# Patient Record
Sex: FEMALE | Race: WHITE | NOT HISPANIC OR LATINO | Employment: FULL TIME | URBAN - METROPOLITAN AREA
[De-identification: names, ages, dates, MRNs, and addresses within clinical notes are randomized per-mention and may not be internally consistent; named-entity substitution may affect disease eponyms.]

---

## 2020-10-20 ENCOUNTER — TELEPHONE (OUTPATIENT)
Dept: ENDOCRINOLOGY | Facility: CLINIC | Age: 65
End: 2020-10-20

## 2020-10-20 NOTE — TELEPHONE ENCOUNTER
PATIENT HAS RECENTLY SEEN A GASTRO PROVIDER RECENTLY AND WAS WAS GIVEN A DIET PLAN TO FOLLOW. SHE WOULD LIKE TO SPEAK WITH CLINICAL STAFF ABOUT THIS DIET PLAN AND HOW IT RELATED TO HER DIABETES. CALL BACK [phone]

## 2020-10-20 NOTE — TELEPHONE ENCOUNTER
Radha from [facility] [place] is requesting most recent Hemoglobin A1C Lab results for PT    Fax: [phone]

## 2021-01-29 RX ORDER — FENOFIBRATE 134 MG/1
134 CAPSULE ORAL DAILY
Qty: 90 CAPSULE | Refills: 3 | Status: SHIPPED | OUTPATIENT
Start: 2021-01-29 | End: 2022-02-25 | Stop reason: SDUPTHER

## 2021-01-29 RX ORDER — FENOFIBRATE 134 MG/1
1 CAPSULE ORAL DAILY
COMMUNITY
Start: 2021-01-29 | End: 2021-01-29 | Stop reason: SDUPTHER

## 2021-02-01 RX ORDER — LOSARTAN POTASSIUM 100 MG/1
TABLET ORAL
Qty: 90 TABLET | Refills: 1 | Status: SHIPPED | OUTPATIENT
Start: 2021-02-01 | End: 2021-02-12

## 2021-02-12 ENCOUNTER — OFFICE VISIT (OUTPATIENT)
Dept: ENDOCRINOLOGY | Facility: CLINIC | Age: 66
End: 2021-02-12

## 2021-02-12 ENCOUNTER — LAB (OUTPATIENT)
Dept: LAB | Facility: HOSPITAL | Age: 66
End: 2021-02-12

## 2021-02-12 VITALS
BODY MASS INDEX: 34.96 KG/M2 | HEART RATE: 80 BPM | HEIGHT: 62 IN | SYSTOLIC BLOOD PRESSURE: 130 MMHG | WEIGHT: 190 LBS | OXYGEN SATURATION: 98 % | TEMPERATURE: 97.1 F | DIASTOLIC BLOOD PRESSURE: 80 MMHG

## 2021-02-12 DIAGNOSIS — E83.52 HYPERCALCEMIA: ICD-10-CM

## 2021-02-12 DIAGNOSIS — E05.90 SUBCLINICAL HYPERTHYROIDISM: ICD-10-CM

## 2021-02-12 DIAGNOSIS — IMO0002 DIABETES MELLITUS TYPE 2, UNCONTROLLED, WITH COMPLICATIONS: Primary | ICD-10-CM

## 2021-02-12 LAB
ALBUMIN SERPL-MCNC: 4.8 G/DL (ref 3.5–5.2)
ALBUMIN/GLOB SERPL: 2.1 G/DL
ALP SERPL-CCNC: 50 U/L (ref 39–117)
ALT SERPL W P-5'-P-CCNC: 18 U/L (ref 1–33)
ANION GAP SERPL CALCULATED.3IONS-SCNC: 14.5 MMOL/L (ref 5–15)
AST SERPL-CCNC: 13 U/L (ref 1–32)
BILIRUB SERPL-MCNC: 0.2 MG/DL (ref 0–1.2)
BUN SERPL-MCNC: 24 MG/DL (ref 8–23)
BUN/CREAT SERPL: 29.6 (ref 7–25)
CALCIUM SPEC-SCNC: 11.1 MG/DL (ref 8.6–10.5)
CHLORIDE SERPL-SCNC: 101 MMOL/L (ref 98–107)
CO2 SERPL-SCNC: 23.5 MMOL/L (ref 22–29)
CREAT SERPL-MCNC: 0.81 MG/DL (ref 0.57–1)
EXPIRATION DATE: NORMAL
GFR SERPL CREATININE-BSD FRML MDRD: 71 ML/MIN/1.73
GLOBULIN UR ELPH-MCNC: 2.3 GM/DL
GLUCOSE SERPL-MCNC: 132 MG/DL (ref 65–99)
HBA1C MFR BLD: 7.3 %
Lab: NORMAL
POTASSIUM SERPL-SCNC: 4.3 MMOL/L (ref 3.5–5.2)
PROT SERPL-MCNC: 7.1 G/DL (ref 6–8.5)
PTH-INTACT SERPL-MCNC: 67.7 PG/ML (ref 15–65)
SODIUM SERPL-SCNC: 139 MMOL/L (ref 136–145)
T4 FREE SERPL-MCNC: 1.48 NG/DL (ref 0.93–1.7)
TSH SERPL DL<=0.05 MIU/L-ACNC: 0.53 UIU/ML (ref 0.27–4.2)

## 2021-02-12 PROCEDURE — 99214 OFFICE O/P EST MOD 30 MIN: CPT | Performed by: INTERNAL MEDICINE

## 2021-02-12 PROCEDURE — 83036 HEMOGLOBIN GLYCOSYLATED A1C: CPT | Performed by: INTERNAL MEDICINE

## 2021-02-12 PROCEDURE — 83970 ASSAY OF PARATHORMONE: CPT

## 2021-02-12 PROCEDURE — 84439 ASSAY OF FREE THYROXINE: CPT

## 2021-02-12 PROCEDURE — 84443 ASSAY THYROID STIM HORMONE: CPT

## 2021-02-12 PROCEDURE — 80053 COMPREHEN METABOLIC PANEL: CPT

## 2021-02-12 RX ORDER — ESTRADIOL 0.1 MG/G
1 CREAM VAGINAL
COMMUNITY
Start: 2021-02-01

## 2021-02-12 RX ORDER — DULAGLUTIDE 0.75 MG/.5ML
INJECTION, SOLUTION SUBCUTANEOUS
COMMUNITY
Start: 2021-01-10 | End: 2021-02-12 | Stop reason: SDUPTHER

## 2021-02-12 RX ORDER — AMLODIPINE BESYLATE 5 MG/1
5 TABLET ORAL DAILY
COMMUNITY
Start: 2020-12-29 | End: 2021-09-28 | Stop reason: SDUPTHER

## 2021-02-12 RX ORDER — FUROSEMIDE 20 MG/1
20 TABLET ORAL DAILY
COMMUNITY
Start: 2020-11-24 | End: 2021-08-20

## 2021-02-12 RX ORDER — LANCETS
EACH MISCELLANEOUS
COMMUNITY
End: 2021-07-14

## 2021-02-12 RX ORDER — FLUTICASONE PROPIONATE 50 MCG
2 SPRAY, SUSPENSION (ML) NASAL DAILY PRN
COMMUNITY

## 2021-02-12 RX ORDER — DULAGLUTIDE 0.75 MG/.5ML
0.5 INJECTION, SOLUTION SUBCUTANEOUS WEEKLY
Qty: 2 ML | Refills: 5 | Status: SHIPPED | OUTPATIENT
Start: 2021-02-12 | End: 2021-05-14

## 2021-02-12 RX ORDER — SIMVASTATIN 40 MG
40 TABLET ORAL DAILY
COMMUNITY
Start: 2020-12-29 | End: 2021-03-29 | Stop reason: SDUPTHER

## 2021-02-12 RX ORDER — FEXOFENADINE HCL 180 MG/1
TABLET ORAL
COMMUNITY
End: 2021-05-14

## 2021-02-12 RX ORDER — BISOPROLOL FUMARATE 10 MG/1
10 TABLET, FILM COATED ORAL DAILY
COMMUNITY
Start: 2021-01-29 | End: 2021-12-06 | Stop reason: SDUPTHER

## 2021-02-12 RX ORDER — ASPIRIN 81 MG/1
1 TABLET, CHEWABLE ORAL
COMMUNITY
End: 2021-08-20

## 2021-02-12 RX ORDER — LOSARTAN POTASSIUM 100 MG/1
100 TABLET ORAL DAILY
COMMUNITY
End: 2021-11-30 | Stop reason: SDUPTHER

## 2021-02-12 RX ORDER — CLOBETASOL PROPIONATE 0.5 MG/G
1 OINTMENT TOPICAL 3 TIMES DAILY PRN
COMMUNITY
Start: 2021-02-03

## 2021-02-12 NOTE — PROGRESS NOTES
"     Office Note      Date: 2021  Patient Name: Izabel Abreu  MRN: [id number]  : 1955    Chief Complaint   Patient presents with   • Diabetes       History of Present Illness:   Izabel Abreu is a 65 y.o. female who presents for Diabetes - type 2  Last A1c:7.5  She has had all sorts of digestive issues.  Lots of cramps and gas. Diarrhea and constipation.  Made lots of dietary changes but little change.    bg checks- occasionally  No hypos.    She has hx of high calcium  And subclinical hyperthyroidism   Hemoglobin A1C   Date Value Ref Range Status   2021 7.3 % Final       Changes in health since last visit: see above . Last eye exam 2020.    Subjective     Review of Systems:   Review of Systems   Constitutional: Negative.    HENT: Negative.    Eyes: Negative.    Respiratory: Negative.    Gastrointestinal: Positive for abdominal distention, constipation and diarrhea.   All other systems reviewed and are negative.      The following portions of the patient's history were reviewed and updated as appropriate: allergies, current medications, past family history, past medical history, past social history, past surgical history and problem list.    Objective     Visit Vitals  /80   Pulse 80   Temp 97.1 °F (36.2 °C) (Infrared)   Ht 157.5 cm (62\")   Wt 86.2 kg (190 lb)   SpO2 98%   BMI 34.75 kg/m²       Labs:    CMP  No results found for: GLUCOSE, BUN, CREATININE, EGFRIFNONA, EGFRIFAFRI, BCR, K, CO2, CALCIUM, PROTENTOTREF, LABIL2, BILIRUBIN, AST, ALT     CBC w/DIFF  No results found for: WBC, RBC, HGB, HCT, MCV, MCH, MCHC, RDW, RDWSD, MPV, PLT, NEUTRORELPCT, LYMPHORELPCT, MONORELPCT, EOSRELPCT, BASORELPCT, AUTOIGPER, NEUTROABS, LYMPHSABS, MONOSABS, EOSABS, BASOSABS, AUTOIGNUM, NRBC    Physical Exam:  Physical Exam  Vitals signs reviewed.   Constitutional:       Appearance: Normal appearance.   Cardiovascular:      Pulses:           Dorsalis pedis pulses are 2+ on the right side and 2+ on the " left side.        Posterior tibial pulses are 2+ on the right side and 2+ on the left side.   Musculoskeletal:      Right foot: Normal range of motion. No deformity, bunion, Charcot foot, foot drop or prominent metatarsal heads.      Left foot: Normal range of motion. No deformity, bunion, Charcot foot, foot drop or prominent metatarsal heads.   Feet:      Right foot:      Protective Sensation: 10 sites tested. 10 sites sensed.      Skin integrity: Skin integrity normal.      Toenail Condition: Right toenails are normal.      Left foot:      Protective Sensation: 10 sites tested. 10 sites sensed.      Skin integrity: Skin integrity normal.      Toenail Condition: Left toenails are normal.      Comments: Diabetic Foot Exam Performed and Monofilament Test Performed  Neurological:      Mental Status: She is alert.   Psychiatric:         Mood and Affect: Mood normal.         Thought Content: Thought content normal.         Judgment: Judgment normal.          Assessment / Plan      Assessment & Plan:  Problem List Items Addressed This Visit        Other    Hypercalcemia    Diabetes mellitus type 2, uncontrolled, with complications (CMS/[facility]) - Primary    Relevant Medications    Trulicity 0.75 MG/0.5ML solution pen-injector    metFORMIN (GLUCOPHAGE) 1000 MG tablet    Other Relevant Orders    POC Glycosylated Hemoglobin (Hb A1C) (Completed)    Subclinical hyperthyroidism    Relevant Medications    bisoprolol (ZEBeta) 10 MG tablet           Dez Sánchez MD   02/12/2021

## 2021-03-23 ENCOUNTER — TELEPHONE (OUTPATIENT)
Dept: CARDIOLOGY | Facility: CLINIC | Age: 66
End: 2021-03-23

## 2021-03-29 RX ORDER — SIMVASTATIN 40 MG
40 TABLET ORAL DAILY
Qty: 90 TABLET | Refills: 1 | Status: SHIPPED | OUTPATIENT
Start: 2021-03-29 | End: 2021-09-20

## 2021-05-14 ENCOUNTER — OFFICE VISIT (OUTPATIENT)
Dept: ENDOCRINOLOGY | Facility: CLINIC | Age: 66
End: 2021-05-14

## 2021-05-14 ENCOUNTER — LAB (OUTPATIENT)
Dept: LAB | Facility: HOSPITAL | Age: 66
End: 2021-05-14

## 2021-05-14 VITALS
BODY MASS INDEX: 34.41 KG/M2 | TEMPERATURE: 97.3 F | HEIGHT: 62 IN | HEART RATE: 73 BPM | SYSTOLIC BLOOD PRESSURE: 122 MMHG | WEIGHT: 187 LBS | OXYGEN SATURATION: 96 % | DIASTOLIC BLOOD PRESSURE: 64 MMHG

## 2021-05-14 DIAGNOSIS — E11.65 UNCONTROLLED TYPE 2 DIABETES MELLITUS WITH HYPERGLYCEMIA (HCC): Primary | ICD-10-CM

## 2021-05-14 DIAGNOSIS — E83.52 HYPERCALCEMIA: ICD-10-CM

## 2021-05-14 PROBLEM — E05.90 SUBCLINICAL HYPERTHYROIDISM: Status: RESOLVED | Noted: 2021-02-12 | Resolved: 2021-05-14

## 2021-05-14 PROBLEM — IMO0002 DIABETES MELLITUS TYPE 2, UNCONTROLLED, WITH COMPLICATIONS: Status: RESOLVED | Noted: 2021-02-12 | Resolved: 2021-05-14

## 2021-05-14 LAB
ALBUMIN SERPL-MCNC: 4.4 G/DL (ref 3.5–5.2)
ALBUMIN/GLOB SERPL: 1.6 G/DL
ALP SERPL-CCNC: 55 U/L (ref 39–117)
ALT SERPL W P-5'-P-CCNC: 24 U/L (ref 1–33)
ANION GAP SERPL CALCULATED.3IONS-SCNC: 12 MMOL/L (ref 5–15)
AST SERPL-CCNC: 25 U/L (ref 1–32)
BILIRUB SERPL-MCNC: 0.2 MG/DL (ref 0–1.2)
BUN SERPL-MCNC: 27 MG/DL (ref 8–23)
BUN/CREAT SERPL: 29.7 (ref 7–25)
CALCIUM SPEC-SCNC: 10.8 MG/DL (ref 8.6–10.5)
CHLORIDE SERPL-SCNC: 102 MMOL/L (ref 98–107)
CO2 SERPL-SCNC: 25 MMOL/L (ref 22–29)
CREAT SERPL-MCNC: 0.91 MG/DL (ref 0.57–1)
EXPIRATION DATE: NORMAL
GFR SERPL CREATININE-BSD FRML MDRD: 62 ML/MIN/1.73
GLOBULIN UR ELPH-MCNC: 2.7 GM/DL
GLUCOSE SERPL-MCNC: 134 MG/DL (ref 65–99)
HBA1C MFR BLD: 7.6 %
Lab: NORMAL
POTASSIUM SERPL-SCNC: 4.5 MMOL/L (ref 3.5–5.2)
PROT SERPL-MCNC: 7.1 G/DL (ref 6–8.5)
SODIUM SERPL-SCNC: 139 MMOL/L (ref 136–145)

## 2021-05-14 PROCEDURE — 99213 OFFICE O/P EST LOW 20 MIN: CPT | Performed by: INTERNAL MEDICINE

## 2021-05-14 PROCEDURE — 80053 COMPREHEN METABOLIC PANEL: CPT

## 2021-05-14 PROCEDURE — 83036 HEMOGLOBIN GLYCOSYLATED A1C: CPT | Performed by: INTERNAL MEDICINE

## 2021-05-14 RX ORDER — DULAGLUTIDE 1.5 MG/.5ML
0.5 INJECTION, SOLUTION SUBCUTANEOUS WEEKLY
Qty: 6 ML | Refills: 3 | Status: SHIPPED | OUTPATIENT
Start: 2021-05-14 | End: 2021-08-20

## 2021-05-14 RX ORDER — DEXTROMETHORPHAN HYDROBROMIDE AND PROMETHAZINE HYDROCHLORIDE 15; 6.25 MG/5ML; MG/5ML
SYRUP ORAL AS NEEDED
COMMUNITY
Start: 2021-05-10 | End: 2022-02-25

## 2021-05-14 RX ORDER — ALBUTEROL SULFATE 90 UG/1
AEROSOL, METERED RESPIRATORY (INHALATION) SEE ADMIN INSTRUCTIONS
COMMUNITY
Start: 2021-05-10 | End: 2021-08-20

## 2021-05-14 RX ORDER — LORATADINE 10 MG/1
1 TABLET ORAL DAILY PRN
COMMUNITY
Start: 2020-05-10 | End: 2022-02-25

## 2021-05-14 NOTE — PROGRESS NOTES
"     Office Note      Date: 2021  Patient Name: Izabel Abreu  MRN: [id number]  : 1955    Chief Complaint   Patient presents with   • Follow-up   • Diabetes     type2       History of Present Illness:   Izabel Abreu is a 65 y.o. female who presents for Diabetes - type 2.  She is on trulicity  But we had to reduce the metformin due to GI side effects.  The side effects improved.  She notes higher blood sugars.    Last time her calcium was 11.1 and pth was 67.      Last A1c:  Hemoglobin A1C   Date Value Ref Range Status   2021 7.6 % Final       Changes in health since last visit: none . Last eye exam up to date  .    Subjective          Review of Systems:   Review of Systems   Constitutional: Negative.    HENT: Negative.    All other systems reviewed and are negative.      The following portions of the patient's history were reviewed and updated as appropriate: allergies, current medications, past family history, past medical history, past social history, past surgical history and problem list.    Objective     Visit Vitals  /64   Pulse 73   Temp 97.3 °F (36.3 °C) (Infrared)   Ht 157.5 cm (62\")   Wt 84.8 kg (187 lb)   SpO2 96%   BMI 34.20 kg/m²       Labs:    CMP  Lab Results   Component Value Date    GLUCOSE 132 (H) 2021    BUN 24 (H) 2021    CREATININE 0.81 2021    EGFRIFNONA 71 2021    BCR 29.6 (H) 2021    K 4.3 2021    CO2 23.5 2021    CALCIUM 11.1 (H) 2021    AST 13 2021    ALT 18 2021        CBC w/DIFF  No results found for: WBC, RBC, HGB, HCT, MCV, MCH, MCHC, RDW, RDWSD, MPV, PLT, NEUTRORELPCT, LYMPHORELPCT, MONORELPCT, EOSRELPCT, BASORELPCT, AUTOIGPER, NEUTROABS, LYMPHSABS, MONOSABS, EOSABS, BASOSABS, AUTOIGNUM, NRBC    Physical Exam:  Physical Exam  Vitals reviewed.   Constitutional:       Appearance: Normal appearance.   Neurological:      Mental Status: She is alert.   Psychiatric:         Mood and Affect: Mood normal.   "       Thought Content: Thought content normal.         Judgment: Judgment normal.          Assessment / Plan      Assessment & Plan:  Problem List Items Addressed This Visit        Other    Hypercalcemia    Current Assessment & Plan     Due to primary hyperparathyroidism.  If calcium today is still>11 will send for surgery consult          Relevant Orders    Comprehensive Metabolic Panel    Uncontrolled type 2 diabetes mellitus with hyperglycemia (CMS/HCC) - Primary    Current Assessment & Plan     Deteriorated. Will increased trulicity         Relevant Medications    metFORMIN (GLUCOPHAGE) 1000 MG tablet    Dulaglutide (Trulicity) 1.5 MG/0.5ML solution pen-injector    Other Relevant Orders    POC Glycosylated Hemoglobin (Hb A1C) (Completed)           Dez Sánchez MD   05/14/2021

## 2021-05-19 DIAGNOSIS — E83.52 HYPERCALCEMIA: Primary | ICD-10-CM

## 2021-05-28 ENCOUNTER — TRANSCRIBE ORDERS (OUTPATIENT)
Dept: ADMINISTRATIVE | Facility: HOSPITAL | Age: 66
End: 2021-05-28

## 2021-05-28 DIAGNOSIS — E21.3 HYPERPARATHYROIDISM (HCC): Primary | ICD-10-CM

## 2021-06-23 ENCOUNTER — HOSPITAL ENCOUNTER (OUTPATIENT)
Dept: NUCLEAR MEDICINE | Facility: HOSPITAL | Age: 66
Discharge: HOME | End: 2021-06-23

## 2021-06-23 DIAGNOSIS — E21.3 HYPERPARATHYROIDISM (HCC): ICD-10-CM

## 2021-06-23 PROCEDURE — 0 TECHNETIUM SESTAMIBI: Performed by: SURGERY

## 2021-06-23 PROCEDURE — A9500 TC99M SESTAMIBI: HCPCS | Performed by: SURGERY

## 2021-06-23 PROCEDURE — 78070 PARATHYROID PLANAR IMAGING: CPT

## 2021-06-23 RX ADMIN — TECHNETIUM TC 99M SESTAMIBI 1 DOSE: 1 INJECTION INTRAVENOUS at 11:01

## 2021-07-14 ENCOUNTER — PRE-ADMISSION TESTING (OUTPATIENT)
Dept: PREADMISSION TESTING | Facility: HOSPITAL | Age: 66
End: 2021-07-14

## 2021-07-14 VITALS — BODY MASS INDEX: 34 KG/M2 | HEIGHT: 62 IN | WEIGHT: 184.75 LBS

## 2021-07-14 LAB
ANION GAP SERPL CALCULATED.3IONS-SCNC: 15 MMOL/L (ref 5–15)
BUN SERPL-MCNC: 31 MG/DL (ref 8–23)
BUN/CREAT SERPL: 31 (ref 7–25)
CALCIUM SPEC-SCNC: 11.2 MG/DL (ref 8.6–10.5)
CHLORIDE SERPL-SCNC: 101 MMOL/L (ref 98–107)
CO2 SERPL-SCNC: 23 MMOL/L (ref 22–29)
CREAT SERPL-MCNC: 1 MG/DL (ref 0.57–1)
DEPRECATED RDW RBC AUTO: 46.7 FL (ref 37–54)
ERYTHROCYTE [DISTWIDTH] IN BLOOD BY AUTOMATED COUNT: 15.1 % (ref 12.3–15.4)
GFR SERPL CREATININE-BSD FRML MDRD: 56 ML/MIN/1.73
GLUCOSE SERPL-MCNC: 149 MG/DL (ref 65–99)
HCT VFR BLD AUTO: 46.2 % (ref 34–46.6)
HGB BLD-MCNC: 14.7 G/DL (ref 12–15.9)
MCH RBC QN AUTO: 27.3 PG (ref 26.6–33)
MCHC RBC AUTO-ENTMCNC: 31.8 G/DL (ref 31.5–35.7)
MCV RBC AUTO: 85.7 FL (ref 79–97)
PLATELET # BLD AUTO: 432 10*3/MM3 (ref 140–450)
PMV BLD AUTO: 11.3 FL (ref 6–12)
POTASSIUM SERPL-SCNC: 4.9 MMOL/L (ref 3.5–5.2)
QT INTERVAL: 394 MS
QTC INTERVAL: 431 MS
RBC # BLD AUTO: 5.39 10*6/MM3 (ref 3.77–5.28)
SARS-COV-2 RNA PNL SPEC NAA+PROBE: NOT DETECTED
SODIUM SERPL-SCNC: 139 MMOL/L (ref 136–145)
WBC # BLD AUTO: 10.22 10*3/MM3 (ref 3.4–10.8)

## 2021-07-14 PROCEDURE — C9803 HOPD COVID-19 SPEC COLLECT: HCPCS

## 2021-07-14 PROCEDURE — 85027 COMPLETE CBC AUTOMATED: CPT

## 2021-07-14 PROCEDURE — 93005 ELECTROCARDIOGRAM TRACING: CPT

## 2021-07-14 PROCEDURE — 80048 BASIC METABOLIC PNL TOTAL CA: CPT

## 2021-07-14 PROCEDURE — 36415 COLL VENOUS BLD VENIPUNCTURE: CPT

## 2021-07-14 PROCEDURE — U0004 COV-19 TEST NON-CDC HGH THRU: HCPCS

## 2021-07-14 PROCEDURE — 93010 ELECTROCARDIOGRAM REPORT: CPT | Performed by: INTERNAL MEDICINE

## 2021-07-14 RX ORDER — SIMETHICONE 125 MG
125 TABLET,CHEWABLE ORAL EVERY 6 HOURS PRN
COMMUNITY
End: 2022-10-21

## 2021-07-14 RX ORDER — SENNOSIDES 8.6 MG
1300 CAPSULE ORAL EVERY 8 HOURS PRN
COMMUNITY

## 2021-07-14 RX ORDER — ALPHA-D-GALACTOSIDASE
2 TABLET ORAL 3 TIMES DAILY PRN
COMMUNITY

## 2021-07-14 RX ORDER — DIPHENHYDRAMINE HCL 25 MG
25 CAPSULE ORAL NIGHTLY PRN
COMMUNITY
End: 2022-02-25

## 2021-07-14 RX ORDER — ELECTROLYTES/DEXTROSE
1 SOLUTION, ORAL ORAL DAILY
COMMUNITY
End: 2022-02-25

## 2021-07-14 RX ORDER — LANOLIN ALCOHOL/MO/W.PET/CERES
1000 CREAM (GRAM) TOPICAL DAILY
COMMUNITY

## 2021-07-14 RX ORDER — OLOPATADINE HYDROCHLORIDE 2 MG/ML
1 SOLUTION/ DROPS OPHTHALMIC DAILY
COMMUNITY

## 2021-07-14 NOTE — PAT
An arrival time for procedure was not given during PAT visit. If patient had any questions or concerns about their arrival time, they were instructed to contact their surgeon/physician.  Additionally, if the patient referred to an arrival time that was acquired from their my chart account, patient was encouraged to verify that time with their surgeon/physician.  NO arrival times given in Pre Admission Testing Department.    Patient to apply Chlorhexadine wipes  to surgical area (as instructed) the night before procedure and the AM of procedure. Wipes provided.    Per Anesthesia Request, patient instructed not to take their ACE/ARB medications on the AM of surgery.

## 2021-07-15 ENCOUNTER — ANESTHESIA EVENT (OUTPATIENT)
Dept: PERIOP | Facility: HOSPITAL | Age: 66
End: 2021-07-15

## 2021-07-15 RX ORDER — FAMOTIDINE 10 MG/ML
20 INJECTION, SOLUTION INTRAVENOUS ONCE
Status: CANCELLED | OUTPATIENT
Start: 2021-07-15 | End: 2021-07-15

## 2021-07-16 ENCOUNTER — ANESTHESIA (OUTPATIENT)
Dept: PERIOP | Facility: HOSPITAL | Age: 66
End: 2021-07-16

## 2021-07-16 ENCOUNTER — HOSPITAL ENCOUNTER (OUTPATIENT)
Facility: HOSPITAL | Age: 66
Setting detail: HOSPITAL OUTPATIENT SURGERY
Discharge: HOME | End: 2021-07-16
Attending: SURGERY | Admitting: SURGERY

## 2021-07-16 VITALS
HEART RATE: 73 BPM | DIASTOLIC BLOOD PRESSURE: 71 MMHG | WEIGHT: 184.75 LBS | SYSTOLIC BLOOD PRESSURE: 145 MMHG | OXYGEN SATURATION: 96 % | HEIGHT: 62 IN | TEMPERATURE: 97.6 F | RESPIRATION RATE: 18 BRPM | BODY MASS INDEX: 34 KG/M2

## 2021-07-16 DIAGNOSIS — E20.9 IDIOPATHIC PARATHYROIDISM (HCC): ICD-10-CM

## 2021-07-16 LAB
GLUCOSE BLDC GLUCOMTR-MCNC: 166 MG/DL (ref 70–130)
GLUCOSE BLDC GLUCOMTR-MCNC: 195 MG/DL (ref 70–130)
PTH-INTACT SERPL-MCNC: 77.8 PG/ML (ref 15–65)

## 2021-07-16 PROCEDURE — 25010000002 DEXAMETHASONE PER 1 MG: Performed by: NURSE ANESTHETIST, CERTIFIED REGISTERED

## 2021-07-16 PROCEDURE — 88331 PATH CONSLTJ SURG 1 BLK 1SPC: CPT | Performed by: PATHOLOGY

## 2021-07-16 PROCEDURE — 25010000002 ONDANSETRON PER 1 MG: Performed by: NURSE ANESTHETIST, CERTIFIED REGISTERED

## 2021-07-16 PROCEDURE — 63710000001 PROMETHAZINE PER 25 MG: Performed by: NURSE ANESTHETIST, CERTIFIED REGISTERED

## 2021-07-16 PROCEDURE — 83970 ASSAY OF PARATHORMONE: CPT | Performed by: SURGERY

## 2021-07-16 PROCEDURE — C1889 IMPLANT/INSERT DEVICE, NOC: HCPCS | Performed by: SURGERY

## 2021-07-16 PROCEDURE — 63710000001 INSULIN LISPRO (HUMAN) PER 5 UNITS: Performed by: NURSE ANESTHETIST, CERTIFIED REGISTERED

## 2021-07-16 PROCEDURE — 88305 TISSUE EXAM BY PATHOLOGIST: CPT | Performed by: SURGERY

## 2021-07-16 PROCEDURE — 25010000002 PROPOFOL 10 MG/ML EMULSION: Performed by: NURSE ANESTHETIST, CERTIFIED REGISTERED

## 2021-07-16 PROCEDURE — 25010000003 CEFAZOLIN IN DEXTROSE 2-4 GM/100ML-% SOLUTION: Performed by: SURGERY

## 2021-07-16 PROCEDURE — 94799 UNLISTED PULMONARY SVC/PX: CPT

## 2021-07-16 PROCEDURE — 82962 GLUCOSE BLOOD TEST: CPT

## 2021-07-16 PROCEDURE — 60500 EXPLORE PARATHYROID GLANDS: CPT | Performed by: PHYSICIAN ASSISTANT

## 2021-07-16 PROCEDURE — 25010000002 FENTANYL CITRATE (PF) 50 MCG/ML SOLUTION: Performed by: NURSE ANESTHETIST, CERTIFIED REGISTERED

## 2021-07-16 DEVICE — CLIP LIGAT VASC HORIZON TI SM YEL 6CT: Type: IMPLANTABLE DEVICE | Site: PARATHYROID | Status: FUNCTIONAL

## 2021-07-16 RX ORDER — PROMETHAZINE HYDROCHLORIDE 25 MG/1
25 TABLET ORAL ONCE AS NEEDED
Status: COMPLETED | OUTPATIENT
Start: 2021-07-16 | End: 2021-07-16

## 2021-07-16 RX ORDER — EPHEDRINE SULFATE 50 MG/ML
INJECTION, SOLUTION INTRAVENOUS AS NEEDED
Status: DISCONTINUED | OUTPATIENT
Start: 2021-07-16 | End: 2021-07-16 | Stop reason: SURG

## 2021-07-16 RX ORDER — OXYCODONE HYDROCHLORIDE AND ACETAMINOPHEN 5; 325 MG/1; MG/1
1 TABLET ORAL ONCE AS NEEDED
Status: DISCONTINUED | OUTPATIENT
Start: 2021-07-16 | End: 2021-07-16 | Stop reason: HOSPADM

## 2021-07-16 RX ORDER — MIDAZOLAM HYDROCHLORIDE 1 MG/ML
1 INJECTION INTRAMUSCULAR; INTRAVENOUS
Status: DISCONTINUED | OUTPATIENT
Start: 2021-07-16 | End: 2021-07-16 | Stop reason: HOSPADM

## 2021-07-16 RX ORDER — FENTANYL CITRATE 50 UG/ML
50 INJECTION, SOLUTION INTRAMUSCULAR; INTRAVENOUS
Status: DISCONTINUED | OUTPATIENT
Start: 2021-07-16 | End: 2021-07-16 | Stop reason: HOSPADM

## 2021-07-16 RX ORDER — SODIUM CHLORIDE, SODIUM LACTATE, POTASSIUM CHLORIDE, CALCIUM CHLORIDE 600; 310; 30; 20 MG/100ML; MG/100ML; MG/100ML; MG/100ML
9 INJECTION, SOLUTION INTRAVENOUS CONTINUOUS
Status: DISCONTINUED | OUTPATIENT
Start: 2021-07-16 | End: 2021-07-16 | Stop reason: HOSPADM

## 2021-07-16 RX ORDER — IPRATROPIUM BROMIDE AND ALBUTEROL SULFATE 2.5; .5 MG/3ML; MG/3ML
3 SOLUTION RESPIRATORY (INHALATION) ONCE AS NEEDED
Status: DISCONTINUED | OUTPATIENT
Start: 2021-07-16 | End: 2021-07-16 | Stop reason: HOSPADM

## 2021-07-16 RX ORDER — PROMETHAZINE HYDROCHLORIDE 25 MG/1
25 SUPPOSITORY RECTAL ONCE AS NEEDED
Status: COMPLETED | OUTPATIENT
Start: 2021-07-16 | End: 2021-07-16

## 2021-07-16 RX ORDER — HYDROMORPHONE HYDROCHLORIDE 1 MG/ML
0.5 INJECTION, SOLUTION INTRAMUSCULAR; INTRAVENOUS; SUBCUTANEOUS
Status: DISCONTINUED | OUTPATIENT
Start: 2021-07-16 | End: 2021-07-16 | Stop reason: HOSPADM

## 2021-07-16 RX ORDER — PROMETHAZINE HYDROCHLORIDE 25 MG/1
25 TABLET ORAL ONCE AS NEEDED
Status: DISCONTINUED | OUTPATIENT
Start: 2021-07-16 | End: 2021-07-16 | Stop reason: SDUPTHER

## 2021-07-16 RX ORDER — TRAMADOL HYDROCHLORIDE 50 MG/1
50 TABLET ORAL EVERY 6 HOURS PRN
Qty: 20 TABLET | Refills: 0 | Status: SHIPPED | OUTPATIENT
Start: 2021-07-16 | End: 2021-08-20

## 2021-07-16 RX ORDER — DEXAMETHASONE SODIUM PHOSPHATE 4 MG/ML
INJECTION, SOLUTION INTRA-ARTICULAR; INTRALESIONAL; INTRAMUSCULAR; INTRAVENOUS; SOFT TISSUE AS NEEDED
Status: DISCONTINUED | OUTPATIENT
Start: 2021-07-16 | End: 2021-07-16 | Stop reason: SURG

## 2021-07-16 RX ORDER — CEFAZOLIN SODIUM 2 G/100ML
2 INJECTION, SOLUTION INTRAVENOUS ONCE
Status: COMPLETED | OUTPATIENT
Start: 2021-07-16 | End: 2021-07-16

## 2021-07-16 RX ORDER — HYDRALAZINE HYDROCHLORIDE 20 MG/ML
5 INJECTION INTRAMUSCULAR; INTRAVENOUS
Status: DISCONTINUED | OUTPATIENT
Start: 2021-07-16 | End: 2021-07-16 | Stop reason: HOSPADM

## 2021-07-16 RX ORDER — DROPERIDOL 2.5 MG/ML
0.62 INJECTION, SOLUTION INTRAMUSCULAR; INTRAVENOUS AS NEEDED
Status: DISCONTINUED | OUTPATIENT
Start: 2021-07-16 | End: 2021-07-16 | Stop reason: HOSPADM

## 2021-07-16 RX ORDER — MAGNESIUM HYDROXIDE 1200 MG/15ML
LIQUID ORAL AS NEEDED
Status: DISCONTINUED | OUTPATIENT
Start: 2021-07-16 | End: 2021-07-16 | Stop reason: HOSPADM

## 2021-07-16 RX ORDER — REMIFENTANIL HYDROCHLORIDE 1 MG/ML
INJECTION, POWDER, LYOPHILIZED, FOR SOLUTION INTRAVENOUS CONTINUOUS PRN
Status: DISCONTINUED | OUTPATIENT
Start: 2021-07-16 | End: 2021-07-16 | Stop reason: SURG

## 2021-07-16 RX ORDER — SODIUM CHLORIDE 0.9 % (FLUSH) 0.9 %
3 SYRINGE (ML) INJECTION EVERY 12 HOURS SCHEDULED
Status: DISCONTINUED | OUTPATIENT
Start: 2021-07-16 | End: 2021-07-16 | Stop reason: HOSPADM

## 2021-07-16 RX ORDER — LIDOCAINE HYDROCHLORIDE 10 MG/ML
0.5 INJECTION, SOLUTION EPIDURAL; INFILTRATION; INTRACAUDAL; PERINEURAL ONCE AS NEEDED
Status: DISCONTINUED | OUTPATIENT
Start: 2021-07-16 | End: 2021-07-16 | Stop reason: HOSPADM

## 2021-07-16 RX ORDER — BUPIVACAINE HCL/0.9 % NACL/PF 0.125 %
PLASTIC BAG, INJECTION (ML) EPIDURAL AS NEEDED
Status: DISCONTINUED | OUTPATIENT
Start: 2021-07-16 | End: 2021-07-16 | Stop reason: SURG

## 2021-07-16 RX ORDER — ONDANSETRON 2 MG/ML
INJECTION INTRAMUSCULAR; INTRAVENOUS AS NEEDED
Status: DISCONTINUED | OUTPATIENT
Start: 2021-07-16 | End: 2021-07-16 | Stop reason: SURG

## 2021-07-16 RX ORDER — ONDANSETRON 2 MG/ML
4 INJECTION INTRAMUSCULAR; INTRAVENOUS ONCE AS NEEDED
Status: COMPLETED | OUTPATIENT
Start: 2021-07-16 | End: 2021-07-16

## 2021-07-16 RX ORDER — DROPERIDOL 2.5 MG/ML
0.62 INJECTION, SOLUTION INTRAMUSCULAR; INTRAVENOUS ONCE AS NEEDED
Status: DISCONTINUED | OUTPATIENT
Start: 2021-07-16 | End: 2021-07-16 | Stop reason: HOSPADM

## 2021-07-16 RX ORDER — CALCIUM CARBONATE 200(500)MG
2 TABLET,CHEWABLE ORAL 2 TIMES DAILY
Qty: 90 TABLET | Refills: 3 | Status: SHIPPED | OUTPATIENT
Start: 2021-07-16 | End: 2021-08-20

## 2021-07-16 RX ORDER — FAMOTIDINE 20 MG/1
20 TABLET, FILM COATED ORAL ONCE
Status: DISCONTINUED | OUTPATIENT
Start: 2021-07-16 | End: 2021-07-16 | Stop reason: HOSPADM

## 2021-07-16 RX ORDER — MEPERIDINE HYDROCHLORIDE 25 MG/ML
12.5 INJECTION INTRAMUSCULAR; INTRAVENOUS; SUBCUTANEOUS
Status: DISCONTINUED | OUTPATIENT
Start: 2021-07-16 | End: 2021-07-16 | Stop reason: HOSPADM

## 2021-07-16 RX ORDER — PROPOFOL 10 MG/ML
VIAL (ML) INTRAVENOUS AS NEEDED
Status: DISCONTINUED | OUTPATIENT
Start: 2021-07-16 | End: 2021-07-16 | Stop reason: SURG

## 2021-07-16 RX ORDER — NALOXONE HCL 0.4 MG/ML
0.4 VIAL (ML) INJECTION AS NEEDED
Status: DISCONTINUED | OUTPATIENT
Start: 2021-07-16 | End: 2021-07-16 | Stop reason: HOSPADM

## 2021-07-16 RX ORDER — SODIUM CHLORIDE 0.9 % (FLUSH) 0.9 %
10 SYRINGE (ML) INJECTION AS NEEDED
Status: DISCONTINUED | OUTPATIENT
Start: 2021-07-16 | End: 2021-07-16 | Stop reason: HOSPADM

## 2021-07-16 RX ORDER — LABETALOL HYDROCHLORIDE 5 MG/ML
5 INJECTION, SOLUTION INTRAVENOUS
Status: DISCONTINUED | OUTPATIENT
Start: 2021-07-16 | End: 2021-07-16 | Stop reason: HOSPADM

## 2021-07-16 RX ORDER — SODIUM CHLORIDE 0.9 % (FLUSH) 0.9 %
3-10 SYRINGE (ML) INJECTION AS NEEDED
Status: DISCONTINUED | OUTPATIENT
Start: 2021-07-16 | End: 2021-07-16 | Stop reason: HOSPADM

## 2021-07-16 RX ORDER — SODIUM CHLORIDE 0.9 % (FLUSH) 0.9 %
10 SYRINGE (ML) INJECTION EVERY 12 HOURS SCHEDULED
Status: DISCONTINUED | OUTPATIENT
Start: 2021-07-16 | End: 2021-07-16 | Stop reason: HOSPADM

## 2021-07-16 RX ADMIN — ONDANSETRON 4 MG: 2 INJECTION INTRAMUSCULAR; INTRAVENOUS at 10:07

## 2021-07-16 RX ADMIN — DEXAMETHASONE SODIUM PHOSPHATE 8 MG: 4 INJECTION, SOLUTION INTRA-ARTICULAR; INTRALESIONAL; INTRAMUSCULAR; INTRAVENOUS; SOFT TISSUE at 10:07

## 2021-07-16 RX ADMIN — Medication 100 MCG: at 09:56

## 2021-07-16 RX ADMIN — REMIFENTANIL HYDROCHLORIDE 0.2 MCG/KG/MIN: 1 INJECTION, POWDER, LYOPHILIZED, FOR SOLUTION INTRAVENOUS at 09:29

## 2021-07-16 RX ADMIN — ONDANSETRON 4 MG: 2 INJECTION INTRAMUSCULAR; INTRAVENOUS at 11:43

## 2021-07-16 RX ADMIN — Medication 100 MCG: at 09:55

## 2021-07-16 RX ADMIN — FENTANYL CITRATE 50 MCG: 50 INJECTION INTRAMUSCULAR; INTRAVENOUS at 11:08

## 2021-07-16 RX ADMIN — CEFAZOLIN SODIUM 2 G: 10 INJECTION, POWDER, FOR SOLUTION INTRAVENOUS at 09:23

## 2021-07-16 RX ADMIN — PROMETHAZINE HYDROCHLORIDE 25 MG: 25 TABLET ORAL at 11:11

## 2021-07-16 RX ADMIN — PROPOFOL 150 MG: 10 INJECTION, EMULSION INTRAVENOUS at 09:29

## 2021-07-16 RX ADMIN — EPHEDRINE SULFATE 5 MG: 50 INJECTION INTRAVENOUS at 09:56

## 2021-07-16 RX ADMIN — INSULIN LISPRO 2 UNITS: 100 INJECTION, SOLUTION INTRAVENOUS; SUBCUTANEOUS at 12:05

## 2021-07-16 RX ADMIN — SODIUM CHLORIDE, POTASSIUM CHLORIDE, SODIUM LACTATE AND CALCIUM CHLORIDE: 600; 310; 30; 20 INJECTION, SOLUTION INTRAVENOUS at 09:23

## 2021-07-16 RX ADMIN — FENTANYL CITRATE 50 MCG: 50 INJECTION INTRAMUSCULAR; INTRAVENOUS at 11:20

## 2021-07-16 NOTE — ANESTHESIA PROCEDURE NOTES
Airway  Urgency: elective    Date/Time: 7/16/2021 9:31 AM  Airway not difficult    General Information and Staff    Patient location during procedure: OR  CRNA: Justo Castillo CRNA    Indications and Patient Condition  Indications for airway management: airway protection    Preoxygenated: yes  MILS not maintained throughout  Mask difficulty assessment: 1 - vent by mask    Final Airway Details  Final airway type: endotracheal airway      Successful airway: ETT  Cuffed: yes   Successful intubation technique: direct laryngoscopy  Endotracheal tube insertion site: oral  Blade: Briggs  Blade size: 3  ETT size (mm): 7.0  Cormack-Lehane Classification: grade I - full view of glottis  Placement verified by: chest auscultation and capnometry   Measured from: lips  ETT/EBT  to lips (cm): 20  Number of attempts at approach: 1  Assessment: lips, teeth, and gum same as pre-op and atraumatic intubation    Additional Comments  Negative epigastric sounds, Breath sound equal bilaterally with symmetric chest rise and fall

## 2021-07-16 NOTE — H&P
Pre-Op H&P  Izabel Abreu  [id number]  1955      Chief complaint: Fatigue      Subjective:  Patient is a 65 y.o.female presents for scheduled surgery by Dr. Suh. She anticipates a PARATHYROIDECTOMY today. She has hypercalcemia and elevated PTH for several years. Some fatigue and brain fog. Reports constipation.      Review of Systems:  Constitutional-- No fever, chills or sweats. + fatigue.  CV-- No chest pain, palpitation or syncope. +HTN, HLD  Resp-- No SOB, cough, hemoptysis. +CARO on CPAP  Skin--No rashes or lesions      Allergies:   Allergies   Allergen Reactions   • Diflucan [Fluconazole] Headache   • Azithromycin GI Intolerance   • Codeine Itching   • Hydrocodone-Acetaminophen Hives   • Latex Itching   • Penicillins Hives   • Quinapril Other (See Comments)     cough         Home Meds:  Medications Prior to Admission   Medication Sig Dispense Refill Last Dose   • acetaminophen (TYLENOL) 650 MG 8 hr tablet Take 1,300 mg by mouth Every 8 (Eight) Hours As Needed for Mild Pain .   Past Week at Unknown time   • Alpha-D-Galactosidase (Beano) tablet Take 2 doses by mouth 3 (Three) Times a Day As Needed (FLATULENCE).   Past Week at Unknown time   • amLODIPine (NORVASC) 5 MG tablet Take 5 mg by mouth Daily.   7/16/2021 at 0648   • bisoprolol (ZEBeta) 10 MG tablet Take 10 mg by mouth Daily.   7/16/2021 at 0648   • clobetasol (TEMOVATE) 0.05 % ointment Apply 1 application topically to the appropriate area as directed 3 (Three) Times a Day As Needed.   Past Week at Unknown time   • diphenhydrAMINE (BENADRYL) 25 mg capsule Take 25 mg by mouth At Night As Needed for Sleep.   7/15/2021 at 2030   • estradiol (ESTRACE) 0.1 MG/GM vaginal cream Insert 1 g into the vagina 3 (Three) Times a Week if Needed (VAGINAL ATROPHY).   Past Month at Unknown time   • fenofibrate micronized (LOFIBRA) 134 MG capsule Take 1 capsule by mouth Daily for 90 days. 90 capsule 3 7/15/2021 at 0730   • fluticasone (FLONASE) 50 MCG/ACT  nasal spray 2 sprays into the nostril(s) as directed by provider Daily As Needed for Rhinitis or Allergies.   Past Week at Unknown time   • furosemide (LASIX) 20 MG tablet Take 20 mg by mouth Daily.   7/15/2021 at 0730   • loratadine (Claritin) 10 MG tablet Take 1 tablet by mouth Daily As Needed.   Past Week at Unknown time   • losartan (COZAAR) 100 MG tablet Take 100 mg by mouth Daily.   7/15/2021 at 0730   • metFORMIN (GLUCOPHAGE) 1000 MG tablet TAKE 1 TABLET BY MOUTH TWICE DAILY. (Patient taking differently: Take 1,000 mg by mouth Daily.) 180 tablet 1 7/15/2021 at 0730   • olopatadine (PATADAY) 0.2 % solution ophthalmic solution Administer 1 drop to both eyes Daily.   Past Week at Unknown time   • polyethyl glycol-propyl glycol (SYSTANE) 0.4-0.3 % solution ophthalmic solution Administer 1 drop to both eyes Every 1 (One) Hour As Needed (DRY EYES).   Past Week at Unknown time   • Pyridoxine HCl (Vitamin B6) 100 MG tablet Take 1 tablet by mouth Daily.   7/15/2021 at 0730   • simethicone (MYLICON) 125 MG chewable tablet Chew 125 mg Every 6 (Six) Hours As Needed for Flatulence.   Past Week at Unknown time   • simvastatin (ZOCOR) 40 MG tablet Take 1 tablet by mouth Daily. 90 tablet 1 7/15/2021 at 2230   • vitamin B-12 (CYANOCOBALAMIN) 1000 MCG tablet Take 1,000 mcg by mouth Daily.   7/15/2021 at 0730   • albuterol sulfate  (90 Base) MCG/ACT inhaler Inhale See Admin Instructions. Inhale 2 puffs by mouth every 4 to 6 hours as needed      • aspirin 81 MG chewable tablet Chew 1 tablet.      • Dulaglutide (Trulicity) 1.5 MG/0.5ML solution pen-injector Inject 1.5 mg under the skin into the appropriate area as directed 1 (One) Time Per Week. 6 mL 3 7/11/2021   • metFORMIN (GLUCOPHAGE) 1000 MG tablet Take 1,000 mg by mouth Daily With Breakfast.      • promethazine-dextromethorphan (PROMETHAZINE-DM) 6.25-15 MG/5ML syrup As Needed.   More than a month at Unknown time         PMH:   Past Medical History:   Diagnosis Date  "  • Asthma    • Diabetes mellitus (CMS/HCC)    • Elevated cholesterol    • Flatulence    • Heart murmur    • Hypercalcemia    • Hyperglycemia    • Hyperparathyroidism (CMS/HCC)    • Hypertension    • Sleep apnea     CPAP 7/14CM   • Subclinical hyperthyroidism      PSH:    Past Surgical History:   Procedure Laterality Date   •  SECTION     • COLONOSCOPY      SCHEDULED FOR    • D & C AND LAPAROSCOPY     • SHOULDER SURGERY      shoulder joint surgery-Abstracted from [place]    • SINUS SURGERY     • TOE SURGERY     • TUBAL ABDOMINAL LIGATION Bilateral        Immunization History:  Influenza:   Pneumococcal: UTD  Tetanus: UTD  Covid x2:     Social History:   Tobacco:   Social History     Tobacco Use   Smoking Status Never Smoker   Smokeless Tobacco Never Used      Alcohol:     Social History     Substance and Sexual Activity   Alcohol Use Yes    Comment: rarely         Physical Exam:/83 (BP Location: Right arm, Patient Position: Lying)   Pulse 68   Temp 98.3 °F (36.8 °C) (Temporal)   Resp 16   Ht 157.5 cm (62\")   Wt 83.8 kg (184 lb 11.9 oz)   SpO2 96%   BMI 33.79 kg/m²       General Appearance:    Alert, cooperative, no distress, appears stated age   Head:    Normocephalic, without obvious abnormality, atraumatic   Lungs:     Clear to auscultation bilaterally, respirations unlabored    Heart:   Regular rate and rhythm, S1 and S2 normal    Abdomen:    Soft without tenderness   Extremities:   Extremities normal, atraumatic, no cyanosis or edema   Skin:   Skin color, texture, turgor normal, no rashes or lesions   Neurologic:   Grossly intact     Results Review:     LABS:  Lab Results   Component Value Date    WBC 10.22 2021    HGB 14.7 2021    HCT 46.2 2021    MCV 85.7 2021     2021    GLUCOSE 149 (H) 2021    BUN 31 (H) 2021    CREATININE 1.00 2021    EGFRIFNONA 56 (L) 2021     2021    K 4.9 2021     " 07/14/2021    CO2 23.0 07/14/2021    CALCIUM 11.2 (H) 07/14/2021    ALBUMIN 4.40 05/14/2021    AST 25 05/14/2021    ALT 24 05/14/2021    BILITOT 0.2 05/14/2021     Results for VAISHALI HERRERA (MRN [id number]) as of 7/16/2021 09:11   Ref. Range 2/12/2021 10:33   PTH Intact (Serial Monitor) Latest Ref Range: 15.0 - 65.0 pg/mL 67.7 (H)   TSH Baseline Latest Ref Range: 0.270 - 4.200 uIU/mL 0.530   Free T4 Latest Ref Range: 0.93 - 1.70 ng/dL 1.48     RADIOLOGY:  6/23/21 NM parathyroid scan:  IMPRESSION:  Persistent uptake of tracer inferiorly at the right lobe of  the thyroid concerning for hyperparathyroidism.    I reviewed the patient's new clinical results.    Cancer Staging (if applicable)  Cancer Patient: __ yes __no __unknown; If yes, clinical stage T:__ N:__M:__, stage group or __N/A      Impression: Primary hyperparathyroidism      Plan: PARATHYROIDECTOMY      JONO Calderon   7/16/2021   09:09 EDT

## 2021-07-16 NOTE — BRIEF OP NOTE
PARATHYROIDECTOMY  Progress Note    Izabel Abreu  7/16/2021    Pre-op Diagnosis:   Hyperparathyroidism, primary       Post-Op Diagnosis Codes:     * Hyperparathyroidism, primary (CMS/[facility]) [E21.0]    Procedure/CPT® Codes:        Procedure(s):  PARATHYROIDECTOMY    Surgeon(s):  Apolinar Suh MD    Anesthesia: General    Staff:   Circulator: Haase, Sherri L, RN; Olamide De Los Santos RN  Scrub Person: Ryan Velásquez  Assistant: Ruby Donis PA-C  Assistant: Ruby Donis PA-C      Estimated Blood Loss: minimal    Urine Voided: * No values recorded between 7/16/2021  9:23 AM and 7/16/2021 10:29 AM *    Specimens:                Specimens     ID Source Type Tests Collected By Collected At Frozen?    A Parathyroid Gland Tissue · TISSUE PATHOLOGY EXAM   Apolinar Suh MD 7/16/21 1018 Yes    Description: possible right superior  parathyroid                Drains: * No LDAs found *    Findings: Enlarged right superior parathyroid gland, confirmed by frozen section    Complications: none    Assistant: Ruby Donis PA-C  was responsible for performing the following activities: Retraction, Suturing, Closing and Placing Dressing and their skilled assistance was necessary for the success of this case.    Apolinar Suh MD     Date: 7/16/2021  Time: 10:29 EDT

## 2021-07-16 NOTE — ANESTHESIA PREPROCEDURE EVALUATION
Anesthesia Evaluation     Patient summary reviewed and Nursing notes reviewed                Airway   Mallampati: II  Dental      Pulmonary - negative pulmonary ROS   Cardiovascular     (+) hypertension,       Neuro/Psych- negative ROS  GI/Hepatic/Renal/Endo    (+)   diabetes mellitus,     Musculoskeletal (-) negative ROS    Abdominal    Substance History - negative use     OB/GYN negative ob/gyn ROS         Other                        Anesthesia Plan    ASA 3     general     intravenous induction     Anesthetic plan, all risks, benefits, and alternatives have been provided, discussed and informed consent has been obtained with: patient.

## 2021-07-16 NOTE — OP NOTE
General Surgery Operative Note    PARATHYROIDECTOMY  Procedure Report    Patient Name:  Izabel Abreu  YOB: 1955  [id number]     Date of Surgery:  7/16/2021       Pre-op Diagnosis: Hyperparathyroidism    Post-op Diagnosis: Hyperparathyroidism      Procedure(s):  PARATHYROIDECTOMY    Surgeon: Apolinar Suh MD    Assistant: Assistant: Ruby Donis PA-C     Anesthesia: General    Estimated Blood Loss: minimal    Implants:    Implant Name Type Inv. Item Serial No.  Lot No. LRB No. Used Action   CLIP LIGAT VASC HORIZON TI SM YEL 6CT - [id number] Implant CLIP LIGAT VASC HORIZON TI SM YEL 6CT  [employer]  N/A 1 Implanted       Specimen:          Specimens     ID Source Type Tests Collected By Collected At Frozen?    A Parathyroid Gland Tissue · TISSUE PATHOLOGY EXAM   Apolinar Suh MD 7/16/21 1018 Yes    Description: possible right superior  parathyroid              Findings: Enlarged right superior parathyroid gland, confirmed by frozen section    Indications: Patient is a 65-year-old female who was noted on blood work to have high calcium level.  Parathyroid hormone level was checked and was also elevated consistent with primary hyperparathyroidism.  Discussions were made with patient on various treatment options and the patient was found to be agreeable to parathyroidectomy.  Sestamibi scan was performed preoperatively which localized a likely right-sided adenoma.  Informed consent was obtained.    Description of Procedure:     After obtaining informed consent, the patient was taken to the operating room and placed in supine position. After appropriate DVT and antibiotic prophylaxis, general anesthesia was induced. The neck was prepped and draped in standard sterile fashion.    Approximately 5 cm incision was made 2 fingerbreadth superior to the suprasternal notch transversely across the midline neck.  The skin was incised using a 15 blade.  The dermis and  subcutaneous tissue were divided in Bovie electrocautery.  The platysma was dissected out and divided using Bovie electrocautery.  Subplatysmal flaps were created superiorly to the thyroid cartilage inferiorly to the sternal notch and laterally to the sternocleidomastoid muscles.  The strap muscles were divided in the midline at the median raphae.  Strap muscles were dissected off the anterior and lateral surface of the right thyroid lobe.  Babcocks were placed in the thyroid lobe and retracted anteriorly and medially.  Posterior to the thyroid lobe an enlarged ovoid lesion was identified which was visually consistent with a right superior parathyroid adenoma.  This was circumferentially dissected using bipolar cautery and small vessels ligated with clips.  It was then sent to pathology as a frozen section and was found to be consistent with hypercellular parathyroid tissue.  Valsalva maneuver was performed anesthesia to assess for further bleeding and no further bleeding was noted.  The recurrent laryngeal nerve stimulated properly deep to the area of dissection.  Surgicel was placed in the area of dissection.  The strap muscles were loosely reapproximated using interrupted 3-0 Vicryl.  The platysma was reapproximated using interrupted 3-0 Vicryl.  The skin was reapproximated using a running subcuticular 4-0 Monocryl.  Dry dressing applied on top of this.    All sponge and needle counts were correct times two at the completion of the procedure. The patient recovered from anesthesia, was extubated in the operating room and was transported to the PACU in stable condition.        Assistant: Ruby Donis PA-C  was responsible for performing the following activities: Retraction, Suturing, Closing and Placing Dressing and their skilled assistance was necessary for the success of this case.    Apolinar Suh MD     Date: 7/16/2021  Time: 11:00 EDT

## 2021-07-19 LAB
CYTO UR: NORMAL
LAB AP CASE REPORT: NORMAL
LAB AP CLINICAL INFORMATION: NORMAL
Lab: NORMAL
PATH REPORT.FINAL DX SPEC: NORMAL
PATH REPORT.GROSS SPEC: NORMAL

## 2021-07-22 NOTE — ANESTHESIA POSTPROCEDURE EVALUATION
Patient: Izabel Abreu    Procedure Summary     Date: 07/16/21 Room / Location:  [facility]  /  [facility]    Anesthesia Start: 0923 Anesthesia Stop: 1041    Procedure: PARATHYROIDECTOMY (N/A Neck) Diagnosis: Hyperparathyroidism, primary (CMS/[facility])    Surgeons: Apolinar Suh MD Provider: Christopher Lopez MD    Anesthesia Type: general ASA Status: 3          Anesthesia Type: general    Vitals  Vitals Value Taken Time   /71 07/16/21 1230   Temp 97.6 °F (36.4 °C) 07/16/21 1230   Pulse 73 07/16/21 1235   Resp 18 07/16/21 1230   SpO2 96 % 07/16/21 1235           Post Anesthesia Care and Evaluation    Patient location during evaluation: PACU  Patient participation: complete - patient participated  Level of consciousness: awake and alert  Pain management: adequate  Airway patency: patent  Anesthetic complications: No anesthetic complications  PONV Status: none  Cardiovascular status: hemodynamically stable and acceptable  Respiratory status: nonlabored ventilation, acceptable and nasal cannula  Hydration status: acceptable

## 2021-07-29 ENCOUNTER — TRANSCRIBE ORDERS (OUTPATIENT)
Dept: LAB | Facility: HOSPITAL | Age: 66
End: 2021-07-29

## 2021-07-29 ENCOUNTER — LAB (OUTPATIENT)
Dept: LAB | Facility: HOSPITAL | Age: 66
End: 2021-07-29

## 2021-07-29 DIAGNOSIS — E83.52 HYPERCALCEMIA: Primary | ICD-10-CM

## 2021-07-29 DIAGNOSIS — E05.90 SUBCLINICAL HYPERTHYROIDISM: ICD-10-CM

## 2021-07-29 DIAGNOSIS — E83.52 HYPERCALCEMIA: ICD-10-CM

## 2021-07-29 LAB
CALCIUM SPEC-SCNC: 10.3 MG/DL (ref 8.6–10.5)
PTH-INTACT SERPL-MCNC: 33.3 PG/ML (ref 15–65)

## 2021-07-29 PROCEDURE — 82306 VITAMIN D 25 HYDROXY: CPT

## 2021-07-29 PROCEDURE — 82310 ASSAY OF CALCIUM: CPT

## 2021-07-29 PROCEDURE — 83970 ASSAY OF PARATHORMONE: CPT

## 2021-07-30 LAB — 25(OH)D3 SERPL-MCNC: 17.4 NG/ML

## 2021-08-20 ENCOUNTER — OFFICE VISIT (OUTPATIENT)
Dept: ENDOCRINOLOGY | Facility: CLINIC | Age: 66
End: 2021-08-20

## 2021-08-20 VITALS
DIASTOLIC BLOOD PRESSURE: 66 MMHG | WEIGHT: 183 LBS | SYSTOLIC BLOOD PRESSURE: 140 MMHG | HEIGHT: 62 IN | HEART RATE: 76 BPM | BODY MASS INDEX: 33.68 KG/M2 | OXYGEN SATURATION: 95 %

## 2021-08-20 DIAGNOSIS — E11.65 UNCONTROLLED TYPE 2 DIABETES MELLITUS WITH HYPERGLYCEMIA (HCC): Primary | ICD-10-CM

## 2021-08-20 PROBLEM — E83.52 HYPERCALCEMIA: Status: RESOLVED | Noted: 2021-02-12 | Resolved: 2021-08-20

## 2021-08-20 LAB
EXPIRATION DATE: ABNORMAL
EXPIRATION DATE: NORMAL
GLUCOSE BLDC GLUCOMTR-MCNC: 150 MG/DL (ref 70–130)
HBA1C MFR BLD: 7.2 %
Lab: ABNORMAL
Lab: NORMAL

## 2021-08-20 PROCEDURE — 82947 ASSAY GLUCOSE BLOOD QUANT: CPT | Performed by: INTERNAL MEDICINE

## 2021-08-20 PROCEDURE — 83036 HEMOGLOBIN GLYCOSYLATED A1C: CPT | Performed by: INTERNAL MEDICINE

## 2021-08-20 PROCEDURE — 99213 OFFICE O/P EST LOW 20 MIN: CPT | Performed by: INTERNAL MEDICINE

## 2021-08-20 RX ORDER — BLOOD SUGAR DIAGNOSTIC
STRIP MISCELLANEOUS
Qty: 100 EACH | Refills: 3 | Status: SHIPPED | OUTPATIENT
Start: 2021-08-20 | End: 2022-06-02

## 2021-08-20 RX ORDER — DULAGLUTIDE 3 MG/.5ML
0.5 INJECTION, SOLUTION SUBCUTANEOUS WEEKLY
Qty: 6 ML | Refills: 3 | Status: SHIPPED | OUTPATIENT
Start: 2021-08-20 | End: 2022-03-25 | Stop reason: SDUPTHER

## 2021-08-20 NOTE — ASSESSMENT & PLAN NOTE
Improved but we need to stop metformin due to GI side effects  Stop lasix due urinary incontinence.  Increase trulicity   Keep track of bp at home

## 2021-08-20 NOTE — PROGRESS NOTES
"     Office Note      Date: 2021  Patient Name: Izabel Abreu  MRN: [id number]  : 1955    Chief Complaint   Patient presents with   • Diabetes       History of Present Illness:   Izabel Abreu is a 65 y.o. female who presents for Diabetes  On trulicity and metformin.  She notes issues with fecal incontinence and urinary incontinence.  Since last time she has parathyroid adenoma removed. And her last calcium was normal and her pth was normal.    Last A1c:  Hemoglobin A1C   Date Value Ref Range Status   2021 7.2 % Final       Changes in health since last visit: see above . Last eye exam due  In october.    Subjective          Review of Systems:   Review of Systems   Gastrointestinal:        Fecal incontinence   Genitourinary: Positive for enuresis.   Psychiatric/Behavioral: Positive for sleep disturbance.       The following portions of the patient's history were reviewed and updated as appropriate: allergies, current medications, past family history, past medical history, past social history, past surgical history and problem list.    Objective     Visit Vitals  /66   Pulse 76   Ht 157.5 cm (62\")   Wt 83 kg (183 lb)   SpO2 95%   BMI 33.47 kg/m²       Labs:    CMP  Lab Results   Component Value Date    GLUCOSE 149 (H) 2021    BUN 31 (H) 2021    CREATININE 1.00 2021    EGFRIFNONA 56 (L) 2021    BCR 31.0 (H) 2021    K 4.9 2021    CO2 23.0 2021    CALCIUM 10.3 2021    AST 25 2021    ALT 24 2021        CBC w/DIFF  Lab Results   Component Value Date    WBC 10.22 2021    RBC 5.39 (H) 2021    HGB 14.7 2021    HCT 46.2 2021    MCV 85.7 2021    MCH 27.3 2021    MCHC 31.8 2021    RDW 15.1 2021    RDWSD 46.7 2021    MPV 11.3 2021     2021       Physical Exam:  Physical Exam     Assessment / Plan      Assessment & Plan:  Problem List Items Addressed This Visit     "    Other    Uncontrolled type 2 diabetes mellitus with hyperglycemia (CMS/[facility]) - Primary    Current Assessment & Plan     Improved but we need to stop metformin due to GI side effects  Stop lasix due urinary incontinence.  Increase trulicity   Keep track of bp at home          Relevant Orders    POC Glycosylated Hemoglobin (Hb A1C) (Completed)    POC Glucose, Blood (Completed)           Dez Sánchez MD   08/20/2021

## 2021-09-20 RX ORDER — SIMVASTATIN 40 MG
40 TABLET ORAL DAILY
Qty: 90 TABLET | Refills: 1 | Status: SHIPPED | OUTPATIENT
Start: 2021-09-20 | End: 2021-09-27 | Stop reason: SDUPTHER

## 2021-09-27 RX ORDER — SIMVASTATIN 40 MG
40 TABLET ORAL DAILY
Qty: 90 TABLET | Refills: 1 | Status: SHIPPED | OUTPATIENT
Start: 2021-09-27 | End: 2021-12-16

## 2021-09-28 RX ORDER — AMLODIPINE BESYLATE 5 MG/1
5 TABLET ORAL DAILY
Qty: 90 TABLET | Refills: 1 | Status: SHIPPED | OUTPATIENT
Start: 2021-09-28 | End: 2022-02-25 | Stop reason: SDUPTHER

## 2021-09-28 NOTE — TELEPHONE ENCOUNTER
PATIENT NEEDS REFILL OF AMLODITLYNE CALLED IN AT [facility].PATIENT WOULD LIKE A CALL BACK.

## 2021-11-30 RX ORDER — LOSARTAN POTASSIUM 100 MG/1
100 TABLET ORAL DAILY
Qty: 90 TABLET | Refills: 1 | Status: SHIPPED | OUTPATIENT
Start: 2021-11-30 | End: 2022-02-25 | Stop reason: SDUPTHER

## 2021-12-06 RX ORDER — BISOPROLOL FUMARATE 10 MG/1
10 TABLET, FILM COATED ORAL DAILY
Qty: 90 TABLET | Refills: 1 | Status: SHIPPED | OUTPATIENT
Start: 2021-12-06 | End: 2022-02-25 | Stop reason: SDUPTHER

## 2021-12-16 RX ORDER — SIMVASTATIN 40 MG
40 TABLET ORAL DAILY
Qty: 90 TABLET | Refills: 1 | Status: SHIPPED | OUTPATIENT
Start: 2021-12-16 | End: 2022-02-25 | Stop reason: SDUPTHER

## 2022-02-25 ENCOUNTER — LAB (OUTPATIENT)
Dept: LAB | Facility: HOSPITAL | Age: 67
End: 2022-02-25

## 2022-02-25 ENCOUNTER — OFFICE VISIT (OUTPATIENT)
Dept: ENDOCRINOLOGY | Facility: CLINIC | Age: 67
End: 2022-02-25

## 2022-02-25 VITALS
OXYGEN SATURATION: 97 % | SYSTOLIC BLOOD PRESSURE: 130 MMHG | DIASTOLIC BLOOD PRESSURE: 84 MMHG | HEIGHT: 62 IN | WEIGHT: 182 LBS | BODY MASS INDEX: 33.49 KG/M2 | HEART RATE: 77 BPM

## 2022-02-25 DIAGNOSIS — E11.65 UNCONTROLLED TYPE 2 DIABETES MELLITUS WITH HYPERGLYCEMIA: Primary | ICD-10-CM

## 2022-02-25 DIAGNOSIS — E11.65 UNCONTROLLED TYPE 2 DIABETES MELLITUS WITH HYPERGLYCEMIA: ICD-10-CM

## 2022-02-25 LAB
ALBUMIN SERPL-MCNC: 4.9 G/DL (ref 3.5–5.2)
ALBUMIN UR-MCNC: <1.2 MG/DL
ALBUMIN/GLOB SERPL: 1.9 G/DL
ALP SERPL-CCNC: 53 U/L (ref 39–117)
ALT SERPL W P-5'-P-CCNC: 26 U/L (ref 1–33)
ANION GAP SERPL CALCULATED.3IONS-SCNC: 14.7 MMOL/L (ref 5–15)
AST SERPL-CCNC: 26 U/L (ref 1–32)
BILIRUB SERPL-MCNC: 0.3 MG/DL (ref 0–1.2)
BUN SERPL-MCNC: 20 MG/DL (ref 8–23)
BUN/CREAT SERPL: 31.7 (ref 7–25)
CALCIUM SPEC-SCNC: 10.2 MG/DL (ref 8.6–10.5)
CHLORIDE SERPL-SCNC: 101 MMOL/L (ref 98–107)
CHOLEST SERPL-MCNC: 165 MG/DL (ref 0–200)
CO2 SERPL-SCNC: 22.3 MMOL/L (ref 22–29)
CREAT SERPL-MCNC: 0.63 MG/DL (ref 0.57–1)
CREAT UR-MCNC: 29.1 MG/DL
EXPIRATION DATE: ABNORMAL
EXPIRATION DATE: NORMAL
GFR SERPL CREATININE-BSD FRML MDRD: 95 ML/MIN/1.73
GLOBULIN UR ELPH-MCNC: 2.6 GM/DL
GLUCOSE BLDC GLUCOMTR-MCNC: 182 MG/DL (ref 70–130)
GLUCOSE SERPL-MCNC: 176 MG/DL (ref 65–99)
HBA1C MFR BLD: 7.9 %
HDLC SERPL-MCNC: 48 MG/DL (ref 40–60)
LDLC SERPL CALC-MCNC: 96 MG/DL (ref 0–100)
LDLC/HDLC SERPL: 1.96 {RATIO}
Lab: ABNORMAL
Lab: NORMAL
MICROALBUMIN/CREAT UR: NORMAL MG/G{CREAT}
POTASSIUM SERPL-SCNC: 4.2 MMOL/L (ref 3.5–5.2)
PROT SERPL-MCNC: 7.5 G/DL (ref 6–8.5)
SODIUM SERPL-SCNC: 138 MMOL/L (ref 136–145)
TRIGL SERPL-MCNC: 115 MG/DL (ref 0–150)
TSH SERPL DL<=0.05 MIU/L-ACNC: 0.55 UIU/ML (ref 0.27–4.2)
VLDLC SERPL-MCNC: 21 MG/DL (ref 5–40)

## 2022-02-25 PROCEDURE — 99214 OFFICE O/P EST MOD 30 MIN: CPT | Performed by: INTERNAL MEDICINE

## 2022-02-25 PROCEDURE — 82947 ASSAY GLUCOSE BLOOD QUANT: CPT | Performed by: INTERNAL MEDICINE

## 2022-02-25 PROCEDURE — 83036 HEMOGLOBIN GLYCOSYLATED A1C: CPT | Performed by: INTERNAL MEDICINE

## 2022-02-25 PROCEDURE — 80053 COMPREHEN METABOLIC PANEL: CPT

## 2022-02-25 PROCEDURE — 84443 ASSAY THYROID STIM HORMONE: CPT

## 2022-02-25 PROCEDURE — 82043 UR ALBUMIN QUANTITATIVE: CPT

## 2022-02-25 PROCEDURE — 80061 LIPID PANEL: CPT

## 2022-02-25 PROCEDURE — 82570 ASSAY OF URINE CREATININE: CPT

## 2022-02-25 RX ORDER — FENOFIBRATE 134 MG/1
134 CAPSULE ORAL DAILY
Qty: 90 CAPSULE | Refills: 3 | Status: SHIPPED | OUTPATIENT
Start: 2022-02-25 | End: 2022-08-05 | Stop reason: SDUPTHER

## 2022-02-25 RX ORDER — SIMVASTATIN 40 MG
40 TABLET ORAL DAILY
Qty: 90 TABLET | Refills: 1 | Status: SHIPPED | OUTPATIENT
Start: 2022-02-25 | End: 2022-08-05 | Stop reason: SDUPTHER

## 2022-02-25 RX ORDER — LOSARTAN POTASSIUM 100 MG/1
100 TABLET ORAL DAILY
Qty: 90 TABLET | Refills: 1 | Status: SHIPPED | OUTPATIENT
Start: 2022-02-25 | End: 2022-08-05 | Stop reason: SDUPTHER

## 2022-02-25 RX ORDER — BISOPROLOL FUMARATE 10 MG/1
10 TABLET, FILM COATED ORAL DAILY
Qty: 90 TABLET | Refills: 1 | Status: SHIPPED | OUTPATIENT
Start: 2022-02-25 | End: 2022-08-05 | Stop reason: SDUPTHER

## 2022-02-25 RX ORDER — PIOGLITAZONEHYDROCHLORIDE 30 MG/1
30 TABLET ORAL DAILY
Qty: 90 TABLET | Refills: 3 | Status: SHIPPED | OUTPATIENT
Start: 2022-02-25 | End: 2022-08-05

## 2022-02-25 RX ORDER — AMLODIPINE BESYLATE 5 MG/1
5 TABLET ORAL DAILY
Qty: 90 TABLET | Refills: 1 | Status: SHIPPED | OUTPATIENT
Start: 2022-02-25 | End: 2022-08-05 | Stop reason: SDUPTHER

## 2022-02-25 NOTE — ASSESSMENT & PLAN NOTE
Diabetes is worsening.   Continue current treatment regimen.  Diabetes will be reassessed in 6 months     Will add saad to try to get the a1c down .

## 2022-02-25 NOTE — PROGRESS NOTES
"     Office Note      Date: 2022  Patient Name: Izabel Abreu  MRN: [id number]  : 1955    Chief Complaint   Patient presents with   • Diabetes       History of Present Illness:   Izabel Abreu is a 66 y.o. female who presents for Diabetes - type 2  On trulicity and metformim- we had her stop the metformin last time due to GI side effects but then her sugar went high and she had to go back on it due to hyperglycemia.  But on the lower dose of metformin she has less gi side effects  But she notes persistent hyperglycemia      Last A1c:  Hemoglobin A1C   Date Value Ref Range Status   2022 7.9 % Final       Changes in health since last visit: bilateral oophorectomy . Last eye exam due .    Subjective        Review of Systems:   Review of Systems   Constitutional: Negative.    HENT: Negative.    Endocrine: Positive for heat intolerance.       The following portions of the patient's history were reviewed and updated as appropriate: allergies, current medications, past family history, past medical history, past social history, past surgical history and problem list.    Objective     Visit Vitals  /84   Pulse 77   Ht 157.5 cm (62\")   Wt 82.6 kg (182 lb)   SpO2 97%   BMI 33.29 kg/m²       Labs:    CMP  Lab Results   Component Value Date    GLUCOSE 149 (H) 2021    BUN 31 (H) 2021    CREATININE 1.00 2021    EGFRIFNONA 56 (L) 2021    BCR 31.0 (H) 2021    K 4.9 2021    CO2 23.0 2021    CALCIUM 10.3 2021    AST 25 2021    ALT 24 2021        CBC w/DIFF  Lab Results   Component Value Date    WBC 10.22 2021    RBC 5.39 (H) 2021    HGB 14.7 2021    HCT 46.2 2021    MCV 85.7 2021    MCH 27.3 2021    MCHC 31.8 2021    RDW 15.1 2021    RDWSD 46.7 2021    MPV 11.3 2021     2021       Physical Exam:  Physical Exam  Vitals reviewed.   Constitutional:       Appearance: Normal " appearance. She is normal weight.   Cardiovascular:      Pulses:           Dorsalis pedis pulses are 2+ on the right side and 2+ on the left side.        Posterior tibial pulses are 2+ on the right side and 2+ on the left side.   Musculoskeletal:      Right foot: Normal range of motion. No deformity, bunion, Charcot foot, foot drop or prominent metatarsal heads.      Left foot: Normal range of motion. No deformity, bunion, Charcot foot, foot drop or prominent metatarsal heads.   Feet:      Right foot:      Protective Sensation: 5 sites tested. 5 sites sensed.      Skin integrity: Skin integrity normal.      Toenail Condition: Right toenails are normal.      Left foot:      Protective Sensation: 5 sites tested. 5 sites sensed.      Skin integrity: Skin integrity normal.      Toenail Condition: Left toenails are normal.      Comments: Diabetic Foot Exam Performed and Monofilament Test Performed    Neurological:      Mental Status: She is alert.   Psychiatric:         Mood and Affect: Mood normal.         Thought Content: Thought content normal.         Judgment: Judgment normal.          Assessment / Plan      Assessment & Plan:  Problem List Items Addressed This Visit        Other    Uncontrolled type 2 diabetes mellitus with hyperglycemia (HCC) - Primary    Current Assessment & Plan     Diabetes is worsening.   Continue current treatment regimen.  Diabetes will be reassessed in 6 months     Will add saad to try to get the a1c down .         Relevant Medications    Dulaglutide (Trulicity) 3 MG/0.5ML solution pen-injector    pioglitazone (Actos) 30 MG tablet    metFORMIN (GLUCOPHAGE) 1000 MG tablet    Other Relevant Orders    POC Glycosylated Hemoglobin (Hb A1C) (Completed)    POC Glucose, Blood (Completed)    Comprehensive Metabolic Panel    Lipid Panel    Microalbumin / Creatinine Urine Ratio - Urine, Clean Catch    TSH           Dez Sánchez MD   02/25/2022

## 2022-03-25 DIAGNOSIS — IMO0002 DIABETES MELLITUS TYPE 2, UNCONTROLLED, WITH COMPLICATIONS: ICD-10-CM

## 2022-03-25 RX ORDER — DULAGLUTIDE 0.75 MG/.5ML
INJECTION, SOLUTION SUBCUTANEOUS
Qty: 2 ML | Refills: 5 | OUTPATIENT
Start: 2022-03-25

## 2022-03-25 RX ORDER — DULAGLUTIDE 3 MG/.5ML
0.5 INJECTION, SOLUTION SUBCUTANEOUS WEEKLY
Qty: 6 ML | Refills: 3 | Status: SHIPPED | OUTPATIENT
Start: 2022-03-25 | End: 2022-09-08

## 2022-03-25 NOTE — TELEPHONE ENCOUNTER
DC the previous request for a different dose. Pharmacy must have sent the request for the incorrect dose. I will pend the dose in pt chart  Last OV 2/25/22 Future appt 8/5/22

## 2022-03-30 NOTE — TELEPHONE ENCOUNTER
PATIENT IS CALLING BACK STATING EVERYTHING HAS BEEN WORKED OUT WITH PHARMACY OVER THIS PRESCRIPTION. SHE DOES NOT NEED US TO DO ANYTHING FURTHER WITH THIS MEDICATION.

## 2022-03-30 NOTE — TELEPHONE ENCOUNTER
PATIENT IS CALLING TO CHECK STATUS OF PRIOR AUTH FOR TRULICITY. PHARMACY HAS SENT US THE DENIAL TO AND REQUEST FOR PRIOR AUTH. SHE NEEDS TO BE ABLE TO GET PRESCRIPTION AS SHE RUNNING OUT. PHONE NUMBER [phone]

## 2022-05-16 ENCOUNTER — TELEPHONE (OUTPATIENT)
Dept: FAMILY MEDICINE CLINIC | Facility: CLINIC | Age: 67
End: 2022-05-16

## 2022-05-17 ENCOUNTER — TELEMEDICINE (OUTPATIENT)
Dept: FAMILY MEDICINE CLINIC | Facility: CLINIC | Age: 67
End: 2022-05-17

## 2022-05-17 VITALS — WEIGHT: 182 LBS | HEIGHT: 62 IN | BODY MASS INDEX: 33.49 KG/M2

## 2022-05-17 DIAGNOSIS — S80.861A INSECT BITE OF RIGHT LOWER LEG, INITIAL ENCOUNTER: ICD-10-CM

## 2022-05-17 DIAGNOSIS — G89.29 CHRONIC PAIN OF RIGHT KNEE: Primary | ICD-10-CM
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
## 2022-05-17 DIAGNOSIS — M25.561 CHRONIC PAIN OF RIGHT KNEE: Primary | ICD-10-CM

## 2022-05-17 PROCEDURE — 99203 OFFICE O/P NEW LOW 30 MIN: CPT | Performed by: PHYSICIAN ASSISTANT

## 2022-05-17 RX ORDER — NABUMETONE 750 MG/1
TABLET, FILM COATED ORAL
Qty: 60 TABLET | Refills: 5 | Status: SHIPPED | OUTPATIENT
Start: 2022-05-17 | End: 2022-11-30

## 2022-05-18 PROBLEM — M15.9 OSTEOARTHRITIS OF MULTIPLE JOINTS: Status: ACTIVE | Noted: 2022-05-18

## 2022-05-18 PROBLEM — M15.9 OSTEOARTHRITIS OF MULTIPLE JOINTS: Status: RESOLVED | Noted: 2022-05-18 | Resolved: 2022-05-18

## 2022-05-19 NOTE — ASSESSMENT & PLAN NOTE
Patient prescribed nabumetone to take as needed for relief of symptoms.  Also advised stretching and daily exercise  Call if any problems arise

## 2022-05-19 NOTE — PROGRESS NOTES
"Mode of Visit: Video  Location of patient: home  You have chosen to receive care through a telehealth visit.  The patient has signed the video visit consent form.  The visit included audio only interaction. Patient was unable to connect to [employer] so visit was audio only.  Chief Complaint  Insect Bite (Patient reports that she has 20 horse fly bites very itchy) and Arthritis (Patient states that she is having leg pain and is worse at night)    Subjective          Izabel Abreu presents to [facility]  Patient reports today secondary to being bitten by flies over the weekend.  Patient reports she has itchy bumps over her body.  States she has been using clobetasol and feels better today.    Patient reports having chronic knee pain.  Reports she is going to Ortho. States she would like a medication to help as it is interrupting her sleep    Objective   Vital Signs:   Ht 157.5 cm (62\")   Wt 82.6 kg (182 lb)   BMI 33.29 kg/m²     Physical Exam   Pulmonary/Chest: Effort normal.   Psychiatric: She has a normal mood and affect.     Result Review :                 Assessment and Plan    Diagnoses and all orders for this visit:    1. Chronic pain of right knee (Primary)  Assessment & Plan:  Patient prescribed nabumetone to take as needed for relief of symptoms.  Also advised stretching and daily exercise  Call if any problems arise    Orders:  -     nabumetone (Relafen) 750 MG tablet; Take 1 tab po Bid for pain with food  Dispense: 60 tablet; Refill: 5    2. Insect bite of right lower leg, initial encounter  Assessment & Plan:  Patient will continue current treatment plan with previously prescribed steroid cream, advised her to add in antihistamine for further relief.          Follow Up   No follow-ups on file.  Patient was given instructions and counseling regarding her condition or for health maintenance advice. Please see specific information pulled into the AVS if appropriate.       "

## 2022-05-19 NOTE — ASSESSMENT & PLAN NOTE
Patient will continue current treatment plan with previously prescribed steroid cream, advised her to add in antihistamine for further relief.

## 2022-06-02 RX ORDER — BLOOD SUGAR DIAGNOSTIC
STRIP MISCELLANEOUS
Qty: 100 EACH | Refills: 11 | Status: SHIPPED | OUTPATIENT
Start: 2022-06-02

## 2022-07-26 ENCOUNTER — TELEPHONE (OUTPATIENT)
Dept: FAMILY MEDICINE CLINIC | Facility: CLINIC | Age: 67
End: 2022-07-26

## 2022-07-26 NOTE — TELEPHONE ENCOUNTER
Caller: Izabel Abreu    Relationship to patient: Self    Best call back number:[phone]     Date of exposure: 07/23/22    PATIENT WAS EXPOSED TO COVID ON Saturday AND WANTS TO KNOW IF SHE CAN BE TESTED- PATIENT DOES NOT HAVE ANY SYMPTOMS.     PATIENT WANTS TO BE TESTED WHERE SHE IS DIABETES

## 2022-08-05 ENCOUNTER — OFFICE VISIT (OUTPATIENT)
Dept: ENDOCRINOLOGY | Facility: CLINIC | Age: 67
End: 2022-08-05

## 2022-08-05 VITALS
HEART RATE: 81 BPM | WEIGHT: 183 LBS | SYSTOLIC BLOOD PRESSURE: 122 MMHG | BODY MASS INDEX: 42.35 KG/M2 | HEIGHT: 55 IN | DIASTOLIC BLOOD PRESSURE: 80 MMHG

## 2022-08-05 DIAGNOSIS — E11.65 UNCONTROLLED TYPE 2 DIABETES MELLITUS WITH HYPERGLYCEMIA: Primary | ICD-10-CM

## 2022-08-05 LAB
EXPIRATION DATE: ABNORMAL
EXPIRATION DATE: NORMAL
GLUCOSE BLDC GLUCOMTR-MCNC: 165 MG/DL (ref 70–130)
HBA1C MFR BLD: 7.4 %
Lab: ABNORMAL
Lab: NORMAL

## 2022-08-05 PROCEDURE — 99213 OFFICE O/P EST LOW 20 MIN: CPT | Performed by: INTERNAL MEDICINE

## 2022-08-05 PROCEDURE — 82947 ASSAY GLUCOSE BLOOD QUANT: CPT | Performed by: INTERNAL MEDICINE

## 2022-08-05 PROCEDURE — 83036 HEMOGLOBIN GLYCOSYLATED A1C: CPT | Performed by: INTERNAL MEDICINE

## 2022-08-05 RX ORDER — BISOPROLOL FUMARATE 10 MG/1
10 TABLET, FILM COATED ORAL DAILY
Qty: 90 TABLET | Refills: 1 | Status: SHIPPED | OUTPATIENT
Start: 2022-08-05 | End: 2022-08-25

## 2022-08-05 RX ORDER — AMLODIPINE BESYLATE 5 MG/1
5 TABLET ORAL DAILY
Qty: 90 TABLET | Refills: 1 | Status: SHIPPED | OUTPATIENT
Start: 2022-08-05 | End: 2023-02-10 | Stop reason: SDUPTHER

## 2022-08-05 RX ORDER — LOSARTAN POTASSIUM 100 MG/1
100 TABLET ORAL DAILY
Qty: 90 TABLET | Refills: 1 | Status: SHIPPED | OUTPATIENT
Start: 2022-08-05 | End: 2022-08-25

## 2022-08-05 RX ORDER — FENOFIBRATE 134 MG/1
134 CAPSULE ORAL DAILY
Qty: 90 CAPSULE | Refills: 3 | Status: SHIPPED | OUTPATIENT
Start: 2022-08-05 | End: 2023-02-10 | Stop reason: SDUPTHER

## 2022-08-05 RX ORDER — SIMVASTATIN 40 MG
40 TABLET ORAL DAILY
Qty: 90 TABLET | Refills: 1 | Status: SHIPPED | OUTPATIENT
Start: 2022-08-05

## 2022-08-05 RX ORDER — DIPHENHYDRAMINE HCL 25 MG
25 CAPSULE ORAL EVERY 6 HOURS PRN
COMMUNITY
End: 2022-10-21

## 2022-08-05 NOTE — PROGRESS NOTES
"     Office Note      Date: 2022  Patient Name: Izabel Abreu  MRN: [id number]  : 1955    Chief Complaint   Patient presents with   • Diabetes       History of Present Illness:   Izabel Abreu is a 66 y.o. female who presents for Diabetes - type 2  Rx: trulicity and metformin. Last time we reduced  metformin due to side effects and used saad but she stopped that due to edema   ldl was 96  egfr was 95  kvng and tsh were normal.  Foot check is up to date     Last A1c:  Hemoglobin A1C   Date Value Ref Range Status   2022 7.4 % Final       Changes in health since last visit: on meds for arthritis . Last eye exam   .    Subjective          Review of Systems:   Review of Systems   Constitutional: Positive for unexpected weight change.   Psychiatric/Behavioral:        Lots of stress        The following portions of the patient's history were reviewed and updated as appropriate: allergies, current medications, past family history, past medical history, past social history, past surgical history and problem list.    Objective     Visit Vitals  /80   Pulse 81   Ht 62 cm (24.41\")   Wt 83 kg (183 lb)   .95 kg/m²       Labs:    CMP  Lab Results   Component Value Date    GLUCOSE 176 (H) 2022    BUN 20 2022    CREATININE 0.63 2022    EGFRIFNONA 95 2022    BCR 31.7 (H) 2022    K 4.2 2022    CO2 22.3 2022    CALCIUM 10.2 2022    AST 26 2022    ALT 26 2022        CBC w/DIFF  Lab Results   Component Value Date    WBC 10.22 2021    RBC 5.39 (H) 2021    HGB 14.7 2021    HCT 46.2 2021    MCV 85.7 2021    MCH 27.3 2021    MCHC 31.8 2021    RDW 15.1 2021    RDWSD 46.7 2021    MPV 11.3 2021     2021       Physical Exam:  Physical Exam  Vitals reviewed.   Constitutional:       Appearance: Normal appearance.   HENT:      Head: Normocephalic and atraumatic. "   Neurological:      Mental Status: She is alert.   Psychiatric:         Mood and Affect: Mood normal.         Thought Content: Thought content normal.         Judgment: Judgment normal.          Assessment / Plan      Assessment & Plan:  Problem List Items Addressed This Visit        Other    Uncontrolled type 2 diabetes mellitus with hyperglycemia (HCC) - Primary    Overview     Intolerant of saad  Intolerant on sglt2          Current Assessment & Plan      a1c is improved from 7.9 to 7.4.. our goal is under 7.5 so I am ok with that          Relevant Medications    Dulaglutide (Trulicity) 3 MG/0.5ML solution pen-injector    metFORMIN (GLUCOPHAGE) 1000 MG tablet    Other Relevant Orders    POC Glucose, Blood (Completed)    POC Glycosylated Hemoglobin (Hb A1C) (Completed)           Dez Sánchez MD   08/05/2022

## 2022-08-25 RX ORDER — LOSARTAN POTASSIUM 100 MG/1
100 TABLET ORAL DAILY
Qty: 90 TABLET | Refills: 1 | Status: SHIPPED | OUTPATIENT
Start: 2022-08-25

## 2022-08-25 RX ORDER — BISOPROLOL FUMARATE 10 MG/1
10 TABLET, FILM COATED ORAL DAILY
Qty: 90 TABLET | Refills: 1 | Status: SHIPPED | OUTPATIENT
Start: 2022-08-25 | End: 2023-02-10 | Stop reason: SDUPTHER

## 2022-08-29 RX ORDER — CEFDINIR 300 MG/1
300 CAPSULE ORAL 2 TIMES DAILY
Qty: 20 CAPSULE | Refills: 0 | Status: SHIPPED | OUTPATIENT
Start: 2022-08-29 | End: 2022-09-02

## 2022-08-31 ENCOUNTER — TELEPHONE (OUTPATIENT)
Dept: FAMILY MEDICINE CLINIC | Facility: CLINIC | Age: 67
End: 2022-08-31

## 2022-08-31 NOTE — TELEPHONE ENCOUNTER
Caller: Izabel Abreu    Relationship to patient: Self    Best call back number: [phone]    Date of positive COVID19 test: 08/31/2022 WITH HOME TEST         COVID19 symptoms: HEADACHE SORE THROAT COUGH        Additional information or concerns: THE PATIENT STATES THAT SHE TESTED POSITIVE FOR COVID-19 08/31/2022 THE PATIENT WOULD LIKE TO KNOW IF THE DOCTOR WANTS TO CALL IN MEDICATION FOR HER     What is the patients preferred pharmacy: [facility]

## 2022-09-01 NOTE — TELEPHONE ENCOUNTER
Please advise patient that she needs an appointment if she wants to be prescribed the antiviral medication.  A virtual video visit would be fine.  Please schedule, thanks

## 2022-09-01 NOTE — TELEPHONE ENCOUNTER
Caller: Izabel Abreu    Relationship to patient: Self    Best call back number: [phone]    Patient is needing: PATIENT IS WANTING TO KNOW WHAT TO DO. PATIENT'S  RECEIVED PAXLOVID AT HIS APPOINTMENT AND PATIENT WAS PRESCRIBED AN ANTIBIOTIC BUT UNSURE IF SHE NEEDS PAXLOVID ALSO. PLEASE CALL ASAP

## 2022-09-08 RX ORDER — DULAGLUTIDE 3 MG/.5ML
INJECTION, SOLUTION SUBCUTANEOUS
Qty: 6 ML | Refills: 3 | Status: SHIPPED | OUTPATIENT
Start: 2022-09-08 | End: 2023-01-31 | Stop reason: CLARIF

## 2022-10-03 RX ORDER — AMLODIPINE BESYLATE 5 MG/1
5 TABLET ORAL DAILY
Qty: 90 TABLET | Refills: 1 | OUTPATIENT
Start: 2022-10-03

## 2022-10-17 ENCOUNTER — TELEPHONE (OUTPATIENT)
Dept: FAMILY MEDICINE CLINIC | Facility: CLINIC | Age: 67
End: 2022-10-17

## 2022-10-17 NOTE — TELEPHONE ENCOUNTER
Caller: Izabel Abreu    Relationship: Self    Best call back number: [phone]    Who is your current provider: RANDY GRIMES     Who would you like your new provider to be: ELLIS CARDONA    What are your reasons for transferring care: THE PATIENT STATES THAT SHE WOULD PREFER TO SEE A DO RATHER THAN A PA-C.  THE PATIENT ALSO STATES THAT OTHER REASON THAT SHE WANTS TO CHANGE IS DUE TO THE CARE SHE RECEIVED WHEN SHE HAD COVID-19    Additional notes: THE PATIENT WOULD LIKE TO MAKE AN APPOINTMENT TO SEE ELLIS HAN ON  10/21/2022 SOMETIME AROUND MID MORNING IF POSSIBLE

## 2022-10-21 ENCOUNTER — OFFICE VISIT (OUTPATIENT)
Dept: FAMILY MEDICINE CLINIC | Facility: CLINIC | Age: 67
End: 2022-10-21

## 2022-10-21 VITALS
HEART RATE: 60 BPM | HEIGHT: 62 IN | BODY MASS INDEX: 34.06 KG/M2 | TEMPERATURE: 97.1 F | SYSTOLIC BLOOD PRESSURE: 142 MMHG | OXYGEN SATURATION: 98 % | DIASTOLIC BLOOD PRESSURE: 86 MMHG | WEIGHT: 185.1 LBS

## 2022-10-21 DIAGNOSIS — M17.0 PRIMARY OSTEOARTHRITIS OF BOTH KNEES: ICD-10-CM

## 2022-10-21 DIAGNOSIS — F41.1 GAD (GENERALIZED ANXIETY DISORDER): Primary | ICD-10-CM

## 2022-10-21 DIAGNOSIS — E66.09 CLASS 1 OBESITY DUE TO EXCESS CALORIES WITH SERIOUS COMORBIDITY AND BODY MASS INDEX (BMI) OF 33.0 TO 33.9 IN ADULT: ICD-10-CM

## 2022-10-21 PROBLEM — R32 INCONTINENCE: Status: ACTIVE | Noted: 2018-10-01

## 2022-10-21 PROBLEM — I10 ESSENTIAL HYPERTENSION: Status: ACTIVE | Noted: 2018-07-12

## 2022-10-21 PROBLEM — E11.9 DIABETES MELLITUS TYPE II, CONTROLLED: Status: ACTIVE | Noted: 2019-09-03

## 2022-10-21 PROBLEM — E66.811 CLASS 1 OBESITY DUE TO EXCESS CALORIES WITH SERIOUS COMORBIDITY AND BODY MASS INDEX (BMI) OF 33.0 TO 33.9 IN ADULT: Status: ACTIVE | Noted: 2022-10-21

## 2022-10-21 PROBLEM — E21.5 PARATHYROID DISORDER: Status: ACTIVE | Noted: 2019-09-03

## 2022-10-21 PROBLEM — M25.562 PAIN IN LEFT KNEE: Status: ACTIVE | Noted: 2017-07-03

## 2022-10-21 PROCEDURE — 99214 OFFICE O/P EST MOD 30 MIN: CPT | Performed by: FAMILY MEDICINE

## 2022-10-21 RX ORDER — HYDROXYZINE HYDROCHLORIDE 25 MG/1
25 TABLET, FILM COATED ORAL DAILY
COMMUNITY
Start: 2022-09-07 | End: 2022-11-30

## 2022-10-21 NOTE — ASSESSMENT & PLAN NOTE
Psychological condition is worsening.  Start Zoloft 50 mg daily.  Side effects discussed  Psychological condition  will be reassessed in 4 weeks.

## 2022-10-21 NOTE — ASSESSMENT & PLAN NOTE
I did encourage her to proceed with steroid joint injection as this will not significantly elevate her sugars since the steroid is injected into the joint capsule.   Patent

## 2022-10-21 NOTE — ASSESSMENT & PLAN NOTE
Patient's (Body mass index is 33.86 kg/m².) indicates that they are obese (BMI >30) with health conditions that include hypertension, diabetes mellitus and dyslipidemias . Weight is unchanged. BMI is is above average; BMI management plan is completed. We discussed portion control and increasing exercise.

## 2022-10-21 NOTE — PROGRESS NOTES
Date: 10/21/2022   Patient Name: Izabel Abreu  : 1955   MRN: [id number]     Chief Complaint:    Chief Complaint   Patient presents with   • Knee Pain     Bilateral knee pain    • Anxiety       History of Present Illness: Izabel Abreu is a 66 y.o. female who is here today for Osteoarthritis and anxiety.  She has known severe osteoarthritis of her bilateral knees for which she follows with [facility] orthopedics.  They have recommended either steroid joint injections or knee replacement.  She is afraid that the steroid joint injections will significantly increase her blood sugar so she has been hesitant to agree to this.  She is trying to avoid surgery at this time.  She has significant difficulty going up or down stairs or walking long distances secondary to joint pain and swelling.    She also reports significant anxiety related to her home environment and difficulty with her .  She reports significant worry and obsession over small things as well as irritability. she has been on medication in the past for anxiety but is unsure what the medication was.  She is interested in discussing options for a daily medication.           Review of Systems:   Review of Systems   Constitutional: Negative for chills, fatigue and fever.   Respiratory: Negative for cough and shortness of breath.    Cardiovascular: Negative for chest pain and palpitations.   Gastrointestinal: Negative for abdominal pain, constipation, diarrhea, nausea and vomiting.   Musculoskeletal: Positive for arthralgias, gait problem, joint swelling and bursitis. Negative for back pain and myalgias.   Neurological: Negative for dizziness and headache.   Psychiatric/Behavioral: Positive for agitation and stress. Negative for depressed mood. The patient is nervous/anxious.        Past Medical History:   Past Medical History:   Diagnosis Date   • Asthma    • Diabetes mellitus (HCC)    • Elevated cholesterol    • Flatulence     • Heart murmur    • Hypercalcemia    • Hyperglycemia    • Hyperparathyroidism (HCC)    • Hypertension    • Sleep apnea     CPAP 7/14CM   • Subclinical hyperthyroidism    • Type 2 diabetes mellitus (HCC)    • Vitamin D deficiency        Past Surgical History:   Past Surgical History:   Procedure Laterality Date   •  SECTION     • COLONOSCOPY      SCHEDULED FOR    • D & C AND LAPAROSCOPY     • OVARY SURGERY      and tubes   • PARATHYROIDECTOMY N/A 2021    Procedure: PARATHYROIDECTOMY;  Surgeon: Apolinar Suh MD;  Location: [facility];  Service: General;  Laterality: N/A;   • SHOULDER SURGERY      shoulder joint surgery-Abstracted from [place]    • SINUS SURGERY     • TOE SURGERY     • TUBAL ABDOMINAL LIGATION Bilateral        Family History:   Family History   Problem Relation Age of Onset   • Diabetes Mother    • Hypertension Mother    • Diabetes Father    • Hypertension Father    • Hypertension Sister    • Thyroid disease Sister        Social History:   Social History     Socioeconomic History   • Marital status:    Tobacco Use   • Smoking status: Never   • Smokeless tobacco: Never   Vaping Use   • Vaping Use: Never used   Substance and Sexual Activity   • Alcohol use: Not Currently     Comment: rarely   • Drug use: Not Currently   • Sexual activity: Yes     Partners: Male     Birth control/protection: Post-menopausal, Tubal ligation, Bilateral salpingectomy        Medications:     Current Outpatient Medications:   •  acetaminophen (TYLENOL) 650 MG 8 hr tablet, Take 1,300 mg by mouth Every 8 (Eight) Hours As Needed for Mild Pain ., Disp: , Rfl:   •  Alpha-D-Galactosidase (Beano) tablet, Take 2 doses by mouth 3 (Three) Times a Day As Needed (FLATULENCE)., Disp: , Rfl:   •  amLODIPine (NORVASC) 5 MG tablet, Take 1 tablet by mouth Daily., Disp: 90 tablet, Rfl: 1  •  bisoprolol (ZEBeta) 10 MG tablet, TAKE 1 TABLET BY MOUTH DAILY, Disp: 90 tablet, Rfl: 1  •  clobetasol (TEMOVATE) 0.05 %  ointment, Apply 1 application topically to the appropriate area as directed 3 (Three) Times a Day As Needed. As needed, Disp: , Rfl:   •  estradiol (ESTRACE) 0.1 MG/GM vaginal cream, Insert 1 g into the vagina 3 (Three) Times a Week if Needed (VAGINAL ATROPHY)., Disp: , Rfl:   •  fenofibrate micronized (LOFIBRA) 134 MG capsule, Take 1 capsule by mouth Daily for 90 days., Disp: 90 capsule, Rfl: 3  •  fluticasone (FLONASE) 50 MCG/ACT nasal spray, 2 sprays into the nostril(s) as directed by provider Daily As Needed for Rhinitis or Allergies., Disp: , Rfl:   •  glucose blood (OneTouch Verio) test strip, TEST DAILY, Disp: 100 each, Rfl: 11  •  hydrOXYzine (ATARAX) 25 MG tablet, Take 1 tablet by mouth Daily., Disp: , Rfl:   •  losartan (COZAAR) 100 MG tablet, TAKE 1 TABLET BY MOUTH DAILY, Disp: 90 tablet, Rfl: 1  •  metFORMIN (GLUCOPHAGE) 1000 MG tablet, Take 0.5 tablets by mouth 2 (Two) Times a Day With Meals. 1/2 tablet bid, Disp: 180 tablet, Rfl: 3  •  nabumetone (Relafen) 750 MG tablet, Take 1 tab po Bid for pain with food, Disp: 60 tablet, Rfl: 5  •  olopatadine (PATADAY) 0.2 % solution ophthalmic solution, Administer 1 drop to both eyes Daily., Disp: , Rfl:   •  polyethyl glycol-propyl glycol (SYSTANE) 0.4-0.3 % solution ophthalmic solution, Administer 1 drop to both eyes Every 1 (One) Hour As Needed (DRY EYES)., Disp: , Rfl:   •  simvastatin (ZOCOR) 40 MG tablet, Take 1 tablet by mouth Daily., Disp: 90 tablet, Rfl: 1  •  Trulicity 3 MG/0.5ML solution pen-injector, INJECT 3MG(0.5ML) UNDER THE SKIN INTO THE APPROPIATE AREA AS DIRECTED ONE TIME PER WEEK, Disp: 6 mL, Rfl: 3  •  vitamin B-12 (CYANOCOBALAMIN) 1000 MCG tablet, Take 1,000 mcg by mouth Daily., Disp: , Rfl:   •  sertraline (Zoloft) 50 MG tablet, Take 1 tablet by mouth Daily., Disp: 30 tablet, Rfl: 5    Allergies:   Allergies   Allergen Reactions   • Diflucan [Fluconazole] Headache   • Azithromycin GI Intolerance   • Codeine Itching   •  "Hydrocodone-Acetaminophen Hives   • Latex Itching   • Penicillins Hives   • Quinapril Other (See Comments)     cough         Physical Exam:  Vital Signs:   Vitals:    10/21/22 1406   BP: 142/86   BP Location: Left arm   Patient Position: Sitting   Cuff Size: Adult   Pulse: 60   Temp: 97.1 °F (36.2 °C)   TempSrc: Temporal   SpO2: 98%   Weight: 84 kg (185 lb 1.6 oz)   Height: 157.5 cm (62\")     Body mass index is 33.86 kg/m².     Physical Exam  Vitals and nursing note reviewed.   Constitutional:       Appearance: Normal appearance.   Cardiovascular:      Rate and Rhythm: Normal rate and regular rhythm.      Heart sounds: Normal heart sounds. No murmur heard.  Pulmonary:      Effort: Pulmonary effort is normal.      Breath sounds: Normal breath sounds. No wheezing.   Neurological:      Mental Status: She is alert and oriented to person, place, and time. Mental status is at baseline.   Psychiatric:         Mood and Affect: Mood normal.         Behavior: Behavior normal.           Assessment/Plan:   Diagnoses and all orders for this visit:    1. EKLECHI (generalized anxiety disorder) (Primary)  Assessment & Plan:  Psychological condition is worsening.  Start Zoloft 50 mg daily.  Side effects discussed  Psychological condition  will be reassessed in 4 weeks.    Orders:  -     sertraline (Zoloft) 50 MG tablet; Take 1 tablet by mouth Daily.  Dispense: 30 tablet; Refill: 5    2. Primary osteoarthritis of both knees  Assessment & Plan:  I did encourage her to proceed with steroid joint injection as this will not significantly elevate her sugars since the steroid is injected into the joint capsule.      3. Class 1 obesity due to excess calories with serious comorbidity and body mass index (BMI) of 33.0 to 33.9 in adult  Assessment & Plan:  Patient's (Body mass index is 33.86 kg/m².) indicates that they are obese (BMI >30) with health conditions that include hypertension, diabetes mellitus and dyslipidemias . Weight is unchanged. " BMI is is above average; BMI management plan is completed. We discussed portion control and increasing exercise.            Follow Up:   Return in about 1 month (around 11/21/2022) for Med Recheck.    Destiny Eid DO  [facility]

## 2022-11-30 ENCOUNTER — OFFICE VISIT (OUTPATIENT)
Dept: FAMILY MEDICINE CLINIC | Facility: CLINIC | Age: 67
End: 2022-11-30

## 2022-11-30 VITALS
DIASTOLIC BLOOD PRESSURE: 84 MMHG | WEIGHT: 184.6 LBS | HEART RATE: 88 BPM | BODY MASS INDEX: 33.97 KG/M2 | HEIGHT: 62 IN | OXYGEN SATURATION: 98 % | SYSTOLIC BLOOD PRESSURE: 136 MMHG | TEMPERATURE: 96.9 F

## 2022-11-30 DIAGNOSIS — L90.0 LICHEN SCLEROSUS: ICD-10-CM

## 2022-11-30 DIAGNOSIS — F41.1 GAD (GENERALIZED ANXIETY DISORDER): Primary | ICD-10-CM

## 2022-11-30 DIAGNOSIS — Z23 FLU VACCINE NEED: ICD-10-CM

## 2022-11-30 PROCEDURE — 99214 OFFICE O/P EST MOD 30 MIN: CPT | Performed by: FAMILY MEDICINE

## 2022-11-30 PROCEDURE — 90471 IMMUNIZATION ADMIN: CPT | Performed by: FAMILY MEDICINE

## 2022-11-30 PROCEDURE — 90662 IIV NO PRSV INCREASED AG IM: CPT | Performed by: FAMILY MEDICINE

## 2022-11-30 RX ORDER — DOXYCYCLINE HYCLATE 100 MG/1
100 CAPSULE ORAL 2 TIMES DAILY
COMMUNITY
Start: 2022-11-15 | End: 2022-11-30

## 2022-11-30 NOTE — PROGRESS NOTES
Date: 2022   Patient Name: Izabel Abreu  : 1955   MRN: [id number]     Chief Complaint:    Chief Complaint   Patient presents with   • Anxiety     Follow up on Zoloft, patient reports improvement        History of Present Illness: Izabel Abreu is a 67 y.o. female who is here today to follow up for Anxiety.  She was prescribed Zoloft 50 mg daily at the last appointment and reports significant improvement in anxiety symptoms.  She denies side effects to the medication..    Patient also has a history of lichen sclerosis and has been struggling with vaginal itching recently.  She is supposed to use clobetasol 3 times weekly but has not been regularly applying this externally.  She also reports vaginal dryness and some pain with intercourse at times.           Review of Systems:   Review of Systems   Constitutional: Negative for chills, fatigue and fever.   Respiratory: Negative for cough and shortness of breath.    Cardiovascular: Negative for chest pain and palpitations.   Gastrointestinal: Negative for abdominal pain, constipation, diarrhea, nausea and vomiting.   Genitourinary:        Vaginal itching, vaginal dryness   Musculoskeletal: Negative for back pain and myalgias.   Neurological: Negative for dizziness, weakness and headache.   Psychiatric/Behavioral: Positive for stress. Negative for depressed mood. The patient is not nervous/anxious.        Past Medical History:   Past Medical History:   Diagnosis Date   • Asthma    • Diabetes mellitus (HCC)    • Elevated cholesterol    • Flatulence    • Heart murmur    • Hypercalcemia    • Hyperglycemia    • Hyperparathyroidism (HCC)    • Hypertension    • Sleep apnea     CPAP 7/14CM   • Subclinical hyperthyroidism    • Type 2 diabetes mellitus (HCC)    • Vitamin D deficiency        Past Surgical History:   Past Surgical History:   Procedure Laterality Date   •  SECTION     • COLONOSCOPY      SCHEDULED FOR    • D & C AND  LAPAROSCOPY     • OVARY SURGERY      and tubes   • PARATHYROIDECTOMY N/A 7/16/2021    Procedure: PARATHYROIDECTOMY;  Surgeon: Apolinar Suh MD;  Location: [facility];  Service: General;  Laterality: N/A;   • SHOULDER SURGERY      shoulder joint surgery-Abstracted from [facility]    • SINUS SURGERY     • TOE SURGERY     • TUBAL ABDOMINAL LIGATION Bilateral        Family History:   Family History   Problem Relation Age of Onset   • Diabetes Mother    • Hypertension Mother    • Diabetes Father    • Hypertension Father    • Hypertension Sister    • Thyroid disease Sister        Social History:   Social History     Socioeconomic History   • Marital status:    Tobacco Use   • Smoking status: Never   • Smokeless tobacco: Never   Vaping Use   • Vaping Use: Never used   Substance and Sexual Activity   • Alcohol use: Not Currently     Comment: rarely   • Drug use: Not Currently   • Sexual activity: Yes     Partners: Male     Birth control/protection: Post-menopausal, Tubal ligation, Bilateral salpingectomy        Medications:     Current Outpatient Medications:   •  acetaminophen (TYLENOL) 650 MG 8 hr tablet, Take 1,300 mg by mouth Every 8 (Eight) Hours As Needed for Mild Pain ., Disp: , Rfl:   •  Alpha-D-Galactosidase (Beano) tablet, Take 2 doses by mouth 3 (Three) Times a Day As Needed (FLATULENCE)., Disp: , Rfl:   •  amLODIPine (NORVASC) 5 MG tablet, Take 1 tablet by mouth Daily., Disp: 90 tablet, Rfl: 1  •  bisoprolol (ZEBeta) 10 MG tablet, TAKE 1 TABLET BY MOUTH DAILY, Disp: 90 tablet, Rfl: 1  •  clobetasol (TEMOVATE) 0.05 % ointment, Apply 1 application topically to the appropriate area as directed 3 (Three) Times a Day As Needed. As needed, Disp: , Rfl:   •  estradiol (ESTRACE) 0.1 MG/GM vaginal cream, Insert 1 g into the vagina 3 (Three) Times a Week if Needed (VAGINAL ATROPHY)., Disp: , Rfl:   •  fenofibrate micronized (LOFIBRA) 134 MG capsule, Take 1 capsule by mouth Daily for 90 days., Disp: 90 capsule,  "Rfl: 3  •  fluticasone (FLONASE) 50 MCG/ACT nasal spray, 2 sprays into the nostril(s) as directed by provider Daily As Needed for Rhinitis or Allergies., Disp: , Rfl:   •  glucose blood (OneTouch Verio) test strip, TEST DAILY, Disp: 100 each, Rfl: 11  •  losartan (COZAAR) 100 MG tablet, TAKE 1 TABLET BY MOUTH DAILY, Disp: 90 tablet, Rfl: 1  •  metFORMIN (GLUCOPHAGE) 1000 MG tablet, Take 0.5 tablets by mouth 2 (Two) Times a Day With Meals. 1/2 tablet bid, Disp: 180 tablet, Rfl: 3  •  olopatadine (PATADAY) 0.2 % solution ophthalmic solution, Administer 1 drop to both eyes Daily., Disp: , Rfl:   •  polyethyl glycol-propyl glycol (SYSTANE) 0.4-0.3 % solution ophthalmic solution, Administer 1 drop to both eyes Every 1 (One) Hour As Needed (DRY EYES)., Disp: , Rfl:   •  sertraline (Zoloft) 50 MG tablet, Take 1 tablet by mouth Daily., Disp: 90 tablet, Rfl: 1  •  simvastatin (ZOCOR) 40 MG tablet, Take 1 tablet by mouth Daily., Disp: 90 tablet, Rfl: 1  •  Trulicity 3 MG/0.5ML solution pen-injector, INJECT 3MG(0.5ML) UNDER THE SKIN INTO THE APPROPIATE AREA AS DIRECTED ONE TIME PER WEEK, Disp: 6 mL, Rfl: 3  •  vitamin B-12 (CYANOCOBALAMIN) 1000 MCG tablet, Take 1,000 mcg by mouth Daily., Disp: , Rfl:     Allergies:   Allergies   Allergen Reactions   • Diflucan [Fluconazole] Headache   • Azithromycin GI Intolerance   • Codeine Itching   • Hydrocodone-Acetaminophen Hives   • Latex Itching   • Penicillins Hives   • Quinapril Other (See Comments)     cough       PHQ-2 Total Score:     PHQ-9 Total Score:       Physical Exam:  Vital Signs:   Vitals:    11/30/22 0939   BP: 136/84   BP Location: Left arm   Patient Position: Sitting   Cuff Size: Adult   Pulse: 88   Temp: 96.9 °F (36.1 °C)   TempSrc: Temporal   SpO2: 98%   Weight: 83.7 kg (184 lb 9.6 oz)   Height: 157.5 cm (62\")     Body mass index is 33.76 kg/m².     Physical Exam  Vitals and nursing note reviewed.   Constitutional:       Appearance: Normal appearance. "   Cardiovascular:      Rate and Rhythm: Normal rate and regular rhythm.      Heart sounds: Normal heart sounds. No murmur heard.  Pulmonary:      Effort: Pulmonary effort is normal.      Breath sounds: Normal breath sounds. No wheezing.   Neurological:      Mental Status: She is alert and oriented to person, place, and time. Mental status is at baseline.   Psychiatric:         Mood and Affect: Mood normal.         Behavior: Behavior normal.           Assessment/Plan:   Diagnoses and all orders for this visit:    1. KELECHI (generalized anxiety disorder) (Primary)  Assessment & Plan:  Psychological condition is improving with treatment.  Continue current treatment regimen.  Psychological condition  will be reassessed at the next regular appointment.    Orders:  -     sertraline (Zoloft) 50 MG tablet; Take 1 tablet by mouth Daily.  Dispense: 90 tablet; Refill: 1    2. Lichen sclerosus  Assessment & Plan:  Patient was instructed to apply clobetasol externally to the vaginal area every day for 1 week and then go back to 3 times weekly usage.  She will let me know if symptoms are persistent or worsening      3. Flu vaccine need  -     Fluzone High-Dose 65+yrs         Follow Up:   Return in about 6 months (around 5/30/2023) for Annual physical.    Destiny Eid DO  [facility]

## 2022-11-30 NOTE — ASSESSMENT & PLAN NOTE
Patient was instructed to apply clobetasol externally to the vaginal area every day for 1 week and then go back to 3 times weekly usage.  She will let me know if symptoms are persistent or worsening

## 2022-12-06 ENCOUNTER — TELEPHONE (OUTPATIENT)
Dept: ENDOCRINOLOGY | Facility: CLINIC | Age: 67
End: 2022-12-06

## 2022-12-06 RX ORDER — LIRAGLUTIDE 6 MG/ML
1.8 INJECTION SUBCUTANEOUS DAILY
Qty: 9 ML | Refills: 2 | Status: SHIPPED | OUTPATIENT
Start: 2022-12-06 | End: 2023-01-31

## 2022-12-06 NOTE — TELEPHONE ENCOUNTER
Pt called and her pharmacy is out of [facility]. She has been trying to locate it at another pharmacy. Ask dr vee if he would like to call in comparable drug.    Call pt:  [phone]      Pt uses [facility]:  Phone: [phone] Fax: [phone]

## 2022-12-09 RX ORDER — SERTRALINE HYDROCHLORIDE 100 MG/1
100 TABLET, FILM COATED ORAL DAILY
Qty: 90 TABLET | Refills: 1 | Status: SHIPPED | OUTPATIENT
Start: 2022-12-09 | End: 2023-03-13

## 2022-12-12 ENCOUNTER — TELEPHONE (OUTPATIENT)
Dept: ENDOCRINOLOGY | Facility: CLINIC | Age: 67
End: 2022-12-12

## 2022-12-12 NOTE — TELEPHONE ENCOUNTER
Spoke with patient.  She wanted to know if pen would go back to zero once she gives injection.  Per Dr. Garcia, yes.  Patient verbalized understanding.

## 2023-01-20 ENCOUNTER — TELEPHONE (OUTPATIENT)
Dept: FAMILY MEDICINE CLINIC | Facility: CLINIC | Age: 68
End: 2023-01-20

## 2023-01-20 NOTE — TELEPHONE ENCOUNTER
Caller: Izabel Abreu    Relationship to patient: Self    Best call back number: [phone]    Chief complaint: FELL AND HIT LEFT SIDE, BRUISED AND PAINFUL    Type of visit: OV    Requested date: ASAP    If rescheduling, when is the original appointment: N/A    Additional notes:PATIENT STATES THAT SHE HAD FELL LAST WEEK AND NEEDS AN XRAY AND PAIN MEDICATION

## 2023-01-27 ENCOUNTER — OFFICE (OUTPATIENT)
Dept: URBAN - METROPOLITAN AREA PATHOLOGY 4 | Facility: PATHOLOGY | Age: 68
End: 2023-01-27

## 2023-01-27 ENCOUNTER — AMBULATORY SURGICAL CENTER (OUTPATIENT)
Dept: URBAN - METROPOLITAN AREA SURGERY 10 | Facility: SURGERY | Age: 68
End: 2023-01-27

## 2023-01-27 DIAGNOSIS — D12.2 BENIGN NEOPLASM OF ASCENDING COLON: ICD-10-CM

## 2023-01-27 DIAGNOSIS — K64.1 SECOND DEGREE HEMORRHOIDS: ICD-10-CM

## 2023-01-27 DIAGNOSIS — Z12.11 ENCOUNTER FOR SCREENING FOR MALIGNANT NEOPLASM OF COLON: ICD-10-CM

## 2023-01-27 DIAGNOSIS — D12.0 BENIGN NEOPLASM OF CECUM: ICD-10-CM

## 2023-01-27 DIAGNOSIS — D12.8 BENIGN NEOPLASM OF RECTUM: ICD-10-CM

## 2023-01-27 DIAGNOSIS — Z86.010 PERSONAL HISTORY OF COLONIC POLYPS: ICD-10-CM

## 2023-01-27 DIAGNOSIS — K57.30 DIVERTICULOSIS OF LARGE INTESTINE WITHOUT PERFORATION OR ABS: ICD-10-CM

## 2023-01-27 PROCEDURE — 88305 TISSUE EXAM BY PATHOLOGIST: CPT | Performed by: INTERNAL MEDICINE

## 2023-01-27 PROCEDURE — 45385 COLONOSCOPY W/LESION REMOVAL: CPT | Mod: 33 | Performed by: INTERNAL MEDICINE

## 2023-01-30 PROBLEM — Z12.11 ENCOUNTER FOR COLONOSCOPY DUE TO HISTORY OF ADENOMATOUS COLONIC POLYPS: Status: ACTIVE | Noted: 2023-01-30

## 2023-01-31 RX ORDER — LIRAGLUTIDE 6 MG/ML
INJECTION SUBCUTANEOUS
Qty: 9 ML | Refills: 0 | Status: SHIPPED | OUTPATIENT
Start: 2023-01-31 | End: 2023-02-10 | Stop reason: SDUPTHER

## 2023-02-10 ENCOUNTER — OFFICE VISIT (OUTPATIENT)
Dept: ENDOCRINOLOGY | Facility: CLINIC | Age: 68
End: 2023-02-10
Payer: COMMERCIAL

## 2023-02-10 VITALS
DIASTOLIC BLOOD PRESSURE: 79 MMHG | SYSTOLIC BLOOD PRESSURE: 120 MMHG | HEART RATE: 70 BPM | BODY MASS INDEX: 32.39 KG/M2 | HEIGHT: 62 IN | WEIGHT: 176 LBS | OXYGEN SATURATION: 98 %

## 2023-02-10 DIAGNOSIS — E11.65 UNCONTROLLED TYPE 2 DIABETES MELLITUS WITH HYPERGLYCEMIA: Primary | ICD-10-CM

## 2023-02-10 LAB
ALBUMIN SERPL-MCNC: 4.5 G/DL (ref 3.5–5.2)
ALBUMIN UR-MCNC: <1.2 MG/DL
ALBUMIN/GLOB SERPL: 1.8 G/DL
ALP SERPL-CCNC: 54 U/L (ref 39–117)
ALT SERPL W P-5'-P-CCNC: 22 U/L (ref 1–33)
ANION GAP SERPL CALCULATED.3IONS-SCNC: 9.5 MMOL/L (ref 5–15)
AST SERPL-CCNC: 20 U/L (ref 1–32)
BILIRUB SERPL-MCNC: 0.3 MG/DL (ref 0–1.2)
BUN SERPL-MCNC: 19 MG/DL (ref 8–23)
BUN/CREAT SERPL: 21.8 (ref 7–25)
CALCIUM SPEC-SCNC: 10.2 MG/DL (ref 8.6–10.5)
CHLORIDE SERPL-SCNC: 102 MMOL/L (ref 98–107)
CHOLEST SERPL-MCNC: 138 MG/DL (ref 0–200)
CO2 SERPL-SCNC: 26.5 MMOL/L (ref 22–29)
CREAT SERPL-MCNC: 0.87 MG/DL (ref 0.57–1)
CREAT UR-MCNC: 77 MG/DL
EGFRCR SERPLBLD CKD-EPI 2021: 73.6 ML/MIN/1.73
EXPIRATION DATE: ABNORMAL
EXPIRATION DATE: NORMAL
GLOBULIN UR ELPH-MCNC: 2.5 GM/DL
GLUCOSE BLDC GLUCOMTR-MCNC: 159 MG/DL (ref 70–130)
GLUCOSE SERPL-MCNC: 147 MG/DL (ref 65–99)
HBA1C MFR BLD: 7.6 %
HDLC SERPL-MCNC: 48 MG/DL (ref 40–60)
LDLC SERPL CALC-MCNC: 72 MG/DL (ref 0–100)
LDLC/HDLC SERPL: 1.48 {RATIO}
Lab: ABNORMAL
Lab: NORMAL
MICROALBUMIN/CREAT UR: NORMAL MG/G{CREAT}
POTASSIUM SERPL-SCNC: 4.6 MMOL/L (ref 3.5–5.2)
PROT SERPL-MCNC: 7 G/DL (ref 6–8.5)
SODIUM SERPL-SCNC: 138 MMOL/L (ref 136–145)
TRIGL SERPL-MCNC: 94 MG/DL (ref 0–150)
TSH SERPL DL<=0.05 MIU/L-ACNC: 0.5 UIU/ML (ref 0.27–4.2)
VLDLC SERPL-MCNC: 18 MG/DL (ref 5–40)

## 2023-02-10 PROCEDURE — 82947 ASSAY GLUCOSE BLOOD QUANT: CPT | Performed by: INTERNAL MEDICINE

## 2023-02-10 PROCEDURE — 82043 UR ALBUMIN QUANTITATIVE: CPT | Performed by: INTERNAL MEDICINE

## 2023-02-10 PROCEDURE — 83036 HEMOGLOBIN GLYCOSYLATED A1C: CPT | Performed by: INTERNAL MEDICINE

## 2023-02-10 PROCEDURE — 84443 ASSAY THYROID STIM HORMONE: CPT | Performed by: INTERNAL MEDICINE

## 2023-02-10 PROCEDURE — 82570 ASSAY OF URINE CREATININE: CPT | Performed by: INTERNAL MEDICINE

## 2023-02-10 PROCEDURE — 99213 OFFICE O/P EST LOW 20 MIN: CPT | Performed by: INTERNAL MEDICINE

## 2023-02-10 PROCEDURE — 80061 LIPID PANEL: CPT | Performed by: INTERNAL MEDICINE

## 2023-02-10 PROCEDURE — 80053 COMPREHEN METABOLIC PANEL: CPT | Performed by: INTERNAL MEDICINE

## 2023-02-10 RX ORDER — AMLODIPINE BESYLATE 5 MG/1
5 TABLET ORAL DAILY
Qty: 90 TABLET | Refills: 1 | Status: SHIPPED | OUTPATIENT
Start: 2023-02-10 | End: 2023-03-28

## 2023-02-10 RX ORDER — PEN NEEDLE, DIABETIC 32GX 5/32"
NEEDLE, DISPOSABLE MISCELLANEOUS
Qty: 100 EACH | Refills: 3 | Status: SHIPPED | OUTPATIENT
Start: 2023-02-10

## 2023-02-10 RX ORDER — LIRAGLUTIDE 6 MG/ML
INJECTION SUBCUTANEOUS
Qty: 9 ML | Refills: 5 | Status: SHIPPED | OUTPATIENT
Start: 2023-02-10

## 2023-02-10 RX ORDER — BISOPROLOL FUMARATE 10 MG/1
10 TABLET, FILM COATED ORAL DAILY
Qty: 90 TABLET | Refills: 1 | Status: SHIPPED | OUTPATIENT
Start: 2023-02-10

## 2023-02-10 RX ORDER — FENOFIBRATE 134 MG/1
134 CAPSULE ORAL DAILY
Qty: 90 CAPSULE | Refills: 1 | Status: SHIPPED | OUTPATIENT
Start: 2023-02-10

## 2023-02-10 NOTE — ASSESSMENT & PLAN NOTE
a1c stable in the mid 7 range.   No ideal but acceptable  Any medication I have  To offer would increase the risk of hypoglycemia so I am ok with where we are.

## 2023-02-10 NOTE — TELEPHONE ENCOUNTER
PATIENT STATED AT CHECK OUT THAT SHE FORGOT TO MENTION THAT SHE NEEDED REFILLS DURING HER APPT. PATIENT ALSO NEEDS PEN NEEDLES. FOR VICTOZA. PLEASE SEND TO [facility].

## 2023-02-10 NOTE — PROGRESS NOTES
"     Office Note      Date: 02/10/2023  Patient Name: Izabel Abreu  MRN: [id number]  : 1955    Chief Complaint   Patient presents with   • Diabetes       History of Present Illness:   Izabel Abreu is a 67 y.o. female who presents for Diabetes type 2.   Current RX metformin and victoza     Bg checks are done:daily   Hypoglycemia : every other day       Last A1c:  Hemoglobin A1C   Date Value Ref Range Status   02/10/2023 7.6 % Final       Changes in health since last visit: dexascan showed osteopenia.. Last eye exam up to date .    Subjective            Review of Systems:   Review of Systems   Constitutional: Negative.    HENT: Negative.    Eyes: Negative.    Respiratory: Negative.        The following portions of the patient's history were reviewed and updated as appropriate: allergies, current medications, past family history, past medical history, past social history, past surgical history and problem list.    Objective     Visit Vitals  /79   Pulse 70   Ht 157.5 cm (62\")   Wt 79.8 kg (176 lb)   SpO2 98%   BMI 32.19 kg/m²           Physical Exam:  Physical Exam  Vitals reviewed.   Constitutional:       Appearance: Normal appearance. She is normal weight.   Cardiovascular:      Pulses:           Dorsalis pedis pulses are 2+ on the right side and 2+ on the left side.        Posterior tibial pulses are 2+ on the right side and 2+ on the left side.   Musculoskeletal:      Right foot: Normal range of motion. No deformity, bunion, Charcot foot, foot drop or prominent metatarsal heads.      Left foot: Normal range of motion. No deformity, bunion, Charcot foot, foot drop or prominent metatarsal heads.   Feet:      Right foot:      Protective Sensation: 10 sites tested. 10 sites sensed.      Skin integrity: Skin integrity normal.      Toenail Condition: Right toenails are normal.      Left foot:      Protective Sensation: 10 sites tested. 10 sites sensed.      Skin integrity: Skin integrity normal. "      Toenail Condition: Left toenails are normal.      Comments: Diabetic Foot Exam Performed and Monofilament Test Performed    Neurological:      Mental Status: She is alert.   Psychiatric:         Mood and Affect: Mood normal.         Behavior: Behavior normal.         Thought Content: Thought content normal.         Judgment: Judgment normal.          Assessment / Plan      Assessment & Plan:  Problem List Items Addressed This Visit        Other    Uncontrolled type 2 diabetes mellitus with hyperglycemia (HCC) - Primary    Overview     Intolerant of saad  Intolerant on sglt2          Current Assessment & Plan     a1c stable in the mid 7 range.   No ideal but acceptable  Any medication I have  To offer would increase the risk of hypoglycemia so I am ok with where we are.          Relevant Medications    metFORMIN (GLUCOPHAGE) 1000 MG tablet    Victoza 18 MG/3ML solution pen-injector injection    Other Relevant Orders    POC Glucose, Blood (Completed)    POC Glycosylated Hemoglobin (Hb A1C) (Completed)    Comprehensive Metabolic Panel    Lipid Panel    Microalbumin / Creatinine Urine Ratio - Urine, Clean Catch    TSH    Ambulatory Referral to Diabetic Education        Dez Sánchez MD   02/10/2023

## 2023-02-13 ENCOUNTER — TELEHEALTH PROVIDED IN PATIENT'S HOME (OUTPATIENT)
Dept: URBAN - METROPOLITAN AREA TELEHEALTH 1 | Facility: TELEHEALTH | Age: 68
End: 2023-02-13

## 2023-02-13 VITALS — WEIGHT: 175 LBS | HEIGHT: 62 IN

## 2023-02-13 DIAGNOSIS — R19.7 DIARRHEA, UNSPECIFIED: ICD-10-CM

## 2023-02-13 DIAGNOSIS — R15.2 FECAL URGENCY: ICD-10-CM

## 2023-02-13 DIAGNOSIS — K58.2 MIXED IRRITABLE BOWEL SYNDROME: ICD-10-CM

## 2023-02-13 DIAGNOSIS — R15.9 FULL INCONTINENCE OF FECES: ICD-10-CM

## 2023-02-13 DIAGNOSIS — K59.00 CONSTIPATION, UNSPECIFIED: ICD-10-CM

## 2023-02-13 PROCEDURE — 99214 OFFICE O/P EST MOD 30 MIN: CPT | Performed by: NURSE PRACTITIONER

## 2023-02-13 RX ORDER — DICYCLOMINE HYDROCHLORIDE 10 MG/1
CAPSULE ORAL
Qty: 60 | Refills: 3 | Status: ACTIVE
Start: 2023-02-13

## 2023-02-20 ENCOUNTER — TELEPHONE (OUTPATIENT)
Dept: ENDOCRINOLOGY | Facility: CLINIC | Age: 68
End: 2023-02-20

## 2023-02-20 NOTE — TELEPHONE ENCOUNTER
PATIENT IS COMING IN TO SEE DIABETIC EDUCATORS FOR EATING HABITS TO HELP HER HEALTH ISSUES. SHE LOST THE PAPER WORK THEY SENT TO HER SHE NEEDS THE EDUCATORS TO RESEND PAPER WORK TO HER. PATIENTS NUMBER [phone]

## 2023-03-01 ENCOUNTER — OFFICE VISIT (OUTPATIENT)
Dept: DIABETES SERVICES | Facility: HOSPITAL | Age: 68
End: 2023-03-01
Payer: COMMERCIAL

## 2023-03-03 PROCEDURE — G0108 DIAB MANAGE TRN  PER INDIV: HCPCS

## 2023-03-03 NOTE — CONSULTS
Diabetes Education    Patient Name:  Izabel Abreu  YOB: 1955  MRN: [id number]  Admit Date:  (Not on file)        1hr DM education, primarily nutrition focused. Full session details will be available under Media tab. Pt with many questions regarding GI dietary needs, will set up fup with RD who can provide more detailed assistance with this. Encouraged pt to call with any questions.      Electronically signed by:  Maryanne Luevano  03/03/23 09:16 EST

## 2023-03-13 RX ORDER — SERTRALINE HYDROCHLORIDE 100 MG/1
100 TABLET, FILM COATED ORAL DAILY
Qty: 90 TABLET | Refills: 1 | Status: SHIPPED | OUTPATIENT
Start: 2023-03-13

## 2023-03-15 ENCOUNTER — TELEPHONE (OUTPATIENT)
Dept: ENDOCRINOLOGY | Facility: CLINIC | Age: 68
End: 2023-03-15
Payer: COMMERCIAL

## 2023-03-15 ENCOUNTER — TRANSCRIBE ORDERS (OUTPATIENT)
Dept: NUTRITION | Facility: HOSPITAL | Age: 68
End: 2023-03-15
Payer: COMMERCIAL

## 2023-03-15 DIAGNOSIS — K86.81 EXOCRINE PANCREATIC INSUFFICIENCY: Primary | ICD-10-CM

## 2023-03-15 NOTE — TELEPHONE ENCOUNTER
PT CALLED STATING SHE SAW [facility]HEL FOR DM EDUCATION ON 03/01/23. PT STATED MOST OF HER Q's WERE IN REGARDS TO HER DIVERTICULITIS. PT WAS TOLD BY EDUCATORS THEY DO NOT SPECIALIZE IN GI ISSUES. PT REQUESTED A REFERRAL BE SENT TO ANOTHER OFFICE FOR GI ISSUES. PT STATED SHE DID NOT GET ANY OF HER Q's ANSWERED AND REQUESTED NOT TO BE CHARGED FOR HER VISIT W/ DM EDUCATORS. SHE REQUESTED A CALL BACK FROM MICHEL.

## 2023-03-15 NOTE — TELEPHONE ENCOUNTER
Returned call, assured pt referral was made. She is to expect a call from [facility].

## 2023-03-22 ENCOUNTER — HOSPITAL ENCOUNTER (OUTPATIENT)
Dept: NUTRITION | Facility: HOSPITAL | Age: 68
Setting detail: RECURRING SERIES
Discharge: HOME | End: 2023-03-22
Payer: COMMERCIAL

## 2023-03-22 PROCEDURE — 97802 MEDICAL NUTRITION INDIV IN: CPT

## 2023-03-22 NOTE — CONSULTS
[facility]          Initial 60 Minute Nutrition Visit    Date: 2023   Patient Name: Izabel Abreu  : 1955   MRN: [id number]   Referring Provider: Dr. Fidencio Duong    Reason for Visit: nutrition counseling r/t EPI  Visit Format: telehealth, individual     Nutrition Assessment       Social History:   Social History     Socioeconomic History   • Marital status:    Tobacco Use   • Smoking status: Never     Passive exposure: Yes   • Smokeless tobacco: Never   Vaping Use   • Vaping Use: Never used   Substance and Sexual Activity   • Alcohol use: Not Currently     Comment: rarely   • Drug use: Not Currently   • Sexual activity: Yes     Partners: Male     Birth control/protection: Post-menopausal, Tubal ligation, Bilateral salpingectomy      Active Problem List:   Patient Active Problem List    Diagnosis    • Lichen sclerosus [L90.0]    • KELECHI (generalized anxiety disorder) [F41.1]    • Primary osteoarthritis of both knees [M17.0]    • Class 1 obesity due to excess calories with serious comorbidity and body mass index (BMI) of 33.0 to 33.9 in adult [E66.09, Z68.33]    • Insect bite of right lower leg [S80.861A, W57.XXXA]    • Chronic pain of right knee [M25.561, G89.29]    • Uncontrolled type 2 diabetes mellitus with hyperglycemia (HCC) [E11.65]    • BMI 32.0-32.9,adult [Z68.32]    • Parathyroid disorder (HCC) [E21.5]    • Diabetes mellitus type II, controlled (HCC) [E11.9]    • Incontinence [R32]    • Essential hypertension [I10]    • Pain in left knee [M25.562]    • Mixed hyperlipidemia [E78.2]       Current Medications:   Current Outpatient Medications:   •  metFORMIN (GLUCOPHAGE) 1000 MG tablet, TAKE 1/2 TABLET BY MOUTH TWICE DAILY WITH MEALS, Disp: 90 tablet, Rfl: 3  •  acetaminophen (TYLENOL) 650 MG 8 hr tablet, Take 1,300 mg by mouth Every 8 (Eight) Hours As Needed for Mild Pain ., Disp: , Rfl:   •  Alpha-D-Galactosidase (Beano) tablet, Take 2 doses by mouth 3  (Three) Times a Day As Needed (FLATULENCE)., Disp: , Rfl:   •  amLODIPine (NORVASC) 5 MG tablet, Take 1 tablet by mouth Daily., Disp: 90 tablet, Rfl: 1  •  bisoprolol (ZEBeta) 10 MG tablet, Take 1 tablet by mouth Daily., Disp: 90 tablet, Rfl: 1  •  clobetasol (TEMOVATE) 0.05 % ointment, Apply 1 application topically to the appropriate area as directed 3 (Three) Times a Day As Needed. As needed, Disp: , Rfl:   •  estradiol (ESTRACE) 0.1 MG/GM vaginal cream, Insert 1 g into the vagina 3 (Three) Times a Week if Needed (VAGINAL ATROPHY)., Disp: , Rfl:   •  fenofibrate micronized (LOFIBRA) 134 MG capsule, Take 1 capsule by mouth Daily., Disp: 90 capsule, Rfl: 1  •  fluticasone (FLONASE) 50 MCG/ACT nasal spray, 2 sprays into the nostril(s) as directed by provider Daily As Needed for Rhinitis or Allergies., Disp: , Rfl:   •  glucose blood (OneTouch Verio) test strip, TEST DAILY, Disp: 100 each, Rfl: 11  •  Insulin Pen Needle (BD Pen Needle Naz U/F) 32G X 4 MM misc, Use 1 tablet daily, Disp: 100 each, Rfl: 3  •  Liraglutide (Victoza) 18 MG/3ML solution pen-injector injection, Inject 1.8mg daily, Disp: 9 mL, Rfl: 5  •  losartan (COZAAR) 100 MG tablet, TAKE 1 TABLET BY MOUTH DAILY, Disp: 90 tablet, Rfl: 1  •  olopatadine (PATADAY) 0.2 % solution ophthalmic solution, Administer 1 drop to both eyes Daily., Disp: , Rfl:   •  sertraline (ZOLOFT) 100 MG tablet, TAKE 1 TABLET BY MOUTH DAILY, Disp: 90 tablet, Rfl: 1  •  simvastatin (ZOCOR) 40 MG tablet, Take 1 tablet by mouth Daily., Disp: 90 tablet, Rfl: 1  •  vitamin B-12 (CYANOCOBALAMIN) 1000 MCG tablet, Take 1,000 mcg by mouth Daily., Disp: , Rfl:     Labs: reviewed - labs indicate pancreatic insufficiency.     Food & Nutrition Related History       Food Allergies: lactose intolerance   Gastrointestinal conditions that impact intake or food choices: Pancreatic Insufficiency   Nutrition Impact Symptoms: bloating , abdominal pain, urgent bowel movements   Details of home: lives  "independently   Difficulty chewin - Normal  Difficulty swallowin - Normal  History of eating disorder/disordered eating habits: none  Language/communication details: english is proficient   Barriers to learning: No barriers identified at this time    24 Hour Recall:   Time Food/beverages consumed   breakfast Plain oatmeal with cinnamon, 2 eggs, and coffee   Snack 4 peanut butter crackers   Lunch Beef enchiladas and 1 cookie   Snack 1/2 cup cottage cheese, 1/2 cup fruit   dinner Pork loin, green beans, pineapple, cantelope   Snack reesces pieces, cheezits              Additional comments: Patient is currently following a diet to manage blood sugar - patient has been following a modified low fodmap diet.       Anthropometrics      Height:   Ht Readings from Last 1 Encounters:   02/10/23 157.5 cm (62\")     Weight:   Wt Readings from Last 3 Encounters:   02/10/23 79.8 kg (176 lb)   22 83.7 kg (184 lb 9.6 oz)   10/21/22 84 kg (185 lb 1.6 oz)     BMI: There is no height or weight on file to calculate BMI.   Weight Change: none     Physical Activity         Physical activity comments: Physical activity not discussed or assessed     Discussion / Education      During session, RD reviewed pertinent medical history with patient which includes a diagnosis of EPI, diverticulosis, and a hx of abnormal polyps. Patient's gastroenterologist suggested a low-fodmap diet and patient has been avoiding high fodmap foods but has not done a complete elimination with a challenge phase to identify potential intolerances. Patient reports frequent gas/bloating and multiple bowel movements on most days. Patient takes miralax and metamucil daily. Based on patient's symptoms and provider's request, RD determined it most appropriate to review a low-fodmap diet with patient to allow for proper elimination before guiding thought reintroduction phase. RD reviewed foods that are high in fodmaps as well as low-fodmap alternatives. ALEJANDRA " reviewed the Plate Method with patient to be used when planning balanced meals and snacks for good blood glucose control. Patient demonstrates understanding. RD discussed potential relationship between high fat foods and symptoms d/t pancreatic insufficiency - patient was advised to keep a food journal to record relationship between foods and symptoms in order to identify trends. Patient was agreeable.     Assessment of patient engagement: Engaged    Measurement of understanding: Patient verbalized understanding    Resources Provided: BH Will Fodmap Basic     Goal (s)      Goal 1: Follow a low fodmap diet for 4 weeks before progressing to reintroduction phase.      Goal 2: Consume 3 meals and 1-2 snacks daily for good blood glucose control.      Goal 3: Keep a food journal to record relationship between symptoms and intake.      Plan of Care     PES Statement:   Food causing negative symptoms related to EPI as evidenced by gastrointestinal symptoms.     Follow Up Visit      Follow Up:   4/27/2023    Total of 60 minutes spent with patient on nutrition counseling. Education based on Academy of Nutrition and Dietetics guidelines. Patient was provided with RD's contact information. Thank you for this referral.

## 2023-03-28 RX ORDER — AMLODIPINE BESYLATE 5 MG/1
5 TABLET ORAL DAILY
Qty: 90 TABLET | Refills: 1 | Status: SHIPPED | OUTPATIENT
Start: 2023-03-28

## 2023-05-01 ENCOUNTER — TELEHEALTH PROVIDED IN PATIENT'S HOME (OUTPATIENT)
Dept: URBAN - METROPOLITAN AREA TELEHEALTH 1 | Facility: TELEHEALTH | Age: 68
End: 2023-05-01

## 2023-05-01 VITALS — HEIGHT: 62 IN | WEIGHT: 175 LBS

## 2023-05-01 DIAGNOSIS — K62.89 OTHER SPECIFIED DISEASES OF ANUS AND RECTUM: ICD-10-CM

## 2023-05-01 DIAGNOSIS — R15.2 FECAL URGENCY: ICD-10-CM

## 2023-05-01 DIAGNOSIS — K58.2 MIXED IRRITABLE BOWEL SYNDROME: ICD-10-CM

## 2023-05-01 DIAGNOSIS — K86.81 EXOCRINE PANCREATIC INSUFFICIENCY: ICD-10-CM

## 2023-05-01 PROCEDURE — 99214 OFFICE O/P EST MOD 30 MIN: CPT | Performed by: NURSE PRACTITIONER

## 2023-05-01 RX ORDER — HYDROCORTISONE ACETATE 30 MG/1
SUPPOSITORY RECTAL
Qty: 30 | Refills: 2 | Status: ACTIVE
Start: 2023-05-01

## 2023-05-01 RX ORDER — SIMVASTATIN 40 MG
40 TABLET ORAL DAILY
Qty: 90 TABLET | Refills: 1 | OUTPATIENT
Start: 2023-05-01

## 2023-05-01 RX ORDER — SIMVASTATIN 40 MG
40 TABLET ORAL DAILY
Qty: 90 TABLET | Refills: 1 | Status: SHIPPED | OUTPATIENT
Start: 2023-05-01

## 2023-05-16 RX ORDER — LOSARTAN POTASSIUM 100 MG/1
100 TABLET ORAL DAILY
Qty: 90 TABLET | Refills: 1 | Status: SHIPPED | OUTPATIENT
Start: 2023-05-16

## 2023-05-26 ENCOUNTER — OFFICE VISIT (OUTPATIENT)
Dept: FAMILY MEDICINE CLINIC | Facility: CLINIC | Age: 68
End: 2023-05-26
Payer: COMMERCIAL

## 2023-05-26 VITALS
WEIGHT: 177.2 LBS | TEMPERATURE: 97.8 F | HEIGHT: 62 IN | SYSTOLIC BLOOD PRESSURE: 114 MMHG | HEART RATE: 78 BPM | OXYGEN SATURATION: 96 % | DIASTOLIC BLOOD PRESSURE: 68 MMHG | BODY MASS INDEX: 32.61 KG/M2

## 2023-05-28 ENCOUNTER — PATIENT MESSAGE (OUTPATIENT)
Dept: FAMILY MEDICINE CLINIC | Facility: CLINIC | Age: 68
End: 2023-05-28

## 2023-05-30 RX ORDER — SERTRALINE HYDROCHLORIDE 100 MG/1
100 TABLET, FILM COATED ORAL DAILY
Qty: 90 TABLET | Refills: 1 | Status: SHIPPED | OUTPATIENT
Start: 2023-05-30

## 2023-05-30 NOTE — TELEPHONE ENCOUNTER
From: Izabel Abreu  To: Destiny Eid  Sent: 5/28/2023 8:27 AM EDT  Subject: Serterline    Dr. Dixon:    I need a refill on my medication. Thank you.    Izabel Abreu

## 2023-06-01 ENCOUNTER — TELEPHONE (OUTPATIENT)
Dept: ENDOCRINOLOGY | Facility: CLINIC | Age: 68
End: 2023-06-01

## 2023-06-01 NOTE — TELEPHONE ENCOUNTER
Caller: Vaishali Abreu    Relationship: Self    Best call back number: [phone]    What is the best time to reach you: ANYTIME    Who are you requesting to speak with (clinical staff, provider,  specific staff member): CLINICAL    Do you know the name of the person who called: VAISHALI    What was the call regarding: PT HAS AN APPT Monday AND IS FASTING, WOULD LIKE TO KNOW IF SHE IS ALLOWED TO TAKE HER DAILY MEDICATION AND INJECTION

## 2023-06-05 ENCOUNTER — OFFICE VISIT (OUTPATIENT)
Dept: ENDOCRINOLOGY | Facility: CLINIC | Age: 68
End: 2023-06-05
Payer: COMMERCIAL

## 2023-06-05 VITALS
DIASTOLIC BLOOD PRESSURE: 70 MMHG | SYSTOLIC BLOOD PRESSURE: 148 MMHG | BODY MASS INDEX: 32.39 KG/M2 | WEIGHT: 176 LBS | HEART RATE: 68 BPM | HEIGHT: 62 IN

## 2023-06-05 DIAGNOSIS — E11.65 UNCONTROLLED TYPE 2 DIABETES MELLITUS WITH HYPERGLYCEMIA: Primary | ICD-10-CM

## 2023-06-05 PROBLEM — E11.9 DIABETES MELLITUS TYPE II, CONTROLLED: Status: RESOLVED | Noted: 2019-09-03 | Resolved: 2023-06-05

## 2023-06-05 LAB
EXPIRATION DATE: ABNORMAL
EXPIRATION DATE: NORMAL
GLUCOSE BLDC GLUCOMTR-MCNC: 174 MG/DL (ref 70–130)
HBA1C MFR BLD: 8 %
Lab: ABNORMAL
Lab: NORMAL

## 2023-06-05 RX ORDER — DICYCLOMINE HYDROCHLORIDE 10 MG/1
10 CAPSULE ORAL
COMMUNITY

## 2023-06-05 RX ORDER — GLIMEPIRIDE 1 MG/1
1 TABLET ORAL EVERY MORNING
Qty: 30 TABLET | Refills: 11 | Status: SHIPPED | OUTPATIENT
Start: 2023-06-05 | End: 2024-06-04

## 2023-06-05 NOTE — PROGRESS NOTES
"     Office Note      Date: 2023  Patient Name: Izabel Abreu  MRN: [id number]  : 1955    Chief Complaint   Patient presents with    Diabetes     Type II       History of Present Illness:   Izabel Abreu is a 67 y.o. female who presents for Diabetes type 2.   Current RX victoza/ metformin     Bg checks are done:bid   Hypoglycemia :none       Last A1c:  Hemoglobin A1C   Date Value Ref Range Status   2023 8.0 % Final       Changes in health since last visit: has been diagnosed  with EPI and put on creon. Since then, her sugars have been high . Last eye exam up to date .    Subjective      Diabetic Complications:  Eyes: No  Kidneys: No  Feet: No  Heart: No        Review of Systems:   Review of Systems   Gastrointestinal: Negative.    Musculoskeletal: Negative.      The following portions of the patient's history were reviewed and updated as appropriate: allergies, current medications, past family history, past medical history, past social history, past surgical history, and problem list.    Objective     Visit Vitals  /70 (BP Location: Left arm, Patient Position: Sitting, Cuff Size: Adult)   Pulse 68   Ht 157.5 cm (62\")   Wt 79.8 kg (176 lb)   BMI 32.19 kg/m²           Physical Exam:  Physical Exam  Vitals reviewed.   Constitutional:       Appearance: Normal appearance.   Neurological:      Mental Status: She is alert.   Psychiatric:         Mood and Affect: Mood normal.         Behavior: Behavior normal.         Thought Content: Thought content normal.         Judgment: Judgment normal.        Assessment / Plan      Assessment & Plan:  Problem List Items Addressed This Visit          Other    Uncontrolled type 2 diabetes mellitus with hyperglycemia (HCC) - Primary    Overview     Intolerant of saad  Intolerant on sglt2          Current Assessment & Plan     Diabetes is worsening.   Medication changes per orders.  Diabetes will be reassessed in 6 months.         Relevant Medications    " Liraglutide (Victoza) 18 MG/3ML solution pen-injector injection    metFORMIN (GLUCOPHAGE) 1000 MG tablet    glimepiride (Amaryl) 1 MG tablet    Other Relevant Orders    POC Glucose, Blood (Completed)    POC Glycosylated Hemoglobin (Hb A1C) (Completed)        Dez Sánchez MD   06/05/2023

## 2023-06-15 ENCOUNTER — OFFICE (OUTPATIENT)
Dept: URBAN - METROPOLITAN AREA CLINIC 4 | Facility: CLINIC | Age: 68
End: 2023-06-15

## 2023-06-15 VITALS — HEIGHT: 62 IN | DIASTOLIC BLOOD PRESSURE: 77 MMHG | WEIGHT: 178.8 LBS | SYSTOLIC BLOOD PRESSURE: 147 MMHG

## 2023-06-15 DIAGNOSIS — R15.9 FULL INCONTINENCE OF FECES: ICD-10-CM

## 2023-06-15 DIAGNOSIS — K58.2 MIXED IRRITABLE BOWEL SYNDROME: ICD-10-CM

## 2023-06-15 DIAGNOSIS — K86.81 EXOCRINE PANCREATIC INSUFFICIENCY: ICD-10-CM

## 2023-06-15 DIAGNOSIS — F41.9 ANXIETY DISORDER, UNSPECIFIED: ICD-10-CM

## 2023-06-15 DIAGNOSIS — K62.89 OTHER SPECIFIED DISEASES OF ANUS AND RECTUM: ICD-10-CM

## 2023-06-15 PROCEDURE — 99214 OFFICE O/P EST MOD 30 MIN: CPT | Performed by: NURSE PRACTITIONER

## 2023-06-19 ENCOUNTER — OFFICE VISIT (OUTPATIENT)
Dept: FAMILY MEDICINE CLINIC | Facility: CLINIC | Age: 68
End: 2023-06-19
Payer: COMMERCIAL

## 2023-06-19 VITALS
SYSTOLIC BLOOD PRESSURE: 120 MMHG | HEIGHT: 62 IN | WEIGHT: 189.9 LBS | DIASTOLIC BLOOD PRESSURE: 82 MMHG | OXYGEN SATURATION: 97 % | HEART RATE: 77 BPM | BODY MASS INDEX: 34.95 KG/M2

## 2023-06-19 DIAGNOSIS — M25.551 ACUTE HIP PAIN, RIGHT: Primary | ICD-10-CM

## 2023-06-19 RX ORDER — MELOXICAM 15 MG/1
15 TABLET ORAL DAILY
Qty: 14 TABLET | Refills: 0 | Status: SHIPPED | OUTPATIENT
Start: 2023-06-19

## 2023-06-19 RX ORDER — METHOCARBAMOL 750 MG/1
750 TABLET, FILM COATED ORAL 3 TIMES DAILY
Qty: 90 TABLET | Refills: 1 | Status: SHIPPED | OUTPATIENT
Start: 2023-06-19

## 2023-06-19 NOTE — ASSESSMENT & PLAN NOTE
"I suspect this is either a bursitis or tendinitis and I have recommended that we try tort prescription for meloxicam and methocarbamol and see if we can calm this down but we will get an x-ray since she is concerned about the possibility of fracture given her \"weakened bones.\"  I also suggested rest and icing the area for additional conservative management.  "

## 2023-06-19 NOTE — PROGRESS NOTES
"Answers submitted by the patient for this visit:  Primary Reason for Visit (Submitted on 6/19/2023)  What is the primary reason for your visit?: Lower Extremity Injury  Lower Extremity Injury Questionnaire (Submitted on 6/19/2023)  Chief Complaint: Lower extremity pain  Incident occurred: 12 to 24 hours ago  Incident location: other  Injury mechanism: a twisting injury  Pain location: right hip, right thigh, right leg, right knee, right ankle  Pain quality: aching, burning, shooting  Pain - numeric: 8/10  Pain course: intermittent  tingling: No  inability to bear weight: Yes  loss of motion: Yes  loss of sensation: No  muscle weakness: Yes  Foreign body present: no foreign bodies  Aggravated by: movement, weight bearing  Chief Complaint  Hip Pain (Right Hip x1 day)    Subjective          Izabel Abreu presents to [facility]  Lower Extremity Issue  Associated symptoms include arthralgias (right hip pain, acute no swelling). Pertinent negatives include no chest pain, chills, coughing, fatigue, fever or myalgias. The symptoms are aggravated by movement and weight bearing.  patient is here today for right hip pain that is affecting her right lateral hip and buttock area radiating around into the groin and down into the thigh.  She is not having any associated back pain.  She states that she was walking up some steps and was having difficulty and bent over and tried to crawl up the stairs and caught her leg in an awkward position and wants to make sure that she has not fractured something since she has \"weak bones.    Objective   Vital Signs:   /82 (BP Location: Left arm)   Pulse 77   Ht 157.5 cm (62\")   Wt 86.1 kg (189 lb 14.4 oz)   SpO2 97%   BMI 34.73 kg/m²     Body mass index is 34.73 kg/m².    Review of Systems   Constitutional:  Negative for chills, fatigue and fever.   Respiratory:  Negative for cough and shortness of breath.    Cardiovascular:  Negative for chest pain " and palpitations.   Musculoskeletal:  Positive for arthralgias (right hip pain, acute no swelling). Negative for back pain and myalgias.   Neurological:  Negative for dizziness and headache.   Psychiatric/Behavioral:  Negative for depressed mood. The patient is not nervous/anxious.      Past History:  Medical History: has a past medical history of Asthma, Diabetes mellitus, Elevated cholesterol, Exocrine pancreatic insufficiency, Flatulence, Heart murmur, Hypercalcemia, Hyperglycemia, Hyperparathyroidism, Hypertension, Irritable bowel syndrome, Sleep apnea, Subclinical hyperthyroidism, Type 2 diabetes mellitus, and Vitamin D deficiency.   Surgical History: has a past surgical history that includes Tubal ligation (Bilateral);  section; Shoulder surgery; Colonoscopy; Sinus surgery; Toe Surgery; d & c and laparoscopy; Parathyroidectomy (N/A, 2021); and Ovary surgery.   Family History: family history includes Diabetes in her father and mother; Hypertension in her father, mother, and sister; Thyroid disease in her sister.   Social History: reports that she has never smoked. She has been exposed to tobacco smoke. She has never used smokeless tobacco. She reports that she does not currently use alcohol. She reports that she does not currently use drugs.      Current Outpatient Medications:     acetaminophen (TYLENOL) 650 MG 8 hr tablet, Take 2 tablets by mouth Every 8 (Eight) Hours As Needed for Mild Pain., Disp: , Rfl:     Alpha-D-Galactosidase (Beano) tablet, Take 2 doses by mouth 3 (Three) Times a Day As Needed (FLATULENCE)., Disp: , Rfl:     amLODIPine (NORVASC) 5 MG tablet, TAKE 1 TABLET BY MOUTH DAILY, Disp: 90 tablet, Rfl: 1    bisoprolol (ZEBeta) 10 MG tablet, Take 1 tablet by mouth Daily., Disp: 90 tablet, Rfl: 1    clobetasol (TEMOVATE) 0.05 % ointment, Apply 1 application topically to the appropriate area as directed 3 (Three) Times a Day As Needed. As needed, Disp: , Rfl:     dicyclomine (BENTYL)  10 MG capsule, Take 1 capsule by mouth 4 (Four) Times a Day Before Meals & at Bedtime., Disp: , Rfl:     estradiol (ESTRACE) 0.1 MG/GM vaginal cream, Insert 1 g into the vagina 3 (Three) Times a Week if Needed (VAGINAL ATROPHY)., Disp: , Rfl:     fenofibrate micronized (LOFIBRA) 134 MG capsule, Take 1 capsule by mouth Daily., Disp: 90 capsule, Rfl: 1    fluticasone (FLONASE) 50 MCG/ACT nasal spray, 2 sprays into the nostril(s) as directed by provider Daily As Needed for Rhinitis or Allergies., Disp: , Rfl:     glimepiride (Amaryl) 1 MG tablet, Take 1 tablet by mouth Every Morning., Disp: 30 tablet, Rfl: 11    glucose blood (OneTouch Verio) test strip, TEST DAILY, Disp: 100 each, Rfl: 11    Insulin Pen Needle (BD Pen Needle Naz U/F) 32G X 4 MM misc, Use 1 tablet daily, Disp: 100 each, Rfl: 3    Liraglutide (Victoza) 18 MG/3ML solution pen-injector injection, Inject 1.8mg daily, Disp: 9 mL, Rfl: 5    losartan (COZAAR) 100 MG tablet, TAKE 1 TABLET BY MOUTH DAILY, Disp: 90 tablet, Rfl: 1    metFORMIN (GLUCOPHAGE) 1000 MG tablet, TAKE 1/2 TABLET BY MOUTH TWICE DAILY WITH MEALS, Disp: 90 tablet, Rfl: 3    olopatadine (PATADAY) 0.2 % solution ophthalmic solution, Administer 1 drop to both eyes Daily., Disp: , Rfl:     pancrelipase, Lip-Prot-Amyl, (CREON) 55394-73628 units capsule delayed-release particles capsule, Take 3 capsules by mouth 3 (Three) Times a Day With Meals., Disp: , Rfl:     Pancrelipase, Lip-Prot-Amyl, (CREON) 64362-296741 units capsule delayed-release particles capsule, Take 1 capsule by mouth 3 (Three) Times a Day With Meals., Disp: , Rfl:     sertraline (ZOLOFT) 100 MG tablet, Take 1 tablet by mouth Daily., Disp: 90 tablet, Rfl: 1    simvastatin (ZOCOR) 40 MG tablet, TAKE 1 TABLET BY MOUTH DAILY, Disp: 90 tablet, Rfl: 1    vitamin B-12 (CYANOCOBALAMIN) 1000 MCG tablet, Take 1 tablet by mouth Daily., Disp: , Rfl:     meloxicam (MOBIC) 15 MG tablet, Take 1 tablet by mouth Daily., Disp: 14 tablet, Rfl: 0    " methocarbamol (ROBAXIN) 750 MG tablet, Take 1 tablet by mouth 3 (Three) Times a Day., Disp: 90 tablet, Rfl: 1  Allergies: Diflucan [fluconazole], Azithromycin, Codeine, Hydrocodone-acetaminophen, Latex, Penicillins, and Quinapril    Physical Exam  Musculoskeletal:      Right hip: Tenderness present. No deformity or crepitus. Normal range of motion. Normal strength.      Comments: Pain with movement of the hip.  External rotation is painful.    Neurological:      Mental Status: She is alert and oriented to person, place, and time.   Psychiatric:         Behavior: Behavior normal.           Assessment and Plan   Diagnoses and all orders for this visit:    1. Acute hip pain, right (Primary)  Assessment & Plan:  I suspect this is either a bursitis or tendinitis and I have recommended that we try tort prescription for meloxicam and methocarbamol and see if we can calm this down but we will get an x-ray since she is concerned about the possibility of fracture given her \"weakened bones.\"  I also suggested rest and icing the area for additional conservative management.    Orders:  -     Cancel: XR Hip With or Without Pelvis 2 - 3 View Left; Future    Other orders  -     meloxicam (MOBIC) 15 MG tablet; Take 1 tablet by mouth Daily.  Dispense: 14 tablet; Refill: 0  -     methocarbamol (ROBAXIN) 750 MG tablet; Take 1 tablet by mouth 3 (Three) Times a Day.  Dispense: 90 tablet; Refill: 1            Follow Up   Return if symptoms worsen or fail to improve.  Patient was given instructions and counseling regarding her condition or for health maintenance advice. Please see specific information pulled into the AVS if appropriate.     Felicia Martínez PA-C  "

## 2023-07-31 DIAGNOSIS — M54.31 SCIATICA OF RIGHT SIDE: Primary | ICD-10-CM

## 2023-07-31 PROCEDURE — 96372 THER/PROPH/DIAG INJ SC/IM: CPT | Performed by: FAMILY MEDICINE

## 2023-07-31 RX ORDER — KETOROLAC TROMETHAMINE 30 MG/ML
30 INJECTION, SOLUTION INTRAMUSCULAR; INTRAVENOUS ONCE
Status: COMPLETED | OUTPATIENT
Start: 2023-07-31 | End: 2023-07-31

## 2023-07-31 RX ORDER — TRIAMCINOLONE ACETONIDE 40 MG/ML
40 INJECTION, SUSPENSION INTRA-ARTICULAR; INTRAMUSCULAR ONCE
Status: COMPLETED | OUTPATIENT
Start: 2023-07-31 | End: 2023-07-31

## 2023-07-31 RX ADMIN — TRIAMCINOLONE ACETONIDE 40 MG: 40 INJECTION, SUSPENSION INTRA-ARTICULAR; INTRAMUSCULAR at 11:59

## 2023-07-31 RX ADMIN — KETOROLAC TROMETHAMINE 30 MG: 30 INJECTION, SOLUTION INTRAMUSCULAR; INTRAVENOUS at 11:58

## 2023-08-01 RX ORDER — MELOXICAM 15 MG/1
15 TABLET ORAL DAILY
Qty: 30 TABLET | Refills: 0 | Status: SHIPPED | OUTPATIENT
Start: 2023-08-01

## 2023-08-10 RX ORDER — BLOOD SUGAR DIAGNOSTIC
STRIP MISCELLANEOUS
Qty: 100 EACH | Refills: 3 | Status: SHIPPED | OUTPATIENT
Start: 2023-08-10

## 2023-08-10 NOTE — TELEPHONE ENCOUNTER
Rx Refill Note  Requested Prescriptions     Pending Prescriptions Disp Refills    glucose blood (OneTouch Verio) test strip [Pharmacy Med Name: ONE TOUCH VERIO TEST ST(NEW)100S] 100 each 3     Sig: USE TO TEST BLOOD SUGARS DAILY. DX CODE E11.65          Last office visit with prescribing clinician: 6/5/2023     Next office visit with prescribing clinician: 9/29/2023         Harleen Mckinney MA  08/10/23, 11:01 EDT

## 2023-08-14 RX ORDER — BISOPROLOL FUMARATE 10 MG/1
10 TABLET, FILM COATED ORAL DAILY
Qty: 90 TABLET | Refills: 1 | Status: SHIPPED | OUTPATIENT
Start: 2023-08-14

## 2023-08-14 NOTE — TELEPHONE ENCOUNTER
Rx Refill Note  Requested Prescriptions     Pending Prescriptions Disp Refills    bisoprolol (ZEBeta) 10 MG tablet 90 tablet 1     Sig: Take 1 tablet by mouth Daily.          Last office visit with prescribing clinician: 6/5/2023     Next office visit with prescribing clinician: 9/29/2023         Harleen Mckinney MA  08/14/23, 09:13 EDT

## 2023-09-05 RX ORDER — AMLODIPINE BESYLATE 5 MG/1
5 TABLET ORAL DAILY
Qty: 90 TABLET | Refills: 1 | Status: SHIPPED | OUTPATIENT
Start: 2023-09-05

## 2023-09-05 NOTE — TELEPHONE ENCOUNTER
Rx Refill Note  Requested Prescriptions     Pending Prescriptions Disp Refills    amLODIPine (NORVASC) 5 MG tablet 90 tablet 1     Sig: Take 1 tablet by mouth Daily.          Last office visit with prescribing clinician: 6/5/2023     Next office visit with prescribing clinician: 9/29/2023         Harleen Mckinney MA  09/05/23, 09:52 EDT

## 2023-09-29 ENCOUNTER — OFFICE VISIT (OUTPATIENT)
Dept: ENDOCRINOLOGY | Facility: CLINIC | Age: 68
End: 2023-09-29
Payer: COMMERCIAL

## 2023-09-29 VITALS
HEART RATE: 68 BPM | HEIGHT: 62 IN | WEIGHT: 181 LBS | BODY MASS INDEX: 33.31 KG/M2 | OXYGEN SATURATION: 98 % | SYSTOLIC BLOOD PRESSURE: 116 MMHG | DIASTOLIC BLOOD PRESSURE: 73 MMHG

## 2023-09-29 DIAGNOSIS — I10 ESSENTIAL HYPERTENSION: ICD-10-CM

## 2023-09-29 DIAGNOSIS — E11.65 UNCONTROLLED TYPE 2 DIABETES MELLITUS WITH HYPERGLYCEMIA: Primary | ICD-10-CM

## 2023-09-29 LAB
EXPIRATION DATE: NORMAL
EXPIRATION DATE: NORMAL
GLUCOSE BLDC GLUCOMTR-MCNC: 111 MG/DL (ref 70–130)
HBA1C MFR BLD: 6.2 %
Lab: NORMAL
Lab: NORMAL

## 2023-09-29 RX ORDER — TIRZEPATIDE 7.5 MG/.5ML
0.5 INJECTION, SOLUTION SUBCUTANEOUS WEEKLY
Qty: 2 ML | Refills: 5 | Status: SHIPPED | OUTPATIENT
Start: 2023-09-29

## 2023-09-29 RX ORDER — LOSARTAN POTASSIUM 100 MG/1
100 TABLET ORAL DAILY
Qty: 90 TABLET | Refills: 1 | Status: SHIPPED | OUTPATIENT
Start: 2023-09-29

## 2023-09-29 RX ORDER — SIMVASTATIN 40 MG
40 TABLET ORAL DAILY
Qty: 90 TABLET | Refills: 1 | Status: SHIPPED | OUTPATIENT
Start: 2023-09-29

## 2023-09-29 NOTE — PROGRESS NOTES
"     Office Note      Date: 2023  Patient Name: Izabel Abreu  MRN: [id number]  : 1955    Chief Complaint   Patient presents with    Diabetes       History of Present Illness:   Izabel Abreu is a 67 y.o. female who presents for Diabetes type 2.   Current RX victoza, amaryl/ metformin      Bg checks are done:twice daily   Hypoglycemia :78 was the lowest    She notes increased hunger, weight gain, fingers and hands are tingling  The injection sites hurt when she withdraws the needle and  the pen needles bend with injection      Last A1c:  Hemoglobin A1C   Date Value Ref Range Status   2023 6.2 % Final       Changes in health since last visit: had hip pain . Last eye exam summer 2023.    Subjective           Review of Systems:   Review of Systems   Constitutional:  Positive for unexpected weight change.   Neurological:  Positive for numbness.     The following portions of the patient's history were reviewed and updated as appropriate: allergies, current medications, past family history, past medical history, past social history, past surgical history, and problem list.    Objective     Visit Vitals  /73   Pulse 68   Ht 157.5 cm (62\")   Wt 82.1 kg (181 lb)   SpO2 98%   BMI 33.11 kg/m²           Physical Exam:  Physical Exam  Vitals reviewed.   Constitutional:       Appearance: Normal appearance.   Neurological:      Mental Status: She is alert.   Psychiatric:         Mood and Affect: Mood normal.         Behavior: Behavior normal.         Thought Content: Thought content normal.         Judgment: Judgment normal.        Assessment / Plan      Assessment & Plan:  Problem List Items Addressed This Visit          Other    Uncontrolled type 2 diabetes mellitus with hyperglycemia - Primary    Overview     Intolerant of saad  Intolerant on sglt2          Current Assessment & Plan     A1c is much improved but she is experiencing unacceptable side effects of weight gain and hypoglycemia and " discomfort from the victoza injections.   We can switch victoza and glimpiride to mounjaro         Relevant Medications    metFORMIN (GLUCOPHAGE) 1000 MG tablet    Tirzepatide (Mounjaro) 7.5 MG/0.5ML solution pen-injector    Other Relevant Orders    POC Glucose, Blood (Completed)    POC Glycosylated Hemoglobin (Hb A1C) (Completed)    Essential hypertension    Current Assessment & Plan     Hypertension is unchanged.  Continue current treatment regimen.  Blood pressure will be reassessed at the next regular appointment.         Relevant Medications    bisoprolol (ZEBeta) 10 MG tablet    amLODIPine (NORVASC) 5 MG tablet    losartan (COZAAR) 100 MG tablet        Dez Sánchez MD   09/29/2023

## 2023-09-29 NOTE — ASSESSMENT & PLAN NOTE
A1c is much improved but she is experiencing unacceptable side effects of weight gain and hypoglycemia and discomfort from the victoza injections.   We can switch victoza and glimpiride to mounjaro

## 2023-10-02 ENCOUNTER — TELEPHONE (OUTPATIENT)
Dept: ENDOCRINOLOGY | Facility: CLINIC | Age: 68
End: 2023-10-02
Payer: COMMERCIAL

## 2023-10-02 NOTE — TELEPHONE ENCOUNTER
PLEASE CALL [facility]   [phone]  WE GAVE HER CLAY 2.5 IN THE OFFICE THEY NEED A RX FOR THE 5 BEFORE SHE CAN JUMP TO THE 7.5  PLEASE SEND THIS IN FOR HER

## 2023-10-02 NOTE — TELEPHONE ENCOUNTER
No. She was on full dose victoza and just needs to start the 7.5 mg of mounjaro. Does not really need to use the samples

## 2023-10-03 RX ORDER — TIRZEPATIDE 5 MG/.5ML
0.5 INJECTION, SOLUTION SUBCUTANEOUS WEEKLY
Qty: 2 ML | Refills: 0 | Status: SHIPPED | OUTPATIENT
Start: 2023-10-03

## 2023-10-09 RX ORDER — FENOFIBRATE 134 MG/1
134 CAPSULE ORAL DAILY
Qty: 90 CAPSULE | Refills: 0 | Status: SHIPPED | OUTPATIENT
Start: 2023-10-09

## 2023-10-09 NOTE — TELEPHONE ENCOUNTER
Rx Refill Note  Requested Prescriptions     Pending Prescriptions Disp Refills    fenofibrate micronized (LOFIBRA) 134 MG capsule [Pharmacy Med Name: FENOFIBRATE 134MG CAPSULES] 90 capsule 1     Sig: TAKE 1 CAPSULE BY MOUTH DAILY      Last office visit with prescribing clinician: 9/29/2023   Last telemedicine visit with prescribing clinician: Visit date not found   Next office visit with prescribing clinician: 10/8/2023                         Would you like a call back once the refill request has been completed: [] Yes [] No    If the office needs to give you a call back, can they leave a voicemail: [] Yes [] No    Jina Chi CMA  10/09/23, 08:59 EDT

## 2023-11-01 ENCOUNTER — OFFICE (OUTPATIENT)
Dept: URBAN - METROPOLITAN AREA CLINIC 4 | Facility: CLINIC | Age: 68
End: 2023-11-01

## 2023-11-01 VITALS — HEIGHT: 62 IN | SYSTOLIC BLOOD PRESSURE: 147 MMHG | WEIGHT: 180 LBS | DIASTOLIC BLOOD PRESSURE: 75 MMHG

## 2023-11-01 DIAGNOSIS — R15.2 FECAL URGENCY: ICD-10-CM

## 2023-11-01 DIAGNOSIS — K86.81 EXOCRINE PANCREATIC INSUFFICIENCY: ICD-10-CM

## 2023-11-01 DIAGNOSIS — R15.1 FECAL SMEARING: ICD-10-CM

## 2023-11-01 DIAGNOSIS — K58.2 MIXED IRRITABLE BOWEL SYNDROME: ICD-10-CM

## 2023-11-01 DIAGNOSIS — R15.9 FULL INCONTINENCE OF FECES: ICD-10-CM

## 2023-11-01 PROCEDURE — 99214 OFFICE O/P EST MOD 30 MIN: CPT | Performed by: NURSE PRACTITIONER

## 2023-11-01 RX ORDER — PANCRELIPASE 36000; 180000; 114000 [USP'U]/1; [USP'U]/1; [USP'U]/1
108 CAPSULE, DELAYED RELEASE PELLETS ORAL
Qty: 180 | Refills: 11 | Status: ACTIVE
Start: 2023-02-21

## 2023-11-03 ENCOUNTER — TELEPHONE (OUTPATIENT)
Dept: FAMILY MEDICINE CLINIC | Facility: CLINIC | Age: 68
End: 2023-11-03
Payer: COMMERCIAL

## 2023-11-03 ENCOUNTER — OFFICE VISIT (OUTPATIENT)
Dept: FAMILY MEDICINE CLINIC | Facility: CLINIC | Age: 68
End: 2023-11-03
Payer: COMMERCIAL

## 2023-11-03 VITALS
HEIGHT: 62 IN | BODY MASS INDEX: 33.23 KG/M2 | OXYGEN SATURATION: 98 % | WEIGHT: 180.6 LBS | HEART RATE: 78 BPM | SYSTOLIC BLOOD PRESSURE: 112 MMHG | DIASTOLIC BLOOD PRESSURE: 72 MMHG

## 2023-11-03 DIAGNOSIS — U07.1 COVID-19 VIRUS INFECTION: ICD-10-CM

## 2023-11-03 DIAGNOSIS — E78.2 HYPERLIPIDEMIA, MIXED: ICD-10-CM

## 2023-11-03 DIAGNOSIS — U07.1 COVID-19 VIRUS INFECTION: Primary | ICD-10-CM

## 2023-11-03 RX ORDER — DEXTROMETHORPHAN HYDROBROMIDE AND PROMETHAZINE HYDROCHLORIDE 15; 6.25 MG/5ML; MG/5ML
5 SYRUP ORAL 4 TIMES DAILY PRN
Qty: 240 ML | Refills: 3 | Status: SHIPPED | OUTPATIENT
Start: 2023-11-03

## 2023-11-03 RX ORDER — BENZONATATE 200 MG/1
200 CAPSULE ORAL 3 TIMES DAILY PRN
Qty: 20 CAPSULE | Refills: 3 | Status: SHIPPED | OUTPATIENT
Start: 2023-11-03

## 2023-11-03 RX ORDER — FLUTICASONE PROPIONATE AND SALMETEROL 50; 250 UG/1; UG/1
2 POWDER RESPIRATORY (INHALATION)
COMMUNITY
Start: 2023-11-01

## 2023-11-03 RX ORDER — BENZONATATE 200 MG/1
200 CAPSULE ORAL 3 TIMES DAILY PRN
Qty: 20 CAPSULE | Refills: 3 | Status: SHIPPED | OUTPATIENT
Start: 2023-11-03 | End: 2023-11-03 | Stop reason: SDUPTHER

## 2023-11-03 RX ORDER — DEXTROMETHORPHAN HYDROBROMIDE AND PROMETHAZINE HYDROCHLORIDE 15; 6.25 MG/5ML; MG/5ML
5 SYRUP ORAL 4 TIMES DAILY PRN
Qty: 240 ML | Refills: 3 | Status: SHIPPED | OUTPATIENT
Start: 2023-11-03 | End: 2023-11-03 | Stop reason: SDUPTHER

## 2023-11-03 NOTE — PROGRESS NOTES
"Chief Complaint  Cough and covid 19 (tested post at home yesterdday)    Subjective    History of Present Illness:  Izabel Abreu is a 67 y.o. female who presents today for cough and congestion with home testing returning positive for COVID yesterday.    She was exposed to a coworker last week who had COVID and started getting symptoms on Tuesday this week.  She had congestion and coughing with positive home COVID testing yesterday.    She has done well with Paxlovid in the past and is interested in treatment Paxlovid to help reduce the duration and severity of her symptoms along with risk for hospitalization and death.    She does understand the need to hold her cholesterol medications including simvastatin and fenofibrate while on treatment with Paxlovid to minimize side effect risk.    Her  is currently not having symptoms but we did discuss if he does have symptoms and test positive he would also need to be seen to consider treatment based upon his renal function and medication list with potential for interactions.        Objective   Vital Signs:   /72   Pulse 78   Ht 157.5 cm (62\")   Wt 81.9 kg (180 lb 9.6 oz)   SpO2 98%   BMI 33.03 kg/m²     Review of Systems   Constitutional:  Positive for fatigue. Negative for chills and fever.   HENT:  Positive for ear pain, postnasal drip and rhinorrhea.    Respiratory:  Positive for cough.    Cardiovascular:  Negative for chest pain.       Past History:  Medical History: has a past medical history of Asthma, Diabetes mellitus, Elevated cholesterol, Exocrine pancreatic insufficiency, Flatulence, Heart murmur, Hypercalcemia, Hyperglycemia, Hyperparathyroidism, Hypertension, Irritable bowel syndrome, Sleep apnea, Subclinical hyperthyroidism, Type 2 diabetes mellitus, and Vitamin D deficiency.   Surgical History: has a past surgical history that includes Tubal ligation (Bilateral);  section; Shoulder surgery; Colonoscopy; Sinus surgery; Toe " Surgery; d & c and laparoscopy; Parathyroidectomy (N/A, 7/16/2021); and Ovary surgery.   Family History: family history includes Diabetes in her father and mother; Hypertension in her father, mother, and sister; Thyroid disease in her sister.   Social History: reports that she has never smoked. She has been exposed to tobacco smoke. She has never used smokeless tobacco. She reports that she does not currently use alcohol. She reports that she does not currently use drugs.      Current Outpatient Medications:     acetaminophen (TYLENOL) 650 MG 8 hr tablet, Take 2 tablets by mouth Every 8 (Eight) Hours As Needed for Mild Pain., Disp: , Rfl:     Advair Diskus 250-50 MCG/ACT DISKUS, Inhale 2 puffs 2 (Two) Times a Day., Disp: , Rfl:     Alpha-D-Galactosidase (Beano) tablet, Take 2 doses by mouth 3 (Three) Times a Day As Needed (FLATULENCE)., Disp: , Rfl:     amLODIPine (NORVASC) 5 MG tablet, Take 1 tablet by mouth Daily., Disp: 90 tablet, Rfl: 1    bisoprolol (ZEBeta) 10 MG tablet, Take 1 tablet by mouth Daily., Disp: 90 tablet, Rfl: 1    clobetasol (TEMOVATE) 0.05 % ointment, Apply 1 application  topically to the appropriate area as directed 3 (Three) Times a Day As Needed. As needed, Disp: , Rfl:     dicyclomine (BENTYL) 10 MG capsule, Take 1 capsule by mouth 4 (Four) Times a Day Before Meals & at Bedtime., Disp: , Rfl:     estradiol (ESTRACE) 0.1 MG/GM vaginal cream, Insert 1 g into the vagina 3 (Three) Times a Week if Needed (VAGINAL ATROPHY)., Disp: , Rfl:     fenofibrate micronized (LOFIBRA) 134 MG capsule, TAKE 1 CAPSULE BY MOUTH DAILY, Disp: 90 capsule, Rfl: 0    fluticasone (FLONASE) 50 MCG/ACT nasal spray, 2 sprays into the nostril(s) as directed by provider Daily As Needed for Rhinitis or Allergies., Disp: , Rfl:     glucose blood (OneTouch Verio) test strip, USE TO TEST BLOOD SUGARS DAILY. DX CODE E11.65, Disp: 100 each, Rfl: 3    losartan (COZAAR) 100 MG tablet, Take 1 tablet by mouth Daily., Disp: 90  tablet, Rfl: 1    metFORMIN (GLUCOPHAGE) 1000 MG tablet, TAKE 1/2 TABLET BY MOUTH TWICE DAILY WITH MEALS, Disp: 90 tablet, Rfl: 3    olopatadine (PATADAY) 0.2 % solution ophthalmic solution, Administer 1 drop to both eyes Daily., Disp: , Rfl:     pancrelipase, Lip-Prot-Amyl, (CREON) 92598-72647 units capsule delayed-release particles capsule, Take 3 capsules by mouth 3 (Three) Times a Day With Meals., Disp: , Rfl:     sertraline (ZOLOFT) 100 MG tablet, Take 1 tablet by mouth Daily., Disp: 90 tablet, Rfl: 1    simvastatin (ZOCOR) 40 MG tablet, Take 1 tablet by mouth Daily., Disp: 90 tablet, Rfl: 1    Tirzepatide (Mounjaro) 5 MG/0.5ML solution pen-injector, Inject 0.5 mL under the skin into the appropriate area as directed 1 (One) Time Per Week., Disp: 2 mL, Rfl: 0    vitamin B-12 (CYANOCOBALAMIN) 1000 MCG tablet, Take 1 tablet by mouth Daily., Disp: , Rfl:     benzonatate (TESSALON) 200 MG capsule, Take 1 capsule by mouth 3 (Three) Times a Day As Needed for Cough., Disp: 20 capsule, Rfl: 3    meloxicam (MOBIC) 15 MG tablet, TAKE 1 TABLET BY MOUTH DAILY, Disp: 30 tablet, Rfl: 0    methocarbamol (ROBAXIN) 750 MG tablet, Take 1 tablet by mouth 3 (Three) Times a Day., Disp: 90 tablet, Rfl: 1    Nirmatrelvir&Ritonavir 300/100 (PAXLOVID) 20 x 150 MG & 10 x 100MG tablet therapy pack tablet, Take 3 tablets by mouth 2 (Two) Times a Day for 5 days. Indications: COVID-19 Confirmed Infection, ., Disp: 30 tablet, Rfl: 0    promethazine-dextromethorphan (PROMETHAZINE-DM) 6.25-15 MG/5ML syrup, Take 5 mL by mouth 4 (Four) Times a Day As Needed for Cough., Disp: 240 mL, Rfl: 3    Allergies: Diflucan [fluconazole], Azithromycin, Codeine, Hydrocodone-acetaminophen, Latex, Penicillins, and Quinapril    Physical Exam  Constitutional:       Appearance: She is obese.   HENT:      Head: Normocephalic.      Right Ear: Tympanic membrane, ear canal and external ear normal.      Left Ear: Tympanic membrane, ear canal and external ear normal.       Nose: Nose normal. Congestion present.   Eyes:      Pupils: Pupils are equal, round, and reactive to light.   Cardiovascular:      Rate and Rhythm: Normal rate and regular rhythm.      Heart sounds: Normal heart sounds.   Pulmonary:      Effort: Pulmonary effort is normal.      Breath sounds: Normal breath sounds. No wheezing.   Musculoskeletal:         General: Normal range of motion.      Cervical back: Normal range of motion.   Skin:     General: Skin is warm and dry.   Neurological:      General: No focal deficit present.      Mental Status: She is alert.   Psychiatric:         Mood and Affect: Mood normal.         Behavior: Behavior normal.         Thought Content: Thought content normal.          Result Review                   Assessment and Plan  Diagnoses and all orders for this visit:    1. COVID-19 virus infection (Primary)  Assessment & Plan:  Discussed symptom onset within the past 5 days and positive home testing yesterday with exposure at work last week.    She is interested in treatment with Paxlovid to help reduce the symptom duration and severity with the COVID-19 infection along with reduce the risk for hospitalization and death.    We did review her most recent renal function which returned normal with last labs and discussed need to hold cholesterol treatment while on Paxlovid to minimize side effect risk.    We did discuss contact precautions and home quarantine to reduce the risk of spread to others as well.    Given a prescription for Paxlovid to take twice a day for 5 days and she was also given Tessalon for daytime cough and Phenergan DM for p.m. cough congestion.    Close follow-up with no improvement or worsening    Orders:  -     Nirmatrelvir&Ritonavir 300/100 (PAXLOVID) 20 x 150 MG & 10 x 100MG tablet therapy pack tablet; Take 3 tablets by mouth 2 (Two) Times a Day for 5 days. Indications: COVID-19 Confirmed Infection, .  Dispense: 30 tablet; Refill: 0  -     benzonatate (TESSALON)  200 MG capsule; Take 1 capsule by mouth 3 (Three) Times a Day As Needed for Cough.  Dispense: 20 capsule; Refill: 3  -     promethazine-dextromethorphan (PROMETHAZINE-DM) 6.25-15 MG/5ML syrup; Take 5 mL by mouth 4 (Four) Times a Day As Needed for Cough.  Dispense: 240 mL; Refill: 3    2. Hyperlipidemia, mixed  Assessment & Plan:  See above.  Discussed need to hold cholesterol treatment while on Paxlovid and she may restart after finishing with Paxlovid.  Patient voiced understanding and is comfortable with plan                     Follow Up  Return if symptoms worsen or fail to improve.    Felipe Evans MD

## 2023-11-03 NOTE — TELEPHONE ENCOUNTER
Caller: Izabel Abreu    Relationship: Self    Best call back number: [phone]     What was the call regarding: PATIENT CALLED TO REQUEST THE PRESCRIPTIONS THAT WERE SENT TO THE PHARMACY TODAY.  PATIENT STATED THAT SHE DIDN'T HAVE A PHARMACIST AT THAT LOCATION.      [facility] [id number] - [place], [place] - [address] AT [facility] [address] - [phone]  - [phone] FX

## 2023-11-03 NOTE — ASSESSMENT & PLAN NOTE
See above.  Discussed need to hold cholesterol treatment while on Paxlovid and she may restart after finishing with Paxlovid.  Patient voiced understanding and is comfortable with plan

## 2023-11-03 NOTE — ASSESSMENT & PLAN NOTE
Discussed symptom onset within the past 5 days and positive home testing yesterday with exposure at work last week.    She is interested in treatment with Paxlovid to help reduce the symptom duration and severity with the COVID-19 infection along with reduce the risk for hospitalization and death.    We did review her most recent renal function which returned normal with last labs and discussed need to hold cholesterol treatment while on Paxlovid to minimize side effect risk.    We did discuss contact precautions and home quarantine to reduce the risk of spread to others as well.    Given a prescription for Paxlovid to take twice a day for 5 days and she was also given Tessalon for daytime cough and Phenergan DM for p.m. cough congestion.    Close follow-up with no improvement or worsening

## 2023-11-03 NOTE — TELEPHONE ENCOUNTER
PATIENT CALLED BACK TO FIND OUT IF WE WERE SENDING HER MEDICATIONS TO A DIFFERENT PHARMACY. PLEASE ADVISE.

## 2023-11-06 ENCOUNTER — TELEPHONE (OUTPATIENT)
Dept: FAMILY MEDICINE CLINIC | Facility: CLINIC | Age: 68
End: 2023-11-06

## 2023-11-06 NOTE — TELEPHONE ENCOUNTER
Caller: Izabel Abreu    Relationship: Self    Best call back number: [phone]       What was the call regarding: COUGH MEDICATION GIVEN FOR COUGH HAS NOT WORKED, PATIENT COUGHS ALL NIGHT TO THE POINT OF HAVING ACCIDENTS. CAN SOMETHING LIKE MUCINEX HELP, PLEASE ADVISE    Is it okay if the provider responds through MyChart: YES    [facility] [id number] - [place], [place] - [address] [facility] AT [facility] [address] - [phone]  - [phone] FX

## 2023-11-07 NOTE — TELEPHONE ENCOUNTER
She can stop the phenergan DM and change to either mucinex DM or Delsym otc.  She may also continue to use the benzonatate if needed.  It can take 1-2 weeks after she is finished the paxlovid for the cough to resolve but if not improving then I would like her to come in next week sometime for chest xray.

## 2023-11-16 ENCOUNTER — TELEPHONE (OUTPATIENT)
Dept: FAMILY MEDICINE CLINIC | Facility: CLINIC | Age: 68
End: 2023-11-16

## 2023-11-16 DIAGNOSIS — R05.1 ACUTE COUGH: Primary | ICD-10-CM

## 2023-11-16 NOTE — TELEPHONE ENCOUNTER
Caller: Izabel Abreu    Relationship: Self    Best call back number: [phone]     What orders are you requesting (i.e. lab or imaging): CHEST XRAY    In what timeframe would the patient need to come in: ASAP    Where will you receive your lab/imaging services:  [facility])    Additional notes: PATIENT STATED THAT DR SHER INFORMED HER THAT OF HER COUGH CONTINUED AND IF AT NIGHT IT KEPT CAUSING HER TO GAG AND GET CHOKED SHE WOULD NEED A CHEST XRAY. SHE IS STILL HAVING ISSUES AND IS WANTING AN ORDER FOR THE XRAY ASAP. PLEASE ADVISE AND CALL PATIENT ONCE THE ORDER IS IN THE SYSTEM

## 2023-11-29 ENCOUNTER — OFFICE VISIT (OUTPATIENT)
Dept: UROLOGY | Facility: CLINIC | Age: 68
End: 2023-11-29
Payer: COMMERCIAL

## 2023-11-29 ENCOUNTER — OFFICE VISIT (OUTPATIENT)
Dept: FAMILY MEDICINE CLINIC | Facility: CLINIC | Age: 68
End: 2023-11-29
Payer: COMMERCIAL

## 2023-11-29 VITALS
HEART RATE: 72 BPM | BODY MASS INDEX: 32.85 KG/M2 | HEIGHT: 62 IN | SYSTOLIC BLOOD PRESSURE: 136 MMHG | OXYGEN SATURATION: 97 % | DIASTOLIC BLOOD PRESSURE: 88 MMHG | TEMPERATURE: 97.4 F | WEIGHT: 178.5 LBS

## 2023-11-29 VITALS
HEIGHT: 62 IN | HEART RATE: 67 BPM | SYSTOLIC BLOOD PRESSURE: 124 MMHG | DIASTOLIC BLOOD PRESSURE: 72 MMHG | BODY MASS INDEX: 32.76 KG/M2 | OXYGEN SATURATION: 98 % | WEIGHT: 178 LBS

## 2023-11-29 DIAGNOSIS — F41.1 GAD (GENERALIZED ANXIETY DISORDER): ICD-10-CM

## 2023-11-29 DIAGNOSIS — E55.9 VITAMIN D DEFICIENCY: ICD-10-CM

## 2023-11-29 DIAGNOSIS — N32.81 OAB (OVERACTIVE BLADDER): ICD-10-CM

## 2023-11-29 DIAGNOSIS — R15.9 INCONTINENCE OF FECES, UNSPECIFIED FECAL INCONTINENCE TYPE: Primary | ICD-10-CM

## 2023-11-29 DIAGNOSIS — Z00.00 ENCOUNTER FOR WELLNESS EXAMINATION IN ADULT: Primary | ICD-10-CM

## 2023-11-29 DIAGNOSIS — N39.46 MIXED STRESS AND URGE URINARY INCONTINENCE: ICD-10-CM

## 2023-11-29 DIAGNOSIS — M62.89 PELVIC FLOOR DYSFUNCTION IN FEMALE: ICD-10-CM

## 2023-11-29 DIAGNOSIS — Z23 PNEUMOCOCCAL VACCINATION ADMINISTERED AT CURRENT VISIT: ICD-10-CM

## 2023-11-29 DIAGNOSIS — R06.2 WHEEZING: ICD-10-CM

## 2023-11-29 RX ORDER — OXYBUTYNIN CHLORIDE 10 MG/1
10 TABLET, EXTENDED RELEASE ORAL DAILY
Qty: 30 TABLET | Refills: 2 | Status: SHIPPED | OUTPATIENT
Start: 2023-11-29 | End: 2024-02-27

## 2023-11-29 RX ORDER — TIRZEPATIDE 7.5 MG/.5ML
INJECTION, SOLUTION SUBCUTANEOUS WEEKLY
COMMUNITY
Start: 2023-11-18

## 2023-11-29 RX ORDER — SERTRALINE HYDROCHLORIDE 100 MG/1
100 TABLET, FILM COATED ORAL DAILY
Qty: 90 TABLET | Refills: 1 | Status: SHIPPED | OUTPATIENT
Start: 2023-11-29

## 2023-11-29 NOTE — PROGRESS NOTES
[facility] Female Office Visit      Patient Name: Izabel Abreu  : 1955   MRN: [id number]     Chief Complaint:  Lower Urinary Tract Symptoms.   Chief Complaint   Patient presents with    Urinary Incontinence       Referring Provider: Fidencio Duong MD    History of Present Illness: Mr. Abreu is a 68 y.o. female with history lower urinary tract symptoms.  She reports she has had incontinence since 2018.  She uses estradiol cream.  She has a lot of urgency and frequency.  She also has bowel urgency.    Occasional leaking with coughing and sneezing.  She uses 1-2 depends per day.   She has not been on any medication for her issue.    She is no longer on any lasix which has helped.   She was seen by Ira Wagner in  and was having trouble emptying her bladder.  At that time she was started on bethanecol but did not actually take it.   She feels like she is emptying better now.    No dysuria or gross hematuria.  She reports she had a urinary tract infections 6 weeks ago which was treated with antibiotics.  Her urine is negative today.   She has had two children one by  and one vaginally.  No issues with delivery.   She has undergone bilateral oophorectomy but never had hysterectomy.  She sees GYN regularly.      She drinks approximately 45-50 oz of water per day.  Rare caffeine intake.      Bladder & Bowel Symptom Questionnaire    How often do you usually urinate during the day ?   2 - About every 2-3 hours    2.   How many timed do you urinate at night?   2 - About every 2-3 hours    3.   What is the reason that you usually urinate?   3 - About every 1-2 hours   4.   Once you get the urge to go, how long can you     comfortably delay?   2 - About every 2-3 hours    5.   How often do you get a sudden urge that makes you rush to the bathroom?   3 - About every 1-2 hours   6.   How often does a sudden urge to urinate result in you leaking urine or wetting pads?   4 - At least once an  hour    7.  In your opinion, how good is your bladder control?   3 - About every 1-2 hours   8.  Do you have accidental bowel leakage?   yes   9.  Do you have difficulty fully emptying your bladder?   yes   10.  Do you experience accidental leakage when performing some physical activity such as coughing, sneezing, laughing or exercise?   yes   11. Have you tried medications to help improve your symptoms?   no   12. Would you be interested in learning about a long-lasting option that may help you with your symptoms?   yes                                                                             Total Score   18     0-7 (Mild) 8-16 (Moderate) 17-28 (Severe)        Subjective      Review of System:   Review of Systems   Constitutional:  Negative for chills, fatigue, fever and unexpected weight change.   HENT:  Negative for congestion, rhinorrhea and sore throat.    Eyes:  Negative for visual disturbance.   Respiratory:  Negative for apnea, cough, chest tightness and shortness of breath.    Cardiovascular:  Negative for chest pain.   Gastrointestinal:  Negative for abdominal pain, constipation, diarrhea, nausea and vomiting.   Endocrine: Negative for polydipsia and polyuria.   Genitourinary:  Negative for difficulty urinating, dysuria, enuresis, flank pain, frequency, genital sores, hematuria and urgency.   Musculoskeletal:  Negative for gait problem.   Skin:  Negative for rash and wound.   Allergic/Immunologic: Negative for immunocompromised state.   Neurological:  Negative for dizziness, tremors, syncope, weakness and light-headedness.   Hematological:  Does not bruise/bleed easily.      I have reviewed the ROS documented by my clinical staff, I have updated appropriately and I agree. Christine Ryder MD    Past Medical History:  Past Medical History:   Diagnosis Date    Allergic     Anxiety     Arthritis     Asthma     Colon polyp 2023    Removed 6 polys 1 normat others stage 2    Diabetes mellitus      Diverticulosis     Elevated cholesterol     Exocrine pancreatic insufficiency     Flatulence     Heart murmur     HL (hearing loss) 23    Need hearing aides    Hypercalcemia     Hyperglycemia     Hyperparathyroidism     Hypertension     Irritable bowel syndrome     Sleep apnea     CPAP 7/14CM    Subclinical hyperthyroidism     Type 2 diabetes mellitus     Vitamin D deficiency        Past Surgical History:  Past Surgical History:   Procedure Laterality Date     SECTION      COLONOSCOPY      SCHEDULED FOR     D & C AND LAPAROSCOPY      OVARY SURGERY      and tubes    PARATHYROIDECTOMY N/A 2021    Procedure: PARATHYROIDECTOMY;  Surgeon: Apolinar Suh MD;  Location: [facility];  Service: General;  Laterality: N/A;    SHOULDER SURGERY      shoulder joint surgery-Abstracted from [facility]     SINUS SURGERY      TOE SURGERY      TUBAL ABDOMINAL LIGATION Bilateral        Medications:    Current Outpatient Medications:     acetaminophen (TYLENOL) 650 MG 8 hr tablet, Take 2 tablets by mouth Every 8 (Eight) Hours As Needed for Mild Pain., Disp: , Rfl:     Alpha-D-Galactosidase (Beano) tablet, Take 2 doses by mouth 3 (Three) Times a Day As Needed (FLATULENCE)., Disp: , Rfl:     amLODIPine (NORVASC) 5 MG tablet, Take 1 tablet by mouth Daily., Disp: 90 tablet, Rfl: 1    benzonatate (TESSALON) 200 MG capsule, Take 1 capsule by mouth 3 (Three) Times a Day As Needed for Cough., Disp: 20 capsule, Rfl: 3    bisoprolol (ZEBeta) 10 MG tablet, Take 1 tablet by mouth Daily., Disp: 90 tablet, Rfl: 1    clobetasol (TEMOVATE) 0.05 % ointment, Apply 1 application  topically to the appropriate area as directed 3 (Three) Times a Day As Needed. As needed, Disp: , Rfl:     dicyclomine (BENTYL) 10 MG capsule, Take 1 capsule by mouth 4 (Four) Times a Day Before Meals & at Bedtime., Disp: , Rfl:     estradiol (ESTRACE) 0.1 MG/GM vaginal cream, Insert 1 applicator into the vagina 3 (Three) Times a Week if Needed (VAGINAL  ATROPHY)., Disp: , Rfl:     fenofibrate micronized (LOFIBRA) 134 MG capsule, TAKE 1 CAPSULE BY MOUTH DAILY, Disp: 90 capsule, Rfl: 0    fluticasone (FLONASE) 50 MCG/ACT nasal spray, 2 sprays into the nostril(s) as directed by provider Daily As Needed for Rhinitis or Allergies., Disp: , Rfl:     glucose blood (OneTouch Verio) test strip, USE TO TEST BLOOD SUGARS DAILY. DX CODE E11.65, Disp: 100 each, Rfl: 3    losartan (COZAAR) 100 MG tablet, Take 1 tablet by mouth Daily., Disp: 90 tablet, Rfl: 1    meloxicam (MOBIC) 15 MG tablet, TAKE 1 TABLET BY MOUTH DAILY, Disp: 30 tablet, Rfl: 0    metFORMIN (GLUCOPHAGE) 1000 MG tablet, TAKE 1/2 TABLET BY MOUTH TWICE DAILY WITH MEALS, Disp: 90 tablet, Rfl: 3    methocarbamol (ROBAXIN) 750 MG tablet, Take 1 tablet by mouth 3 (Three) Times a Day., Disp: 90 tablet, Rfl: 1    Mounjaro 7.5 MG/0.5ML solution pen-injector, 1 (One) Time Per Week., Disp: , Rfl:     olopatadine (PATADAY) 0.2 % solution ophthalmic solution, Administer 1 drop to both eyes Daily., Disp: , Rfl:     pancrelipase, Lip-Prot-Amyl, (CREON) 98318-16108 units capsule delayed-release particles capsule, Take 3 capsules by mouth 3 (Three) Times a Day With Meals., Disp: , Rfl:     sertraline (ZOLOFT) 100 MG tablet, Take 1 tablet by mouth Daily., Disp: 90 tablet, Rfl: 1    simvastatin (ZOCOR) 40 MG tablet, Take 1 tablet by mouth Daily., Disp: 90 tablet, Rfl: 1    vitamin B-12 (CYANOCOBALAMIN) 1000 MCG tablet, Take 1 tablet by mouth Daily., Disp: , Rfl:     oxybutynin XL (Ditropan XL) 10 MG 24 hr tablet, Take 1 tablet by mouth Daily for 90 days., Disp: 30 tablet, Rfl: 2    Allergies:  Allergies   Allergen Reactions    Diflucan [Fluconazole] Headache    Azithromycin GI Intolerance    Codeine Itching    Hydrocodone-Acetaminophen Hives    Latex Itching    Penicillins Hives    Quinapril Other (See Comments)     cough       Social History:  Social History     Socioeconomic History    Marital status:    Tobacco Use     "Smoking status: Never     Passive exposure: Yes    Smokeless tobacco: Never   Vaping Use    Vaping Use: Never used   Substance and Sexual Activity    Alcohol use: Not Currently     Comment: rarely    Drug use: Not Currently    Sexual activity: Yes     Partners: Male     Birth control/protection: Post-menopausal, Tubal ligation, Bilateral salpingectomy        Family History:  Family History   Problem Relation Age of Onset    Diabetes Mother     Hypertension Mother     Cancer Mother         Pancreatic    Diabetes Father     Hypertension Father     Hypertension Sister     Thyroid disease Sister     Hypertension Brother          Post void residual bladder scan:    10 mL     Objective     Physical Exam:   Vital Signs:   Vitals:    11/29/23 1051   BP: 124/72   Pulse: 67   SpO2: 98%   Weight: 80.7 kg (178 lb)   Height: 157.5 cm (62.01\")   PainSc: 0-No pain     Body mass index is 32.55 kg/m².     Physical Exam  Vitals and nursing note reviewed. Exam conducted with a chaperone present.   Constitutional:       General: She is awake. She is not in acute distress.     Appearance: Normal appearance. She is obese.   HENT:      Head: Normocephalic and atraumatic.      Right Ear: External ear normal.      Left Ear: External ear normal.      Nose: Nose normal.   Eyes:      Conjunctiva/sclera: Conjunctivae normal.   Pulmonary:      Effort: Pulmonary effort is normal. No respiratory distress.   Abdominal:      General: Abdomen is flat. There is no distension.      Palpations: Abdomen is soft. There is no mass.      Tenderness: There is no abdominal tenderness. There is no right CVA tenderness, left CVA tenderness, guarding or rebound.      Hernia: No hernia is present.   Genitourinary:     General: Normal vulva.      Exam position: Lithotomy position.      Comments: Atrophic vaginitis.    Mild vaginal/posterior prolapse    No demonstrable ANDER   Skin:     General: Skin is warm.   Neurological:      General: No focal deficit present.    "   Mental Status: She is alert and oriented to person, place, and time.      Gait: Gait normal.   Psychiatric:         Behavior: Behavior normal. Behavior is cooperative.         Thought Content: Thought content normal.         Judgment: Judgment normal.         Labs:   Brief Urine Lab Results  (Last result in the past 365 days)        Color   Clarity   Blood   Leuk Est   Nitrite   Protein   CREAT   Urine HCG        02/10/23 0915             77.0                      Lab Results   Component Value Date    GLUCOSE 147 (H) 02/10/2023    CALCIUM 10.2 02/10/2023     02/10/2023    K 4.6 02/10/2023    CO2 26.5 02/10/2023     02/10/2023    BUN 19 02/10/2023    CREATININE 0.87 02/10/2023    EGFRIFNONA 95 02/25/2022    BCR 21.8 02/10/2023    ANIONGAP 9.5 02/10/2023       Lab Results   Component Value Date    WBC 10.22 07/14/2021    HGB 14.7 07/14/2021    HCT 46.2 07/14/2021    MCV 85.7 07/14/2021     07/14/2021       Images:   XR Chest 2 View    Result Date: 11/20/2023  Impression: No acute cardiopulmonary abnormality. Electronically Signed: Baljit Reyna MD  11/20/2023 4:24 PM EST  Workstation ID: [id number]      Measures:   Tobacco:   Izabel Abreu  reports that she has never smoked. She has been exposed to tobacco smoke. She has never used smokeless tobacco..      Urine Incontinence: Patient reports that she is currently experiencing any symptoms of urinary incontinence.    10 mL     Assessment / Plan      Assessment:  Mrs. Abreu is a 68 y.o. female who presented today with lower urinary tract symptoms.  She has no demonstrable ANDER.  However, she has significant urge incontinence.  She also has bowel urgency and incontinece.  I will start her on Oxybutnin.  We discussed side effects and risks of the medication.  I provided her information on [employer] as well.    I will place a referral for pelvic floor PT as well as she did have some tightness of her levators.     Diagnoses and all orders for this  visit:    1. Incontinence of feces, unspecified fecal incontinence type (Primary)    2. Pelvic floor dysfunction in female  -     Ambulatory Referral to Physical Therapy Pelvic Floor    3. OAB (overactive bladder)  -     oxybutynin XL (Ditropan XL) 10 MG 24 hr tablet; Take 1 tablet by mouth Daily for 90 days.  Dispense: 30 tablet; Refill: 2        Follow Up:   Return in about 3 months (around 2/29/2024) for Recheck.      Christine Ryder MD  [facility]

## 2023-11-29 NOTE — PROGRESS NOTES
Patient Name: Izabel Abreu  : 1955   MRN: [id number]     Chief Complaint:    Chief Complaint   Patient presents with    Annual Exam    Asthma     She was prescribed an Advair inhaler but does not know if she needs it. She was told to get a pulmonary test    Balance Issues     She was wondering if any of her medicine may cause balance issues. She has fallen 3 or 4 times just this year    Urinary Incontinence     She is seeing Dr. Ryder today for it    Med Refill     Sertraline refill    Diabetes     Questions about Mounjaro. She hasn't seen much difference in her weight or glucose levels       History of Present Illness: Izabel Abreu is a 68 y.o. female who is here today for their annual health maintenance and physical.  Chronic medical conditions include hyperlipidemia, hypertension, diabetes, osteoarthritis, urinary incontinence, anxiety, allergic rhinitis, and lichen sclerosis.    She reports recently she saw a subspecialist who noticed that she was wheezing.  He recommended she get a PFT for further evaluation.  She has been seen since that visit and no wheezing has been noted.               Review of Systems:   Review of Systems   Constitutional:  Negative for chills, fatigue and fever.   Respiratory:  Positive for cough and wheezing. Negative for shortness of breath.    Cardiovascular:  Negative for chest pain and palpitations.   Gastrointestinal:  Negative for abdominal pain, constipation, diarrhea, nausea and vomiting.   Genitourinary:  Positive for frequency, urgency and urinary incontinence. Negative for dysuria and hematuria.   Musculoskeletal:  Positive for arthralgias.   Neurological:  Positive for dizziness. Negative for headache.   Psychiatric/Behavioral:  Negative for depressed mood. The patient is not nervous/anxious.        Past Medical History, Social History, Family History and Care Team were all reviewed with patient and updated as appropriate.     Medications:      Current Outpatient Medications:     acetaminophen (TYLENOL) 650 MG 8 hr tablet, Take 2 tablets by mouth Every 8 (Eight) Hours As Needed for Mild Pain., Disp: , Rfl:     Alpha-D-Galactosidase (Beano) tablet, Take 2 doses by mouth 3 (Three) Times a Day As Needed (FLATULENCE)., Disp: , Rfl:     amLODIPine (NORVASC) 5 MG tablet, Take 1 tablet by mouth Daily., Disp: 90 tablet, Rfl: 1    benzonatate (TESSALON) 200 MG capsule, Take 1 capsule by mouth 3 (Three) Times a Day As Needed for Cough., Disp: 20 capsule, Rfl: 3    bisoprolol (ZEBeta) 10 MG tablet, Take 1 tablet by mouth Daily., Disp: 90 tablet, Rfl: 1    clobetasol (TEMOVATE) 0.05 % ointment, Apply 1 application  topically to the appropriate area as directed 3 (Three) Times a Day As Needed. As needed, Disp: , Rfl:     dicyclomine (BENTYL) 10 MG capsule, Take 1 capsule by mouth 4 (Four) Times a Day Before Meals & at Bedtime., Disp: , Rfl:     estradiol (ESTRACE) 0.1 MG/GM vaginal cream, Insert 1 applicator into the vagina 3 (Three) Times a Week if Needed (VAGINAL ATROPHY)., Disp: , Rfl:     fenofibrate micronized (LOFIBRA) 134 MG capsule, TAKE 1 CAPSULE BY MOUTH DAILY, Disp: 90 capsule, Rfl: 0    fluticasone (FLONASE) 50 MCG/ACT nasal spray, 2 sprays into the nostril(s) as directed by provider Daily As Needed for Rhinitis or Allergies., Disp: , Rfl:     glucose blood (OneTouch Verio) test strip, USE TO TEST BLOOD SUGARS DAILY. DX CODE E11.65, Disp: 100 each, Rfl: 3    losartan (COZAAR) 100 MG tablet, Take 1 tablet by mouth Daily., Disp: 90 tablet, Rfl: 1    meloxicam (MOBIC) 15 MG tablet, TAKE 1 TABLET BY MOUTH DAILY, Disp: 30 tablet, Rfl: 0    metFORMIN (GLUCOPHAGE) 1000 MG tablet, TAKE 1/2 TABLET BY MOUTH TWICE DAILY WITH MEALS, Disp: 90 tablet, Rfl: 3    methocarbamol (ROBAXIN) 750 MG tablet, Take 1 tablet by mouth 3 (Three) Times a Day., Disp: 90 tablet, Rfl: 1    Mounjaro 7.5 MG/0.5ML solution pen-injector, 1 (One) Time Per Week., Disp: , Rfl:      olopatadine (PATADAY) 0.2 % solution ophthalmic solution, Administer 1 drop to both eyes Daily., Disp: , Rfl:     pancrelipase, Lip-Prot-Amyl, (CREON) 21212-71816 units capsule delayed-release particles capsule, Take 3 capsules by mouth 3 (Three) Times a Day With Meals., Disp: , Rfl:     sertraline (ZOLOFT) 100 MG tablet, Take 1 tablet by mouth Daily., Disp: 90 tablet, Rfl: 1    simvastatin (ZOCOR) 40 MG tablet, Take 1 tablet by mouth Daily., Disp: 90 tablet, Rfl: 1    vitamin B-12 (CYANOCOBALAMIN) 1000 MCG tablet, Take 1 tablet by mouth Daily., Disp: , Rfl:     oxybutynin XL (Ditropan XL) 10 MG 24 hr tablet, Take 1 tablet by mouth Daily for 90 days., Disp: 30 tablet, Rfl: 2    Allergies:   Allergies   Allergen Reactions    Diflucan [Fluconazole] Headache    Azithromycin GI Intolerance    Codeine Itching    Hydrocodone-Acetaminophen Hives    Latex Itching    Penicillins Hives    Quinapril Other (See Comments)     cough         Depression: PHQ-2 Depression Screening  PHQ-2 Total Score:     PHQ-9 Total Score:       Intimate partner violence: (Screen on initial visit, pregnant women, women with injuries, older adult with injury or evidence of neglect):  Violence can be a problem in many people's lives, so I now ask every patient about trauma or abuse they may have experienced in a relationship.  Stress/Safety - Do you feel safe in your relationship?  Afraid/Abused - Have you ever been in a relationship where you were threatened, hurt, or afraid?  Friend/Family - Are your friends aware you have been hurt?  Emergency Plan - Do you have a safe place to go and the resources you need in an emergency?    Osteoporosis:   Ost menopausal women < 65 with RF (advancing age, previous fracture, glucocorticoid therapy, parental hip fracture, low body weight, current cigarette smoking, excessive alcohol consumption, rheumatoid arthritis, secondary osteoporosis [hypogonadism/premature menopause, malabsorption, chronic liver disease,  "IBD]).  All women 65 or older      Physical Exam:  Vital Signs:   Vitals:    11/29/23 0800   BP: 136/88   BP Location: Left arm   Patient Position: Sitting   Cuff Size: Adult   Pulse: 72   Temp: 97.4 °F (36.3 °C)   TempSrc: Infrared   SpO2: 97%   Weight: 81 kg (178 lb 8 oz)   Height: 157.5 cm (62\")   PainSc:   4   PainLoc: Back     Body mass index is 32.65 kg/m².     Physical Exam  Vitals and nursing note reviewed.   Constitutional:       Appearance: Normal appearance. She is obese.   HENT:      Head: Normocephalic and atraumatic.      Right Ear: A middle ear effusion is present.      Left Ear: A middle ear effusion is present.      Nose: Nose normal.      Mouth/Throat:      Mouth: Mucous membranes are moist.      Pharynx: Oropharynx is clear.   Eyes:      Conjunctiva/sclera: Conjunctivae normal.      Pupils: Pupils are equal, round, and reactive to light.   Cardiovascular:      Rate and Rhythm: Normal rate and regular rhythm.      Heart sounds: Normal heart sounds. No murmur heard.  Pulmonary:      Effort: Pulmonary effort is normal.      Breath sounds: Normal breath sounds. No wheezing, rhonchi or rales.   Abdominal:      General: Bowel sounds are normal.      Palpations: Abdomen is soft.      Tenderness: There is no abdominal tenderness.   Musculoskeletal:      Cervical back: Neck supple.   Lymphadenopathy:      Cervical: No cervical adenopathy.   Skin:     General: Skin is warm.      Findings: No rash.   Neurological:      General: No focal deficit present.      Mental Status: She is alert and oriented to person, place, and time.      Motor: Weakness present.   Psychiatric:         Mood and Affect: Mood normal.         Behavior: Behavior normal.         Procedures      Assessment/Plan:   Diagnoses and all orders for this visit:    1. Encounter for wellness examination in adult (Primary)  Assessment & Plan:  Fasting labs, up-to-date on health maintenance    Orders:  -     Hemoglobin A1c; Future  -     CBC Auto " Differential; Future  -     Comprehensive Metabolic Panel; Future  -     Lipid Panel; Future  -     TSH; Future  -     T4, Free; Future  -     Hemoglobin A1c  -     CBC Auto Differential  -     Comprehensive Metabolic Panel  -     Lipid Panel  -     TSH  -     T4, Free    2. Wheezing  Assessment & Plan:  PFT ordered per patient request    Orders:  -     Complete PFT - Pre & Post Bronchodilator; Future    3. Pneumococcal vaccination administered at current visit  -     Pneumococcal Conjugate Vaccine 20-Valent (PCV20)    4. Vitamin D deficiency  -     Vitamin D,25-Hydroxy; Future  -     Vitamin D,25-Hydroxy    5. KELECHI (generalized anxiety disorder)  Assessment & Plan:  Psychological condition is improving with treatment.  Continue current treatment regimen.  Psychological condition  will be reassessed at the next regular appointment.    Orders:  -     sertraline (ZOLOFT) 100 MG tablet; Take 1 tablet by mouth Daily.  Dispense: 90 tablet; Refill: 1    6. Mixed stress and urge urinary incontinence  Assessment & Plan:  Worsening, has an appointment with urology to discuss pelvic floor therapy             Follow Up:   Return in about 6 months (around 5/29/2024) for Recheck with labs.    Healthcare Maintenance:   Counseling provided on Discussed injury prevention, diet and exercise, safe sexual practices, and screening for common diseases. Encouraged use of sunscreen and seatbelts. Encouraged SBE, avoidance of tobacco, limiting alcohol, and yearly dental and eye exams.   .   Izabel Abreu voices understanding and acceptance of this advice and will call back with any further questions or concerns. AVS with preventive healthcare tips printed for patient.     Destiny Eid DO  [facility]

## 2023-11-30 ENCOUNTER — TELEPHONE (OUTPATIENT)
Dept: UROLOGY | Facility: CLINIC | Age: 68
End: 2023-11-30
Payer: COMMERCIAL

## 2023-11-30 LAB
25(OH)D3+25(OH)D2 SERPL-MCNC: 19.2 NG/ML (ref 30–100)
ALBUMIN SERPL-MCNC: 4.4 G/DL (ref 3.9–4.9)
ALBUMIN/GLOB SERPL: 1.8 {RATIO} (ref 1.2–2.2)
ALP SERPL-CCNC: 59 IU/L (ref 44–121)
ALT SERPL-CCNC: 19 IU/L (ref 0–32)
AST SERPL-CCNC: 22 IU/L (ref 0–40)
BASOPHILS # BLD AUTO: 0.1 X10E3/UL (ref 0–0.2)
BASOPHILS NFR BLD AUTO: 2 %
BILIRUB SERPL-MCNC: 0.3 MG/DL (ref 0–1.2)
BUN SERPL-MCNC: 18 MG/DL (ref 8–27)
BUN/CREAT SERPL: 24 (ref 12–28)
CALCIUM SERPL-MCNC: 10.2 MG/DL (ref 8.7–10.3)
CHLORIDE SERPL-SCNC: 101 MMOL/L (ref 96–106)
CHOLEST SERPL-MCNC: 137 MG/DL (ref 100–199)
CO2 SERPL-SCNC: 22 MMOL/L (ref 20–29)
CREAT SERPL-MCNC: 0.74 MG/DL (ref 0.57–1)
EGFRCR SERPLBLD CKD-EPI 2021: 89 ML/MIN/1.73
EOSINOPHIL # BLD AUTO: 0.3 X10E3/UL (ref 0–0.4)
EOSINOPHIL NFR BLD AUTO: 3 %
ERYTHROCYTE [DISTWIDTH] IN BLOOD BY AUTOMATED COUNT: 15 % (ref 11.7–15.4)
GLOBULIN SER CALC-MCNC: 2.5 G/DL (ref 1.5–4.5)
GLUCOSE SERPL-MCNC: 145 MG/DL (ref 70–99)
HBA1C MFR BLD: 7.3 % (ref 4.8–5.6)
HCT VFR BLD AUTO: 40.6 % (ref 34–46.6)
HDLC SERPL-MCNC: 52 MG/DL
HGB BLD-MCNC: 13 G/DL (ref 11.1–15.9)
IMM GRANULOCYTES # BLD AUTO: 0 X10E3/UL (ref 0–0.1)
IMM GRANULOCYTES NFR BLD AUTO: 0 %
LDLC SERPL CALC-MCNC: 65 MG/DL (ref 0–99)
LYMPHOCYTES # BLD AUTO: 1.7 X10E3/UL (ref 0.7–3.1)
LYMPHOCYTES NFR BLD AUTO: 21 %
MCH RBC QN AUTO: 26.3 PG (ref 26.6–33)
MCHC RBC AUTO-ENTMCNC: 32 G/DL (ref 31.5–35.7)
MCV RBC AUTO: 82 FL (ref 79–97)
MONOCYTES # BLD AUTO: 0.7 X10E3/UL (ref 0.1–0.9)
MONOCYTES NFR BLD AUTO: 9 %
NEUTROPHILS # BLD AUTO: 5.4 X10E3/UL (ref 1.4–7)
NEUTROPHILS NFR BLD AUTO: 65 %
PLATELET # BLD AUTO: 354 X10E3/UL (ref 150–450)
POTASSIUM SERPL-SCNC: 4.8 MMOL/L (ref 3.5–5.2)
PROT SERPL-MCNC: 6.9 G/DL (ref 6–8.5)
RBC # BLD AUTO: 4.95 X10E6/UL (ref 3.77–5.28)
SODIUM SERPL-SCNC: 140 MMOL/L (ref 134–144)
T4 FREE SERPL-MCNC: 1.48 NG/DL (ref 0.82–1.77)
TRIGL SERPL-MCNC: 111 MG/DL (ref 0–149)
TSH SERPL DL<=0.005 MIU/L-ACNC: 0.73 UIU/ML (ref 0.45–4.5)
VLDLC SERPL CALC-MCNC: 20 MG/DL (ref 5–40)
WBC # BLD AUTO: 8.2 X10E3/UL (ref 3.4–10.8)

## 2023-11-30 NOTE — TELEPHONE ENCOUNTER
Called PT to notify her that she can take the medication when it is most convenient for her. I told her that if her symptoms are the most bothersome at night, she can take it before bed so she gets the most relief then. PT was worried about interactions with other meds when taken at night together, including Allegra allergy and dicyclomine but was in the process of contacting her other doctors regarding the interactions

## 2023-11-30 NOTE — TELEPHONE ENCOUNTER
Caller: VAISHALI HERRERA     Relationship: SELF     Best call back number: [phone]    Which medication are you concerned about: OXYBUTYNIN    Who prescribed you this medication: DR. LEYVA    When did you start taking this medication: YESTERDAY    What are your concerns: PT WANTS TO KNOW IF IT IS BEST TO TAKE THIS MEDICATION IN THE MORNING, AFTERNOON, OR AT NIGHT.

## 2023-12-08 ENCOUNTER — TELEPHONE (OUTPATIENT)
Dept: FAMILY MEDICINE CLINIC | Facility: CLINIC | Age: 68
End: 2023-12-08

## 2023-12-08 NOTE — TELEPHONE ENCOUNTER
Caller: Izabel Abreu    Relationship: Self    Best call back number: [phone]     Caller requesting test results    What test was performed: LAB WORK     When was the test performed: 11.29.23    Where was the test performed: IN OFFICE     Additional notes: PATIENT SENT A [employer] MESSAGE ON 11.6.23.     PATIENT IS WANTING TO KNOW IF ITS POSSIBLE TO GET A REACTION TO THE PNEUMONIA SHOT AS SHE STILL HAS A RED SPOT WHERE SHE RECEIVED HERS.     PLEASE CALL PATIENT BACK, IF NO ANSWER LEAVE A DETAILED MESSAGE.

## 2023-12-12 RX ORDER — CHOLECALCIFEROL (VITAMIN D3) 1250 MCG
50000 CAPSULE ORAL
Qty: 12 CAPSULE | Refills: 1 | Status: SHIPPED | OUTPATIENT
Start: 2023-12-12

## 2023-12-26 ENCOUNTER — TELEPHONE (OUTPATIENT)
Dept: FAMILY MEDICINE CLINIC | Facility: CLINIC | Age: 68
End: 2023-12-26
Payer: COMMERCIAL

## 2023-12-26 NOTE — TELEPHONE ENCOUNTER
Pt called wanting an appt didn't have anything with any provider until 1/2 told pt we had a walk in clinic open 12/30  
normal

## 2023-12-29 ENCOUNTER — TELEPHONE (OUTPATIENT)
Dept: UROLOGY | Facility: CLINIC | Age: 68
End: 2023-12-29

## 2023-12-29 ENCOUNTER — TELEPHONE (OUTPATIENT)
Dept: UROLOGY | Facility: CLINIC | Age: 68
End: 2023-12-29
Payer: COMMERCIAL

## 2023-12-29 NOTE — TELEPHONE ENCOUNTER
Patient called to let  know that since she has started the oxybutynin she has had constant sleepless nights and didn't know if that was a side effect but was wondering if so if she could try something else for her overactive bladder and she also stated that she hasn't been able to take any allergic medication because of the interactions and was wondering if they knew of any allergy medication she could take with the oxybutynin. I let her know that  is out today but I was going to send it to our other doctor and let her know what they said. She stated understanding

## 2023-12-29 NOTE — TELEPHONE ENCOUNTER
Caller: Izabel Abreu    Relationship: Self    Best call back number: [phone]    What is the best time to reach you: ANY    Who are you requesting to speak with (clinical staff, provider,  specific staff member): CLINICAL    Do you know the name of the person who called: JOSE FRANCISCO     What was the call regarding: MEDICATION OXYBUTIN    Is it okay if the provider responds through MyChart: NA

## 2024-01-15 RX ORDER — FENOFIBRATE 134 MG/1
134 CAPSULE ORAL DAILY
Qty: 90 CAPSULE | Refills: 0 | Status: SHIPPED | OUTPATIENT
Start: 2024-01-15

## 2024-01-15 NOTE — TELEPHONE ENCOUNTER
Rx Refill Note  Requested Prescriptions     Pending Prescriptions Disp Refills    fenofibrate micronized (LOFIBRA) 134 MG capsule [Pharmacy Med Name: FENOFIBRATE 134MG CAPSULES] 90 capsule 0     Sig: TAKE 1 CAPSULE BY MOUTH DAILY          Last office visit with prescribing clinician: 9/29/2023     Next office visit with prescribing clinician: 3/29/2024         Harleen Mckinney MA  01/15/24, 11:03 EST

## 2024-02-07 RX ORDER — FENOFIBRATE 134 MG/1
134 CAPSULE ORAL DAILY
Qty: 90 CAPSULE | Refills: 0 | OUTPATIENT
Start: 2024-02-07

## 2024-02-07 NOTE — TELEPHONE ENCOUNTER
Rx Refill Note  Requested Prescriptions     Pending Prescriptions Disp Refills    fenofibrate micronized (LOFIBRA) 134 MG capsule [Pharmacy Med Name: FENOFIBRATE 134MG CAPSULES] 90 capsule 0     Sig: TAKE 1 CAPSULE BY MOUTH DAILY          Last office visit with prescribing clinician: 9/29/2023     Next office visit with prescribing clinician: 3/29/2024         Harleen Mckinney MA  02/07/24, 09:22 EST

## 2024-02-09 ENCOUNTER — TELEPHONE (OUTPATIENT)
Dept: FAMILY MEDICINE CLINIC | Facility: CLINIC | Age: 69
End: 2024-02-09
Payer: COMMERCIAL

## 2024-02-09 DIAGNOSIS — R06.2 WHEEZING: Primary | ICD-10-CM

## 2024-02-09 NOTE — TELEPHONE ENCOUNTER
[facility] calling to request an order for a PFT be put in. Pt is at hospital now. Fax number is [phone].

## 2024-02-12 ENCOUNTER — TELEPHONE (OUTPATIENT)
Dept: FAMILY MEDICINE CLINIC | Facility: CLINIC | Age: 69
End: 2024-02-12
Payer: COMMERCIAL

## 2024-02-19 RX ORDER — BISOPROLOL FUMARATE 10 MG/1
10 TABLET, FILM COATED ORAL DAILY
Qty: 90 TABLET | Refills: 1 | Status: SHIPPED | OUTPATIENT
Start: 2024-02-19

## 2024-02-19 RX ORDER — BISOPROLOL FUMARATE 10 MG/1
10 TABLET, FILM COATED ORAL DAILY
Qty: 90 TABLET | Refills: 1 | OUTPATIENT
Start: 2024-02-19

## 2024-02-19 NOTE — TELEPHONE ENCOUNTER
Rx Refill Note  Requested Prescriptions     Pending Prescriptions Disp Refills    bisoprolol (ZEBeta) 10 MG tablet [Pharmacy Med Name: BISOPROLOL FUMARATE 10MG TABLETS] 90 tablet 1     Sig: TAKE 1 TABLET BY MOUTH DAILY          Last office visit with prescribing clinician: 9/29/2023     Next office visit with prescribing clinician: 2/18/2024         Harleen Mckinney MA  02/19/24, 13:51 EST

## 2024-02-19 NOTE — TELEPHONE ENCOUNTER
Rx Refill Note  Requested Prescriptions     Pending Prescriptions Disp Refills    bisoprolol (ZEBeta) 10 MG tablet 90 tablet 1     Sig: Take 1 tablet by mouth Daily.          Last office visit with prescribing clinician: 9/29/2023     Next office visit with prescribing clinician: 3/29/2024         Harleen Mckinney MA  02/19/24, 13:50 EST

## 2024-02-27 DIAGNOSIS — N32.81 OAB (OVERACTIVE BLADDER): ICD-10-CM

## 2024-02-27 RX ORDER — OXYBUTYNIN CHLORIDE 10 MG/1
10 TABLET, EXTENDED RELEASE ORAL DAILY
Qty: 30 TABLET | Refills: 2 | Status: SHIPPED | OUTPATIENT
Start: 2024-02-27 | End: 2024-05-27

## 2024-02-27 NOTE — TELEPHONE ENCOUNTER
Caller: VAISHALI    Relationship: SELF    Best call back number: [phone]     Requested Prescriptions: OXYBUTYNIN  Requested Prescriptions      No prescriptions requested or ordered in this encounter        Pharmacy where request should be sent:  [facility]    Last office visit with prescribing clinician: 11/29/2023     Next office visit with prescribing clinician: 3/22/2024     Additional details provided by patient: PT WAS ILL FOR HER 2/28 APT    Does the patient have less than a 3 day supply:  [x] Yes  [] No    Would you like a call back once the refill request has been completed: [x] Yes [] No    If the office needs to give you a call back, can they leave a voicemail: [x] Yes [] No    Claudine Kessler Rep   02/27/24 10:56 EST

## 2024-03-05 NOTE — TELEPHONE ENCOUNTER
Rx Refill Note  Requested Prescriptions     Pending Prescriptions Disp Refills    metFORMIN (GLUCOPHAGE) 1000 MG tablet [Pharmacy Med Name: METFORMIN 1000MG TABLETS] 90 tablet 3     Sig: TAKE 1/2 TABLET BY MOUTH TWICE DAILY WITH MEALS          Last office visit with prescribing clinician: 9/29/2023     Next office visit with prescribing clinician: 3/29/2024         Harleen Mckinney MA  03/05/24, 13:45 EST

## 2024-03-07 ENCOUNTER — TELEPHONE (OUTPATIENT)
Dept: ENDOCRINOLOGY | Facility: CLINIC | Age: 69
End: 2024-03-07
Payer: COMMERCIAL

## 2024-03-07 NOTE — TELEPHONE ENCOUNTER
[facility] CALLED [facility] THEY ACCIDENTALLY DELETED THIS PT'S METFORMIN RX. THEY REQUESTED WE RESEND THE RX.

## 2024-03-07 NOTE — TELEPHONE ENCOUNTER
Rx Refill Note  Requested Prescriptions      No prescriptions requested or ordered in this encounter        Last office visit with prescribing clinician: 9/29/2023      Next office visit with prescribing clinician: 3/29/2024       Lyla Taylor (Jodi)  03/07/24, 11:35 EST

## 2024-03-22 ENCOUNTER — OFFICE VISIT (OUTPATIENT)
Dept: UROLOGY | Facility: CLINIC | Age: 69
End: 2024-03-22
Payer: COMMERCIAL

## 2024-03-22 VITALS
SYSTOLIC BLOOD PRESSURE: 124 MMHG | BODY MASS INDEX: 32.76 KG/M2 | OXYGEN SATURATION: 98 % | WEIGHT: 178 LBS | DIASTOLIC BLOOD PRESSURE: 82 MMHG | HEIGHT: 62 IN | HEART RATE: 77 BPM

## 2024-03-22 DIAGNOSIS — N32.81 OAB (OVERACTIVE BLADDER): Primary | ICD-10-CM

## 2024-03-22 LAB
BILIRUB BLD-MCNC: NEGATIVE MG/DL
CLARITY, POC: CLEAR
COLOR UR: YELLOW
EXPIRATION DATE: NORMAL
GLUCOSE UR STRIP-MCNC: NEGATIVE MG/DL
KETONES UR QL: NEGATIVE
LEUKOCYTE EST, POC: NEGATIVE
Lab: NORMAL
NITRITE UR-MCNC: NEGATIVE MG/ML
PH UR: 7 [PH] (ref 5–8)
PROT UR STRIP-MCNC: NEGATIVE MG/DL
RBC # UR STRIP: NEGATIVE /UL
SP GR UR: 1.01 (ref 1–1.03)
UROBILINOGEN UR QL: NORMAL

## 2024-03-22 NOTE — PROGRESS NOTES
Follow Up Office Visit      Patient Name: Izabel Abreu  : 1955   MRN: [id number]     Chief Complaint:    Chief Complaint   Patient presents with    Incontinence of feces, unspecified fecal incontinence type       Referring Provider: No ref. provider found    History of Present Illness: Izabel Abreu is a 68 y.o. female who presents today for follow up of urge urinary incontinence.  She reports she is doing well on Oxybutynin.  She is happier.  No dysuria or gross hematuria.    She reports her fecal incontinence has improved.        Subjective      Review of System:   Review of Systems   Constitutional: Negative.    HENT: Negative.     Eyes: Negative.    Respiratory: Negative.     Cardiovascular: Negative.    Gastrointestinal: Negative.    Endocrine: Negative.    Genitourinary: Negative.    Musculoskeletal: Negative.    Skin: Negative.    Allergic/Immunologic: Negative.    Neurological: Negative.    Hematological: Negative.    Psychiatric/Behavioral: Negative.        I have reviewed the ROS documented by my clinical staff, I have updated appropriately and I agree. Christine Ryder MD    I have reviewed and the following portions of the patient's history were updated as appropriate: past family history, past medical history, past social history, past surgical history and problem list.    Past Medical History:   Past Medical History:   Diagnosis Date    Allergic     Anxiety     Arthritis     Asthma     Colon polyp     Removed 6 polys 1 normat others stage 2    Diabetes mellitus     Diverticulosis     Elevated cholesterol     Exocrine pancreatic insufficiency     Flatulence     Heart murmur     HL (hearing loss) -    Need hearing aides    Hypercalcemia     Hyperglycemia     Hyperparathyroidism     Hypertension     Irritable bowel syndrome     Sleep apnea     CPAP 7/14CM    Subclinical hyperthyroidism     Type 2 diabetes mellitus     Vitamin D deficiency        Past Surgical  History:  Past Surgical History:   Procedure Laterality Date     SECTION      COLONOSCOPY      SCHEDULED FOR     D & C AND LAPAROSCOPY      OVARY SURGERY      and tubes    PARATHYROIDECTOMY N/A 2021    Procedure: PARATHYROIDECTOMY;  Surgeon: Apolinar Suh MD;  Location: [facility];  Service: General;  Laterality: N/A;    SHOULDER SURGERY      shoulder joint surgery-Abstracted from [place]     SINUS SURGERY      TOE SURGERY      TUBAL ABDOMINAL LIGATION Bilateral        Family History:   family history includes Cancer in her mother; Diabetes in her father and mother; Hypertension in her brother, father, mother, and sister; Thyroid disease in her sister.   Otherwise pertinent FHx was reviewed and not pertinent to current issue.    Social History:    reports that she has never smoked. She has been exposed to tobacco smoke. She has never used smokeless tobacco. She reports that she does not currently use alcohol. She reports that she does not currently use drugs.    Medications:     Current Outpatient Medications:     acetaminophen (TYLENOL) 650 MG 8 hr tablet, Take 2 tablets by mouth Every 8 (Eight) Hours As Needed for Mild Pain., Disp: , Rfl:     Alpha-D-Galactosidase (Beano) tablet, Take 2 doses by mouth 3 (Three) Times a Day As Needed (FLATULENCE)., Disp: , Rfl:     amLODIPine (NORVASC) 5 MG tablet, Take 1 tablet by mouth Daily., Disp: 90 tablet, Rfl: 1    bisoprolol (ZEBeta) 10 MG tablet, Take 1 tablet by mouth Daily., Disp: 90 tablet, Rfl: 1    Cholecalciferol (Vitamin D3) 1.25 MG (21221 UT) capsule, Take 1 capsule by mouth Every 7 (Seven) Days., Disp: 12 capsule, Rfl: 1    clobetasol (TEMOVATE) 0.05 % ointment, Apply 1 Application topically to the appropriate area as directed 3 (Three) Times a Day As Needed. As needed, Disp: , Rfl:     dicyclomine (BENTYL) 10 MG capsule, Take 1 capsule by mouth 4 (Four) Times a Day Before Meals & at Bedtime., Disp: , Rfl:     estradiol (ESTRACE) 0.1 MG/GM  vaginal cream, Insert 1 g into the vagina 3 (Three) Times a Week if Needed (VAGINAL ATROPHY)., Disp: , Rfl:     fenofibrate micronized (LOFIBRA) 134 MG capsule, TAKE 1 CAPSULE BY MOUTH DAILY, Disp: 90 capsule, Rfl: 0    fluticasone (FLONASE) 50 MCG/ACT nasal spray, 2 sprays into the nostril(s) as directed by provider Daily As Needed for Rhinitis or Allergies., Disp: , Rfl:     glucose blood (OneTouch Verio) test strip, USE TO TEST BLOOD SUGARS DAILY. DX CODE E11.65, Disp: 100 each, Rfl: 3    losartan (COZAAR) 100 MG tablet, Take 1 tablet by mouth Daily., Disp: 90 tablet, Rfl: 1    meloxicam (MOBIC) 15 MG tablet, TAKE 1 TABLET BY MOUTH DAILY, Disp: 30 tablet, Rfl: 0    metFORMIN (GLUCOPHAGE) 1000 MG tablet, TAKE 1/2 TABLET TWICE A DAY, Disp: 90 tablet, Rfl: 3    methocarbamol (ROBAXIN) 750 MG tablet, Take 1 tablet by mouth 3 (Three) Times a Day., Disp: 90 tablet, Rfl: 1    Mounjaro 7.5 MG/0.5ML solution pen-injector, 1 (One) Time Per Week., Disp: , Rfl:     olopatadine (PATADAY) 0.2 % solution ophthalmic solution, Administer 1 drop to both eyes Daily., Disp: , Rfl:     oxybutynin XL (Ditropan XL) 10 MG 24 hr tablet, Take 1 tablet by mouth Daily for 90 days., Disp: 30 tablet, Rfl: 2    pancrelipase, Lip-Prot-Amyl, (CREON) 91645-19307 units capsule delayed-release particles capsule, Take 3 capsules by mouth 3 (Three) Times a Day With Meals., Disp: , Rfl:     sertraline (ZOLOFT) 100 MG tablet, Take 1 tablet by mouth Daily., Disp: 90 tablet, Rfl: 1    simvastatin (ZOCOR) 40 MG tablet, Take 1 tablet by mouth Daily., Disp: 90 tablet, Rfl: 1    vitamin B-12 (CYANOCOBALAMIN) 1000 MCG tablet, Take 1 tablet by mouth Daily., Disp: , Rfl:     benzonatate (TESSALON) 200 MG capsule, Take 1 capsule by mouth 3 (Three) Times a Day As Needed for Cough. (Patient not taking: Reported on 3/22/2024), Disp: 20 capsule, Rfl: 3    Allergies:   Allergies   Allergen Reactions    Diflucan [Fluconazole] Headache    Azithromycin GI Intolerance     "Codeine Itching    Hydrocodone-Acetaminophen Hives    Latex Itching    Penicillins Hives    Quinapril Other (See Comments)     cough       IPSS Questionnaire (AUA-7):Bladder & Bowel Symptom Questionnaire    How often do you usually urinate during the day ?   1 - About every 3-4 hours   2.   How many timed do you urinate at night?   1 - 2 times at night   3.   What is the reason that you usually urinate?   2 - Moderate urge (can delay 10-60 min)   4.   Once you get the urge to go, how long can you     comfortably delay?   2 - 10-30 min   5.   How often do you get a sudden urge that makes you rush to the bathroom?   3 - A few times a week   6.   How often does a sudden urge to urinate result in you leaking urine or wetting pads?   2 - A few times a month   7.  In your opinion, how good is your bladder control?   2 - Good   8.  Do you have accidental bowel leakage?   yes   9.  Do you have difficulty fully emptying your bladder?   no   10.  Do you experience accidental leakage when performing some physical activity such as coughing, sneezing, laughing or exercise?   yes   11. Have you tried medications to help improve your symptoms?   yes   12. Would you be interested in learning about a long-lasting option that may help you with your symptoms?   yes                                                                             Total Score   13       Objective     Physical Exam:   Vital Signs:   Vitals:    03/22/24 1051   BP: 124/82   Pulse: 77   SpO2: 98%   Weight: 80.7 kg (178 lb)   Height: 157.5 cm (62.01\")   PainSc: 0-No pain     Body mass index is 32.55 kg/m².     Physical Exam  Vitals and nursing note reviewed. Exam conducted with a chaperone present.   Constitutional:       General: She is awake. She is not in acute distress.     Appearance: Normal appearance.   HENT:      Head: Normocephalic and atraumatic.      Right Ear: External ear normal.      Left Ear: External ear normal.      Nose: Nose normal.   Eyes:      " "Conjunctiva/sclera: Conjunctivae normal.   Pulmonary:      Effort: Pulmonary effort is normal. No respiratory distress.   Abdominal:      General: Abdomen is flat. There is no distension.      Palpations: Abdomen is soft. There is no mass.      Tenderness: There is no abdominal tenderness. There is no right CVA tenderness, left CVA tenderness, guarding or rebound.      Hernia: No hernia is present.   Genitourinary:     Exam position: Lithotomy position.   Skin:     General: Skin is warm.   Neurological:      General: No focal deficit present.      Mental Status: She is alert and oriented to person, place, and time.      Gait: Gait normal.   Psychiatric:         Behavior: Behavior normal. Behavior is cooperative.         Thought Content: Thought content normal.         Judgment: Judgment normal.         Labs:   Brief Urine Lab Results       None                 Lab Results   Component Value Date    GLUCOSE 145 (H) 11/29/2023    CALCIUM 10.2 11/29/2023     11/29/2023    K 4.8 11/29/2023    CO2 22 11/29/2023     11/29/2023    BUN 18 11/29/2023    CREATININE 0.74 11/29/2023    EGFRIFNONA 95 02/25/2022    BCR 24 11/29/2023    ANIONGAP 9.5 02/10/2023       Lab Results   Component Value Date    WBC 8.2 11/29/2023    HGB 13.0 11/29/2023    HCT 40.6 11/29/2023    MCV 82 11/29/2023     11/29/2023       No results found for: \"PSA\"    Images:   No Images in the past 120 days found..    Measures:   Tobacco:   Izabel Abreu  reports that she has never smoked. She has been exposed to tobacco smoke. She has never used smokeless tobacco.      Urine Incontinence: Patient reports that she is not currently experiencing any symptoms of urinary incontinence.         Assessment / Plan      Assessment/Plan:   68 y.o. female who presented today for follow up of OAB and fecal incontinence.  She has done well on oxybutynin.  However, she reports she still has trouble even getting to [place] without leaking.  She " watched videos regarding [facility] and interested.  I discussed PNE and risks/benefits.  We will have her scheduled at her convenience.     Diagnoses and all orders for this visit:    1. OAB (overactive bladder) (Primary)  -     External Facility Surgical/Procedural Request; Future         Follow Up:   Return for Follow up after procedure.    I spent approximately 30 minutes providing clinical care for this patient; including review of patient's chart and provider documentation, face to face time spent with patient in examination room (obtaining history, performing physical exam, discussing diagnosis and management options), placing orders, and completing patient documentation.     Christine Ryder MD  [facility] [place]

## 2024-03-29 ENCOUNTER — OFFICE VISIT (OUTPATIENT)
Dept: ENDOCRINOLOGY | Facility: CLINIC | Age: 69
End: 2024-03-29
Payer: COMMERCIAL

## 2024-03-29 VITALS
OXYGEN SATURATION: 94 % | SYSTOLIC BLOOD PRESSURE: 120 MMHG | BODY MASS INDEX: 32.2 KG/M2 | HEIGHT: 62 IN | HEART RATE: 74 BPM | DIASTOLIC BLOOD PRESSURE: 73 MMHG | WEIGHT: 175 LBS

## 2024-03-29 DIAGNOSIS — E11.65 UNCONTROLLED TYPE 2 DIABETES MELLITUS WITH HYPERGLYCEMIA: Primary | ICD-10-CM

## 2024-03-29 LAB
EXPIRATION DATE: ABNORMAL
EXPIRATION DATE: ABNORMAL
GLUCOSE BLDC GLUCOMTR-MCNC: 134 MG/DL (ref 70–130)
HBA1C MFR BLD: 7 % (ref 4.5–5.7)
Lab: ABNORMAL
Lab: ABNORMAL

## 2024-03-29 RX ORDER — LOSARTAN POTASSIUM 100 MG/1
100 TABLET ORAL DAILY
Qty: 90 TABLET | Refills: 1 | Status: SHIPPED | OUTPATIENT
Start: 2024-03-29

## 2024-03-29 NOTE — PROGRESS NOTES
"     Office Note      Date: 2024  Patient Name: Izabel Abreu  MRN: [id number]  : 1955    Chief Complaint   Patient presents with    Diabetes       History of Present Illness:   Izabel Abreu is a 68 y.o. female who presents for Diabetes  .   Current rx: mounjaro and metformin     Changes in history: she tolerates this medication ok   Questions /problems: she is seeing urology about urinary incontinence.. they are trying anoxis therapy     Subjective          Review of Systems:   Review of Systems   Genitourinary:  Negative for dyspareunia and urgency.       The following portions of the patient's history were reviewed and updated as appropriate: allergies, current medications, past family history, past medical history, past social history, past surgical history, and problem list.    Objective     Visit Vitals  /73   Pulse 74   Ht 157.5 cm (62\")   Wt 79.4 kg (175 lb)   SpO2 94%   BMI 32.01 kg/m²           Physical Exam:  Physical Exam  Vitals reviewed.   Neurological:      Mental Status: She is alert.   Psychiatric:         Mood and Affect: Mood normal.         Behavior: Behavior normal.         Thought Content: Thought content normal.         Judgment: Judgment normal.         Assessment / Plan      Assessment & Plan:  Problem List Items Addressed This Visit          Other    Uncontrolled type 2 diabetes mellitus with hyperglycemia - Primary    Overview     Intolerant of saad  Intolerant on sglt2          Current Assessment & Plan     A1c of 7/0 is perfect         Relevant Medications    Mounjaro 7.5 MG/0.5ML solution pen-injector    metFORMIN (GLUCOPHAGE) 1000 MG tablet    Other Relevant Orders    POC Glucose, Blood (Completed)    POC Glycosylated Hemoglobin (Hb A1C) (Completed)        Electronically signed by : Dez Sánchez MD   2024    "

## 2024-04-02 ENCOUNTER — TELEPHONE (OUTPATIENT)
Dept: FAMILY MEDICINE CLINIC | Facility: CLINIC | Age: 69
End: 2024-04-02
Payer: COMMERCIAL

## 2024-04-02 RX ORDER — PROMETHAZINE HYDROCHLORIDE 25 MG/1
25 TABLET ORAL EVERY 8 HOURS PRN
COMMUNITY
End: 2024-04-02 | Stop reason: SDUPTHER

## 2024-04-02 RX ORDER — PROMETHAZINE HYDROCHLORIDE 25 MG/1
25 TABLET ORAL EVERY 8 HOURS PRN
Qty: 30 TABLET | Refills: 0 | Status: SHIPPED | OUTPATIENT
Start: 2024-04-02

## 2024-04-02 NOTE — TELEPHONE ENCOUNTER
patient reports diarrhea and vomiting since yesterday, patient is requesting phenergan, is this ok to send in 25 mg, every 8 hours # 30 no refills? Per Dr. Eid ok to send in the above medication.

## 2024-04-02 NOTE — TELEPHONE ENCOUNTER
Caller: Izabel Abreu    Relationship: Self    Best call back number: [phone]     What medication are you requesting: PHENERGAN OR SOMETHING FOR VOMITING.    What are your current symptoms: VOMITING    How long have you been experiencing symptoms: 04-01-24    Have you had these symptoms before:    [x] Yes  [] No    Have you been treated for these symptoms before:   [x] Yes  [] No    If a prescription is needed, what is your preferred pharmacy and phone number: [id number] [place], [place] - [address] AT [facility] [address] - [phone]  - [phone]      Additional notes:PT GOT SICK LEAVING THE OFFICE YESTERDAY WHEN SHE GOT HOME BEGAN WITH DIARRHEA.

## 2024-04-04 ENCOUNTER — TELEPHONE (OUTPATIENT)
Dept: UROLOGY | Facility: CLINIC | Age: 69
End: 2024-04-04
Payer: COMMERCIAL

## 2024-04-04 NOTE — TELEPHONE ENCOUNTER
Caller: VAISHALI    Relationship: SELF    Best call back number: [phone]    What form or medical record are you requesting: PROG NOTE 22 MAR 24    Who is requesting this form or medical record from you: DR XI PHAM [facility]    How would you like to receive the form or medical records (pick-up, mail, fax): FAX  If fax, what is the fax number:  [phone]    Timeframe paperwork needed: ASAP    Additional notes: NEED PROG NOTE WHERE YOU RECOMMENDED [facility]ER[facility]

## 2024-04-08 RX ORDER — SIMVASTATIN 40 MG
40 TABLET ORAL DAILY
Qty: 90 TABLET | Refills: 1 | Status: SHIPPED | OUTPATIENT
Start: 2024-04-08

## 2024-04-08 RX ORDER — FENOFIBRATE 134 MG/1
134 CAPSULE ORAL DAILY
Qty: 90 CAPSULE | Refills: 1 | Status: SHIPPED | OUTPATIENT
Start: 2024-04-08

## 2024-04-08 RX ORDER — AMLODIPINE BESYLATE 5 MG/1
5 TABLET ORAL DAILY
Qty: 90 TABLET | Refills: 1 | Status: SHIPPED | OUTPATIENT
Start: 2024-04-08

## 2024-04-08 NOTE — TELEPHONE ENCOUNTER
Rx Refill Note  Requested Prescriptions     Pending Prescriptions Disp Refills    simvastatin (ZOCOR) 40 MG tablet [Pharmacy Med Name: SIMVASTATIN 40MG TABLETS] 90 tablet 1     Sig: TAKE 1 TABLET BY MOUTH DAILY    amLODIPine (NORVASC) 5 MG tablet [Pharmacy Med Name: AMLODIPINE BESYLATE 5MG TABLETS] 90 tablet 1     Sig: TAKE 1 TABLET BY MOUTH DAILY    fenofibrate micronized (LOFIBRA) 134 MG capsule [Pharmacy Med Name: FENOFIBRATE 134MG CAPSULES] 90 capsule 0     Sig: TAKE 1 CAPSULE BY MOUTH DAILY        Last office visit with prescribing clinician: 3/29/2024      Next office visit with prescribing clinician: Visit date not found       Lyla Taylor (Jodi)  04/08/24, 11:24 EDT

## 2024-04-10 RX ORDER — SEMAGLUTIDE 0.68 MG/ML
0.25 INJECTION, SOLUTION SUBCUTANEOUS WEEKLY
Qty: 3 ML | Refills: 0 | Status: SHIPPED | OUTPATIENT
Start: 2024-04-10

## 2024-04-12 ENCOUNTER — OUTSIDE FACILITY SERVICE (OUTPATIENT)
Dept: UROLOGY | Facility: CLINIC | Age: 69
End: 2024-04-12
Payer: COMMERCIAL

## 2024-04-12 ENCOUNTER — DOCUMENTATION (OUTPATIENT)
Dept: UROLOGY | Facility: CLINIC | Age: 69
End: 2024-04-12

## 2024-04-12 DIAGNOSIS — N32.81 OAB (OVERACTIVE BLADDER): Primary | ICD-10-CM

## 2024-04-12 RX ORDER — TRAMADOL HYDROCHLORIDE 50 MG/1
50 TABLET ORAL EVERY 8 HOURS PRN
Qty: 10 TABLET | Refills: 0 | Status: SHIPPED | OUTPATIENT
Start: 2024-04-12

## 2024-04-12 NOTE — PROGRESS NOTES
[facility] [address] [address]      OPERATIVE REPORT - AXONICS PNE BASIC TRIAL    Patient Name:  Izabel Abreu  YOB: 1955    Patient MRN  [id number]    Date of Surgery:       Indications:  Ms Abreu is a 67 yo female with OAB, urge incontinence, and fecal urgency.     Pre-op Diagnosis:   Urgency/frequency syndrome  Urgency Incontinence  Overactive bladder    Post-op Diagnosis:   Urgency/frequency syndrome  Urgency Incontinence  Overactive bladder    Procedure Performed:   BILATERAL AXONICS PNE BASIC TRIAL (MODIFIER 50)     Procedure/CPT® Codes:  44300 (moidifier 50)     Staff:  Christine Ryder MD    Anesthesia: MAC    Estimated Blood Loss: Minimal    Implants:  Axonics temporary percutaneous lead wires (bilateral)     Specimen:  None    Drains: None      Findings:  Uncomplicated S3 placement of temporary percutaneous lead wires (bilateral)     Complications: None    Description of Procedure:   The patient was identified in the preoperative holding area, informed consent was reviewed and signed.  The patient was transferred back to the OR where she was placed prone and monitored anesthesia care was administered.  A brief timeout was performed.  Preoperative antibiotics were administered.     The lower back and buttocks were prepped and draped in sterile fashion.  Pillows were placed into the lower abdomen and hips to level and flatten the sacrum and allow the toes to dangle freely a ground pad was placed in the bottom of the patient's foot and was connected to the clinician  along with the test stimulation cable (J-hook).       The patient was then prepped and draped in sterile fashion.  Bony landmarks were used to determine the level of the S3 and midline.  9 cm was measured from the tip of the coccyx to estimate the level of S3 and spinous process of the sacrum were palpated to determine midline.  The skin was marked in a crosshairs fashion  at these locations.  The skin was then marked 2 cm up and 2 cm lateral from the intersection of the crosshairs marked to estimate the location of the needle entry point.  This was performed bilaterally.     1% lidocaine was administered at the skin and deep to the skin and subcutaneous tissues.  The foramen needle was then placed and advanced to the bony plate of the sacrum at the previously marked location.  The needle was repositioned as necessary to gain access into the S3 foramen.  Once the foramen was accessed the needle was advanced slightly to approximate the tip of the foramen needle being at the anterior aspect of the sacrum.  The J-hook was then placed on the uninflated portion of the foramen needle and stimulation was applied to obtain the opening threshold amplitude.  Sensory response was then noted with good justin and puckering of the perineum and anus as well as good response of the great toe with great toe flexion noted, well below 2 mAmps.  The patient was then awoken from anesthesia and we asked her whether she was feeling any of these responses, she endorsed pressure and pulling sensation at the rectum, vagina and bicycle seat area.       The foramen needle was then advanced approximately 1 to 2 cm and retested at the depth to ensure continued motor or sensory response.  Once the ideal location was confirmed a percutaneous lead was placed through the foramen needle and then tested observing for motor and sensory response.  After satisfactory lead positioning was performed to confirm needle was retracted over the percutaneous lead.  Retesting of the lead confirmed appropriate response.     The procedure was then repeated on the contralateral side.  The percutaneous leads were then coiled and a 2 cm gauze placed over each followed by a small Tegaderm.  The PNE leads were connected to the ground pad a basic trial cable placing the ground pad on the skin close to the Tegaderm.  The basic trial cable  was connected to the external trial system and impedances were checked and were stable.  A large Tegaderm was placed over all devices.  The patient tolerated the procedure well with no complications and was awoken from anesthesia and sent to the postoperative area in stable condition.        Disposition/Follow Up:  Pt will be discharged home once PACU criteria is met.  I will see her back within 1 week.  The intraoperative findings and postoperative plans were discussed with patient and family.     Christine Ryder MD

## 2024-04-15 ENCOUNTER — TELEPHONE (OUTPATIENT)
Dept: UROLOGY | Facility: CLINIC | Age: 69
End: 2024-04-15
Payer: COMMERCIAL

## 2024-04-15 NOTE — TELEPHONE ENCOUNTER
Hub staff attempted to follow warm transfer process and was unsuccessful     Caller: Izabel Abreu    Relationship to patient: Self    Best call back number: [phone]    Patient is needing: PT HAS AXONOICS AND SHE HAS WIRES THAT COME OUT. PLEASE CALL HER BACK. THANK YOU.

## 2024-04-15 NOTE — TELEPHONE ENCOUNTER
Patient says that she has one long wire that is coming out, and 1 came all the way out and she has put band aids holding them down.  Will schedule with Dr. Ryder in the morning at 9:40 and I also contacted Mariano to let him know so that he could be here.

## 2024-04-16 ENCOUNTER — OFFICE VISIT (OUTPATIENT)
Dept: UROLOGY | Facility: CLINIC | Age: 69
End: 2024-04-16
Payer: COMMERCIAL

## 2024-04-16 VITALS
SYSTOLIC BLOOD PRESSURE: 112 MMHG | HEART RATE: 84 BPM | BODY MASS INDEX: 32.2 KG/M2 | WEIGHT: 175 LBS | HEIGHT: 62 IN | OXYGEN SATURATION: 98 % | DIASTOLIC BLOOD PRESSURE: 74 MMHG

## 2024-04-16 DIAGNOSIS — N32.81 OAB (OVERACTIVE BLADDER): Primary | ICD-10-CM

## 2024-04-16 DIAGNOSIS — R15.9 INCONTINENCE OF FECES, UNSPECIFIED FECAL INCONTINENCE TYPE: ICD-10-CM

## 2024-04-16 NOTE — H&P (VIEW-ONLY)
Follow Up Office Visit      Patient Name: Izabel Abreu  : 1955   MRN: [id number]     Chief Complaint:    Chief Complaint   Patient presents with    OAB (overactive bladder)       Referring Provider: No ref. provider found    History of Present Illness: Izabel Abreu is a 68 y.o. female who presents today for follow up of her urge and stool incontinence.  She underwent PNE and was very happy with her outcome.  She is eager to move forward.  She reported only 1 episode of bowel incontinence.  She reports her urgency improved significantly.    Subjective      Review of System:   Review of Systems   Constitutional:  Negative for chills, fatigue, fever and unexpected weight change.   HENT:  Negative for congestion, rhinorrhea and sore throat.    Eyes:  Negative for visual disturbance.   Respiratory:  Negative for apnea, cough, chest tightness and shortness of breath.    Cardiovascular:  Negative for chest pain.   Gastrointestinal:  Negative for abdominal pain, constipation, diarrhea, nausea and vomiting.   Endocrine: Negative for polydipsia and polyuria.   Genitourinary:  Negative for difficulty urinating, dysuria, enuresis, flank pain, frequency, genital sores, hematuria and urgency.   Musculoskeletal:  Negative for gait problem.   Skin:  Negative for rash and wound.   Allergic/Immunologic: Negative for immunocompromised state.   Neurological:  Negative for dizziness, tremors, syncope, weakness and light-headedness.   Hematological:  Does not bruise/bleed easily.      I have reviewed the ROS documented by my clinical staff, I have updated appropriately and I agree. Christine Ryder MD    I have reviewed and the following portions of the patient's history were updated as appropriate: past family history, past medical history, past social history, past surgical history and problem list.    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    Type 2 diabetes mellitus     Vitamin D deficiency        Past Surgical History:  Past Surgical History:   Procedure Laterality Date     SECTION      COLONOSCOPY      SCHEDULED FOR     D & C AND LAPAROSCOPY      OVARY SURGERY      and tubes    PARATHYROIDECTOMY N/A 2021    Procedure: PARATHYROIDECTOMY;  Surgeon: Apolinar Suh MD;  Location: [facility];  Service: General;  Laterality: N/A;    SHOULDER SURGERY      shoulder joint surgery-Abstracted from [place]     SINUS SURGERY      TOE SURGERY      TUBAL ABDOMINAL LIGATION Bilateral        Family History:   family history includes Cancer in her mother; Diabetes in her father and mother; Hypertension in her brother, father, mother, and sister; Thyroid disease in her sister.   Otherwise pertinent FHx was reviewed and not pertinent to current issue.    Social History:    reports that she has never smoked. She has been exposed to tobacco smoke. She has never used smokeless tobacco. She reports that she does not currently use alcohol. She reports that she does not currently use drugs.    Medications:     Current Outpatient Medications:     acetaminophen (TYLENOL) 650 MG 8 hr tablet, Take 2 tablets by mouth Every 8 (Eight) Hours As Needed for Mild Pain., Disp: , Rfl:     Alpha-D-Galactosidase (Beano) tablet, Take 2 doses by mouth 3 (Three) Times a Day As Needed (FLATULENCE)., Disp: , Rfl:     amLODIPine (NORVASC) 5 MG tablet, TAKE 1 TABLET BY MOUTH DAILY, Disp: 90 tablet, Rfl: 1     bisoprolol (ZEBeta) 10 MG tablet, Take 1 tablet by mouth Daily., Disp: 90 tablet, Rfl: 1    Cholecalciferol (Vitamin D3) 1.25 MG (66603 UT) capsule, Take 1 capsule by mouth Every 7 (Seven) Days., Disp: 12 capsule, Rfl: 1    clobetasol (TEMOVATE) 0.05 % ointment, Apply 1 Application topically to the appropriate area as directed 3 (Three) Times a Day As Needed. As needed, Disp: , Rfl:     dicyclomine (BENTYL) 10 MG capsule, Take 1 capsule by mouth 4 (Four) Times a Day Before Meals & at Bedtime., Disp: , Rfl:     estradiol (ESTRACE) 0.1 MG/GM vaginal cream, Insert 1 g into the vagina 3 (Three) Times a Week if Needed (VAGINAL ATROPHY)., Disp: , Rfl:     fenofibrate micronized (LOFIBRA) 134 MG capsule, TAKE 1 CAPSULE BY MOUTH DAILY, Disp: 90 capsule, Rfl: 1    fluticasone (FLONASE) 50 MCG/ACT nasal spray, 2 sprays into the nostril(s) as directed by provider Daily As Needed for Rhinitis or Allergies., Disp: , Rfl:     glucose blood (OneTouch Verio) test strip, USE TO TEST BLOOD SUGARS DAILY. DX CODE E11.65, Disp: 100 each, Rfl: 3    losartan (COZAAR) 100 MG tablet, Take 1 tablet by mouth Daily., Disp: 90 tablet, Rfl: 1    meloxicam (MOBIC) 15 MG tablet, TAKE 1 TABLET BY MOUTH DAILY, Disp: 30 tablet, Rfl: 0    metFORMIN (GLUCOPHAGE) 1000 MG tablet, TAKE 1/2 TABLET TWICE A DAY, Disp: 90 tablet, Rfl: 3    methocarbamol (ROBAXIN) 750 MG tablet, Take 1 tablet by mouth 3 (Three) Times a Day., Disp: 90 tablet, Rfl: 1    olopatadine (PATADAY) 0.2 % solution ophthalmic solution, Administer 1 drop to both eyes Daily., Disp: , Rfl:     oxybutynin XL (Ditropan XL) 10 MG 24 hr tablet, Take 1 tablet by mouth Daily for 90 days., Disp: 30 tablet, Rfl: 2    pancrelipase, Lip-Prot-Amyl, (CREON) 53346-69054 units capsule delayed-release particles capsule, Take 3 capsules by mouth 3 (Three) Times a Day With Meals., Disp: , Rfl:     promethazine (PHENERGAN) 25 MG tablet, Take 1 tablet by mouth Every 8 (Eight) Hours As Needed for Nausea or  "Vomiting., Disp: 30 tablet, Rfl: 0    Semaglutide,0.25 or 0.5MG/DOS, (Ozempic, 0.25 or 0.5 MG/DOSE,) 2 MG/3ML solution pen-injector, Inject 0.25 mg under the skin into the appropriate area as directed 1 (One) Time Per Week., Disp: 3 mL, Rfl: 0    sertraline (ZOLOFT) 100 MG tablet, Take 1 tablet by mouth Daily., Disp: 90 tablet, Rfl: 1    simvastatin (ZOCOR) 40 MG tablet, TAKE 1 TABLET BY MOUTH DAILY, Disp: 90 tablet, Rfl: 1    traMADol (Ultram) 50 MG tablet, Take 1 tablet by mouth Every 8 (Eight) Hours As Needed for Severe Pain., Disp: 10 tablet, Rfl: 0    vitamin B-12 (CYANOCOBALAMIN) 1000 MCG tablet, Take 1 tablet by mouth Daily., Disp: , Rfl:     Allergies:   Allergies   Allergen Reactions    Diflucan [Fluconazole] Headache    Azithromycin GI Intolerance    Codeine Itching    Hydrocodone-Acetaminophen Hives    Latex Itching    Penicillins Hives    Quinapril Other (See Comments)     cough         Objective     Physical Exam:   Vital Signs:   Vitals:    04/16/24 0947   BP: 112/74   Pulse: 84   SpO2: 98%   Weight: 79.4 kg (175 lb)   Height: 157.5 cm (62.01\")   PainSc: 0-No pain     Body mass index is 32 kg/m².     Physical Exam  Vitals and nursing note reviewed. Exam conducted with a chaperone present.   Constitutional:       General: She is awake. She is not in acute distress.     Appearance: Normal appearance.   HENT:      Head: Normocephalic and atraumatic.      Right Ear: External ear normal.      Left Ear: External ear normal.      Nose: Nose normal.   Eyes:      Conjunctiva/sclera: Conjunctivae normal.   Pulmonary:      Effort: Pulmonary effort is normal. No respiratory distress.   Abdominal:      General: Abdomen is flat. There is no distension.      Palpations: Abdomen is soft. There is no mass.      Tenderness: There is no abdominal tenderness. There is no right CVA tenderness, left CVA tenderness, guarding or rebound.      Hernia: No hernia is present.   Genitourinary:     Exam position: Lithotomy " "position.   Skin:     General: Skin is warm.   Neurological:      General: No focal deficit present.      Mental Status: She is alert and oriented to person, place, and time.      Gait: Gait normal.   Psychiatric:         Behavior: Behavior normal. Behavior is cooperative.         Thought Content: Thought content normal.         Judgment: Judgment normal.         Labs:   Brief Urine Lab Results  (Last result in the past 365 days)        Color   Clarity   Blood   Leuk Est   Nitrite   Protein   CREAT   Urine HCG        03/22/24 1502 Yellow   Clear   Negative   Negative   Negative   Negative                        Lab Results   Component Value Date    GLUCOSE 145 (H) 11/29/2023    CALCIUM 10.2 11/29/2023     11/29/2023    K 4.8 11/29/2023    CO2 22 11/29/2023     11/29/2023    BUN 18 11/29/2023    CREATININE 0.74 11/29/2023    EGFRIFNONA 95 02/25/2022    BCR 24 11/29/2023    ANIONGAP 9.5 02/10/2023       Lab Results   Component Value Date    WBC 8.2 11/29/2023    HGB 13.0 11/29/2023    HCT 40.6 11/29/2023    MCV 82 11/29/2023     11/29/2023       No results found for: \"PSA\"    Images:   No Images in the past 120 days found..    Measures:   Tobacco:   Izabel Abreu  reports that she has never smoked. She has been exposed to tobacco smoke. She has never used smokeless tobacco.      Urine Incontinence: Patient reports that she is not currently experiencing any symptoms of urinary incontinence.         Assessment / Plan      Assessment/Plan:   68 y.o. female who presented today for follow up after PNE.  She did really well and is excited for a full implant.  I discussed specific risks and benefits and she is eager to move forward.  She would like a rechargeable device on the right side.     Diagnoses and all orders for this visit:    1. OAB (overactive bladder) (Primary)  -     Case Request; Standing  -     CBC (No Diff); Future  -     Basic Metabolic Panel; Future  -     Protime-INR; Future  -     " ECG 12 Lead; Future  -     Case Request    2. Incontinence of feces, unspecified fecal incontinence type  -     Case Request; Standing  -     CBC (No Diff); Future  -     Basic Metabolic Panel; Future  -     Protime-INR; Future  -     ECG 12 Lead; Future  -     Case Request    Other orders  -     Follow Anesthesia Guidelines / Protocol; Future  -     Provide NPO Instructions to Patient; Future  -     Provide Hydration Instructions to Patient; Future  -     Provide Patient With Enhanced Recovery Booklet(s) or Handout; Future  -     Provide Chlorhexidine Skin Prep Wipes and Instructions; Future  -     Nursing to Order Blood Glucose on all Inpatients >18 years Old with not BMP Ordered; Future  -     Nursing to Place Order for HgbA1c on Adult Patients >18 Years Old (HgbA1c Within One Month of Admission Acceptable); Future  -     Follow Anesthesia Guidelines / Protocol; Standing  -     Provide Patient With Enhanced Recovery Booklet(s) OR Handout; Standing  -     Obtain Informed Consent; Standing  -     Verify NPO Status; Standing  -     Verify the Time Patient Completed Gatorade / G2; Standing  -     Place Sequential Compression Device; Standing  -     Maintain Sequential Compression Device; Standing  -     Nurse to Contact Surgeon for Cardiology Consult if Indicated; Future  -     Nurse to Consult  Anesthesia if Indicated; Future         Follow Up:   Return for Follow up after surgery.    I spent approximately 30 minutes providing clinical care for this patient; including review of patient's chart and provider documentation, face to face time spent with patient in examination room (obtaining history, performing physical exam, discussing diagnosis and management options), placing orders, and completing patient documentation.     Christine Ryder MD  [facility] [place]

## 2024-04-16 NOTE — PROGRESS NOTES
Follow Up Office Visit      Patient Name: Izabel Abreu  : 1955   MRN: [id number]     Chief Complaint:    Chief Complaint   Patient presents with    OAB (overactive bladder)       Referring Provider: No ref. provider found    History of Present Illness: Izabel Abreu is a 68 y.o. female who presents today for follow up of her urge and stool incontinence.  She underwent PNE and was very happy with her outcome.  She is eager to move forward.  She reported only 1 episode of bowel incontinence.  She reports her urgency improved significantly.    Subjective      Review of System:   Review of Systems   Constitutional:  Negative for chills, fatigue, fever and unexpected weight change.   HENT:  Negative for congestion, rhinorrhea and sore throat.    Eyes:  Negative for visual disturbance.   Respiratory:  Negative for apnea, cough, chest tightness and shortness of breath.    Cardiovascular:  Negative for chest pain.   Gastrointestinal:  Negative for abdominal pain, constipation, diarrhea, nausea and vomiting.   Endocrine: Negative for polydipsia and polyuria.   Genitourinary:  Negative for difficulty urinating, dysuria, enuresis, flank pain, frequency, genital sores, hematuria and urgency.   Musculoskeletal:  Negative for gait problem.   Skin:  Negative for rash and wound.   Allergic/Immunologic: Negative for immunocompromised state.   Neurological:  Negative for dizziness, tremors, syncope, weakness and light-headedness.   Hematological:  Does not bruise/bleed easily.      I have reviewed the ROS documented by my clinical staff, I have updated appropriately and I agree. Christine Ryder MD    I have reviewed and the following portions of the patient's history were updated as appropriate: past family history, past medical history, past social history, past surgical history and problem list.    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    Type 2 diabetes mellitus     Vitamin D deficiency        Past Surgical History:  Past Surgical History:   Procedure Laterality Date     SECTION      COLONOSCOPY      SCHEDULED FOR     D & C AND LAPAROSCOPY      OVARY SURGERY      and tubes    PARATHYROIDECTOMY N/A 2021    Procedure: PARATHYROIDECTOMY;  Surgeon: Apolinar Suh MD;  [facility]: [facility];  Service: General;  Laterality: N/A;    SHOULDER SURGERY      shoulder joint surgery-Abstracted from [place]     SINUS SURGERY      TOE SURGERY      TUBAL ABDOMINAL LIGATION Bilateral        Family History:   family history includes Cancer in her mother; Diabetes in her father and mother; Hypertension in her brother, father, mother, and sister; Thyroid disease in her sister.   Otherwise pertinent FHx was reviewed and not pertinent to current issue.    Social History:    reports that she has never smoked. She has been exposed to tobacco smoke. She has never used smokeless tobacco. She reports that she does not currently use alcohol. She reports that she does not currently use drugs.    Medications:     Current Outpatient Medications:     acetaminophen (TYLENOL) 650 MG 8 hr tablet, Take 2 tablets by mouth Every 8 (Eight) Hours As Needed for Mild Pain., Disp: , Rfl:     Alpha-D-Galactosidase (Beano) tablet, Take 2 doses by mouth 3 (Three) Times a Day As Needed (FLATULENCE)., Disp: , Rfl:     amLODIPine (NORVASC) 5 MG tablet, TAKE 1 TABLET BY MOUTH DAILY, Disp: 90 tablet, Rfl: 1     bisoprolol (ZEBeta) 10 MG tablet, Take 1 tablet by mouth Daily., Disp: 90 tablet, Rfl: 1    Cholecalciferol (Vitamin D3) 1.25 MG (08030 UT) capsule, Take 1 capsule by mouth Every 7 (Seven) Days., Disp: 12 capsule, Rfl: 1    clobetasol (TEMOVATE) 0.05 % ointment, Apply 1 Application topically to the appropriate area as directed 3 (Three) Times a Day As Needed. As needed, Disp: , Rfl:     dicyclomine (BENTYL) 10 MG capsule, Take 1 capsule by mouth 4 (Four) Times a Day Before Meals & at Bedtime., Disp: , Rfl:     estradiol (ESTRACE) 0.1 MG/GM vaginal cream, Insert 1 g into the vagina 3 (Three) Times a Week if Needed (VAGINAL ATROPHY)., Disp: , Rfl:     fenofibrate micronized (LOFIBRA) 134 MG capsule, TAKE 1 CAPSULE BY MOUTH DAILY, Disp: 90 capsule, Rfl: 1    fluticasone (FLONASE) 50 MCG/ACT nasal spray, 2 sprays into the nostril(s) as directed by provider Daily As Needed for Rhinitis or Allergies., Disp: , Rfl:     glucose blood (OneTouch Verio) test strip, USE TO TEST BLOOD SUGARS DAILY. DX CODE E11.65, Disp: 100 each, Rfl: 3    losartan (COZAAR) 100 MG tablet, Take 1 tablet by mouth Daily., Disp: 90 tablet, Rfl: 1    meloxicam (MOBIC) 15 MG tablet, TAKE 1 TABLET BY MOUTH DAILY, Disp: 30 tablet, Rfl: 0    metFORMIN (GLUCOPHAGE) 1000 MG tablet, TAKE 1/2 TABLET TWICE A DAY, Disp: 90 tablet, Rfl: 3    methocarbamol (ROBAXIN) 750 MG tablet, Take 1 tablet by mouth 3 (Three) Times a Day., Disp: 90 tablet, Rfl: 1    olopatadine (PATADAY) 0.2 % solution ophthalmic solution, Administer 1 drop to both eyes Daily., Disp: , Rfl:     oxybutynin XL (Ditropan XL) 10 MG 24 hr tablet, Take 1 tablet by mouth Daily for 90 days., Disp: 30 tablet, Rfl: 2    pancrelipase, Lip-Prot-Amyl, (CREON) 01297-88309 units capsule delayed-release particles capsule, Take 3 capsules by mouth 3 (Three) Times a Day With Meals., Disp: , Rfl:     promethazine (PHENERGAN) 25 MG tablet, Take 1 tablet by mouth Every 8 (Eight) Hours As Needed for Nausea or  "Vomiting., Disp: 30 tablet, Rfl: 0    Semaglutide,0.25 or 0.5MG/DOS, (Ozempic, 0.25 or 0.5 MG/DOSE,) 2 MG/3ML solution pen-injector, Inject 0.25 mg under the skin into the appropriate area as directed 1 (One) Time Per Week., Disp: 3 mL, Rfl: 0    sertraline (ZOLOFT) 100 MG tablet, Take 1 tablet by mouth Daily., Disp: 90 tablet, Rfl: 1    simvastatin (ZOCOR) 40 MG tablet, TAKE 1 TABLET BY MOUTH DAILY, Disp: 90 tablet, Rfl: 1    traMADol (Ultram) 50 MG tablet, Take 1 tablet by mouth Every 8 (Eight) Hours As Needed for Severe Pain., Disp: 10 tablet, Rfl: 0    vitamin B-12 (CYANOCOBALAMIN) 1000 MCG tablet, Take 1 tablet by mouth Daily., Disp: , Rfl:     Allergies:   Allergies   Allergen Reactions    Diflucan [Fluconazole] Headache    Azithromycin GI Intolerance    Codeine Itching    Hydrocodone-Acetaminophen Hives    Latex Itching    Penicillins Hives    Quinapril Other (See Comments)     cough         Objective     Physical Exam:   Vital Signs:   Vitals:    04/16/24 0947   BP: 112/74   Pulse: 84   SpO2: 98%   Weight: 79.4 kg (175 lb)   Height: 157.5 cm (62.01\")   PainSc: 0-No pain     Body mass index is 32 kg/m².     Physical Exam  Vitals and nursing note reviewed. Exam conducted with a chaperone present.   Constitutional:       General: She is awake. She is not in acute distress.     Appearance: Normal appearance.   HENT:      Head: Normocephalic and atraumatic.      Right Ear: External ear normal.      Left Ear: External ear normal.      Nose: Nose normal.   Eyes:      Conjunctiva/sclera: Conjunctivae normal.   Pulmonary:      Effort: Pulmonary effort is normal. No respiratory distress.   Abdominal:      General: Abdomen is flat. There is no distension.      Palpations: Abdomen is soft. There is no mass.      Tenderness: There is no abdominal tenderness. There is no right CVA tenderness, left CVA tenderness, guarding or rebound.      Hernia: No hernia is present.   Genitourinary:     Exam position: Lithotomy " "position.   Skin:     General: Skin is warm.   Neurological:      General: No focal deficit present.      Mental Status: She is alert and oriented to person, place, and time.      Gait: Gait normal.   Psychiatric:         Behavior: Behavior normal. Behavior is cooperative.         Thought Content: Thought content normal.         Judgment: Judgment normal.         Labs:   Brief Urine Lab Results  (Last result in the past 365 days)        Color   Clarity   Blood   Leuk Est   Nitrite   Protein   CREAT   Urine HCG        03/22/24 1502 Yellow   Clear   Negative   Negative   Negative   Negative                        Lab Results   Component Value Date    GLUCOSE 145 (H) 11/29/2023    CALCIUM 10.2 11/29/2023     11/29/2023    K 4.8 11/29/2023    CO2 22 11/29/2023     11/29/2023    BUN 18 11/29/2023    CREATININE 0.74 11/29/2023    EGFRIFNONA 95 02/25/2022    BCR 24 11/29/2023    ANIONGAP 9.5 02/10/2023       Lab Results   Component Value Date    WBC 8.2 11/29/2023    HGB 13.0 11/29/2023    HCT 40.6 11/29/2023    MCV 82 11/29/2023     11/29/2023       No results found for: \"PSA\"    Images:   No Images in the past 120 days found..    Measures:   Tobacco:   Izabel Abreu  reports that she has never smoked. She has been exposed to tobacco smoke. She has never used smokeless tobacco.      Urine Incontinence: Patient reports that she is not currently experiencing any symptoms of urinary incontinence.         Assessment / Plan      Assessment/Plan:   68 y.o. female who presented today for follow up after PNE.  She did really well and is excited for a full implant.  I discussed specific risks and benefits and she is eager to move forward.  She would like a rechargeable device on the right side.     Diagnoses and all orders for this visit:    1. OAB (overactive bladder) (Primary)  -     Case Request; Standing  -     CBC (No Diff); Future  -     Basic Metabolic Panel; Future  -     Protime-INR; Future  -     " ECG 12 Lead; Future  -     Case Request    2. Incontinence of feces, unspecified fecal incontinence type  -     Case Request; Standing  -     CBC (No Diff); Future  -     Basic Metabolic Panel; Future  -     Protime-INR; Future  -     ECG 12 Lead; Future  -     Case Request    Other orders  -     Follow Anesthesia Guidelines / Protocol; Future  -     Provide NPO Instructions to Patient; Future  -     Provide Hydration Instructions to Patient; Future  -     Provide Patient With Enhanced Recovery Booklet(s) or Handout; Future  -     Provide Chlorhexidine Skin Prep Wipes and Instructions; Future  -     Nursing to Order Blood Glucose on all Inpatients >18 years Old with not BMP Ordered; Future  -     Nursing to Place Order for HgbA1c on Adult Patients >18 Years Old (HgbA1c Within One Month of Admission Acceptable); Future  -     Follow Anesthesia Guidelines / Protocol; Standing  -     Provide Patient With Enhanced Recovery Booklet(s) OR Handout; Standing  -     Obtain Informed Consent; Standing  -     Verify NPO Status; Standing  -     Verify the Time Patient Completed Gatorade / G2; Standing  -     Place Sequential Compression Device; Standing  -     Maintain Sequential Compression Device; Standing  -     Nurse to Contact Surgeon for Cardiology Consult if Indicated; Future  -     Nurse to Consult  Anesthesia if Indicated; Future         Follow Up:   Return for Follow up after surgery.    I spent approximately 30 minutes providing clinical care for this patient; including review of patient's chart and provider documentation, face to face time spent with patient in examination room (obtaining history, performing physical exam, discussing diagnosis and management options), placing orders, and completing patient documentation.     Christine Ryder MD  [facility] [place]

## 2024-04-25 ENCOUNTER — PRE-ADMISSION TESTING (OUTPATIENT)
Dept: PREADMISSION TESTING | Facility: HOSPITAL | Age: 69
End: 2024-04-25
Payer: COMMERCIAL

## 2024-04-25 VITALS — BODY MASS INDEX: 32.44 KG/M2 | HEIGHT: 62 IN | WEIGHT: 176.26 LBS

## 2024-04-25 DIAGNOSIS — R15.9 INCONTINENCE OF FECES, UNSPECIFIED FECAL INCONTINENCE TYPE: ICD-10-CM

## 2024-04-25 DIAGNOSIS — N32.81 OAB (OVERACTIVE BLADDER): ICD-10-CM

## 2024-04-25 LAB
ANION GAP SERPL CALCULATED.3IONS-SCNC: 14 MMOL/L (ref 5–15)
BUN SERPL-MCNC: 21 MG/DL (ref 8–23)
BUN/CREAT SERPL: 25 (ref 7–25)
CALCIUM SPEC-SCNC: 9.7 MG/DL (ref 8.6–10.5)
CHLORIDE SERPL-SCNC: 99 MMOL/L (ref 98–107)
CO2 SERPL-SCNC: 24 MMOL/L (ref 22–29)
CREAT SERPL-MCNC: 0.84 MG/DL (ref 0.57–1)
DEPRECATED RDW RBC AUTO: 47.5 FL (ref 37–54)
EGFRCR SERPLBLD CKD-EPI 2021: 76.3 ML/MIN/1.73
ERYTHROCYTE [DISTWIDTH] IN BLOOD BY AUTOMATED COUNT: 15.3 % (ref 12.3–15.4)
GLUCOSE SERPL-MCNC: 270 MG/DL (ref 65–99)
HCT VFR BLD AUTO: 40.2 % (ref 34–46.6)
HGB BLD-MCNC: 12.5 G/DL (ref 12–15.9)
INR PPP: 1.03 (ref 0.89–1.12)
MCH RBC QN AUTO: 26.7 PG (ref 26.6–33)
MCHC RBC AUTO-ENTMCNC: 31.1 G/DL (ref 31.5–35.7)
MCV RBC AUTO: 85.9 FL (ref 79–97)
PLATELET # BLD AUTO: 421 10*3/MM3 (ref 140–450)
PMV BLD AUTO: 10.8 FL (ref 6–12)
POTASSIUM SERPL-SCNC: 4.3 MMOL/L (ref 3.5–5.2)
PROTHROMBIN TIME: 13.6 SECONDS (ref 12.2–14.5)
RBC # BLD AUTO: 4.68 10*6/MM3 (ref 3.77–5.28)
SODIUM SERPL-SCNC: 137 MMOL/L (ref 136–145)
WBC NRBC COR # BLD AUTO: 8.97 10*3/MM3 (ref 3.4–10.8)

## 2024-04-25 PROCEDURE — 85027 COMPLETE CBC AUTOMATED: CPT

## 2024-04-25 PROCEDURE — 93005 ELECTROCARDIOGRAM TRACING: CPT

## 2024-04-25 PROCEDURE — 85610 PROTHROMBIN TIME: CPT

## 2024-04-25 PROCEDURE — 93010 ELECTROCARDIOGRAM REPORT: CPT | Performed by: INTERNAL MEDICINE

## 2024-04-25 PROCEDURE — 36415 COLL VENOUS BLD VENIPUNCTURE: CPT

## 2024-04-25 PROCEDURE — 80048 BASIC METABOLIC PNL TOTAL CA: CPT

## 2024-04-25 NOTE — PAT
Patient did not review general PAT education video as instructed in their preoperative information received from their surgeon.  One-on-one Pre Admission Testing general education provided during PAT visit.  Copies of PAT general education handouts (Incentive Spirometry, Meds to Beds Program, Patient Belongings, Pre-op skin preparation instructions, Blood Glucose testing, Visitor policy, Surgery FAQ, Code H) distributed to patient. Encouraged patient/family to read PAT general education handouts thoroughly and notify PAT staff with any questions or concerns. Patient instructed to bring PAT pass and completed skin prep sheet (if applicable) on the day of procedure. Patient verbalized understanding of all information and priority content.     Patient to apply Chlorhexadine wipes  to surgical area (as instructed) the night before procedure and the AM of procedure. Wipes provided.    Patient instructed to drink 20 ounces of Gatorade or Gatorlyte (if diabetic) and it needs to be completed 1 hour (for Main OR patients) or 2 hours (scheduled  section & [facility] patients) before given arrival time for procedure (NO RED Gatorade and NO Gatorade Zero).    Patient verbalized understanding.    Per Anesthesia Request, patient instructed not to take their ACE/ARB medications on the AM of surgery.

## 2024-04-29 LAB
QT INTERVAL: 404 MS
QTC INTERVAL: 429 MS

## 2024-04-30 ENCOUNTER — TELEPHONE (OUTPATIENT)
Dept: UROLOGY | Facility: CLINIC | Age: 69
End: 2024-04-30

## 2024-04-30 NOTE — TELEPHONE ENCOUNTER
Hub staff attempted to follow warm transfer process and was unsuccessful     Caller: Izabel Abreu     Relationship to patient: SELF    Best call back number: [phone]     Patient is needing: PT IS SCHEDULED FOR AXONICS PLACEMENT ON 5-2-24. SHE STATES SHE HAD BOWEL MOVEMENTS ON SATURDAY AND SUNDAY BUT HASN'T HAD ONCE SINCE.     SHE HAS TAKEN MIRALAX, IS THERE ANYTHING ELSE SHE NEEDS TO DO OR CAN TAKE?    PLEASE GIVE PT A CALL BACK.

## 2024-05-01 ENCOUNTER — ANESTHESIA EVENT (OUTPATIENT)
Dept: PERIOP | Facility: HOSPITAL | Age: 69
End: 2024-05-01
Payer: COMMERCIAL

## 2024-05-01 RX ORDER — SODIUM CHLORIDE 0.9 % (FLUSH) 0.9 %
10 SYRINGE (ML) INJECTION AS NEEDED
Status: CANCELLED | OUTPATIENT
Start: 2024-05-01

## 2024-05-01 RX ORDER — FAMOTIDINE 10 MG/ML
20 INJECTION, SOLUTION INTRAVENOUS ONCE
Status: CANCELLED | OUTPATIENT
Start: 2024-05-01 | End: 2024-05-01

## 2024-05-01 RX ORDER — SODIUM CHLORIDE 9 MG/ML
40 INJECTION, SOLUTION INTRAVENOUS AS NEEDED
Status: CANCELLED | OUTPATIENT
Start: 2024-05-01

## 2024-05-01 RX ORDER — SODIUM CHLORIDE 0.9 % (FLUSH) 0.9 %
10 SYRINGE (ML) INJECTION EVERY 12 HOURS SCHEDULED
Status: CANCELLED | OUTPATIENT
Start: 2024-05-01

## 2024-05-02 ENCOUNTER — APPOINTMENT (OUTPATIENT)
Dept: GENERAL RADIOLOGY | Facility: HOSPITAL | Age: 69
End: 2024-05-02
Payer: COMMERCIAL

## 2024-05-02 ENCOUNTER — TELEPHONE (OUTPATIENT)
Dept: UROLOGY | Facility: CLINIC | Age: 69
End: 2024-05-02

## 2024-05-02 ENCOUNTER — HOSPITAL ENCOUNTER (OUTPATIENT)
Facility: HOSPITAL | Age: 69
Setting detail: HOSPITAL OUTPATIENT SURGERY
Discharge: HOME | End: 2024-05-02
Attending: UROLOGY | Admitting: UROLOGY
Payer: COMMERCIAL

## 2024-05-02 ENCOUNTER — ANESTHESIA (OUTPATIENT)
Dept: PERIOP | Facility: HOSPITAL | Age: 69
End: 2024-05-02
Payer: COMMERCIAL

## 2024-05-02 VITALS
TEMPERATURE: 97.4 F | SYSTOLIC BLOOD PRESSURE: 120 MMHG | RESPIRATION RATE: 16 BRPM | OXYGEN SATURATION: 90 % | HEIGHT: 62 IN | DIASTOLIC BLOOD PRESSURE: 61 MMHG | BODY MASS INDEX: 32.44 KG/M2 | HEART RATE: 68 BPM | WEIGHT: 176.26 LBS

## 2024-05-02 DIAGNOSIS — N32.81 OAB (OVERACTIVE BLADDER): ICD-10-CM

## 2024-05-02 DIAGNOSIS — R15.9 INCONTINENCE OF FECES, UNSPECIFIED FECAL INCONTINENCE TYPE: Primary | ICD-10-CM

## 2024-05-02 LAB
GLUCOSE BLDC GLUCOMTR-MCNC: 232 MG/DL (ref 70–130)
GLUCOSE BLDC GLUCOMTR-MCNC: 244 MG/DL (ref 70–130)

## 2024-05-02 PROCEDURE — 64590 INS/RPL PRPH SAC/GSTR NPG/R: CPT | Performed by: UROLOGY

## 2024-05-02 PROCEDURE — L8689 EXTERNAL RECHARG SYS INTERN: HCPCS | Performed by: UROLOGY

## 2024-05-02 PROCEDURE — 76000 FLUOROSCOPY <1 HR PHYS/QHP: CPT

## 2024-05-02 PROCEDURE — 82948 REAGENT STRIP/BLOOD GLUCOSE: CPT

## 2024-05-02 PROCEDURE — 25010000002 VANCOMYCIN HCL 1.25 G RECONSTITUTED SOLUTION 1 EACH VIAL: Performed by: UROLOGY

## 2024-05-02 PROCEDURE — 25010000002 FENTANYL CITRATE (PF) 100 MCG/2ML SOLUTION: Performed by: NURSE ANESTHETIST, CERTIFIED REGISTERED

## 2024-05-02 PROCEDURE — C1778 LEAD, NEUROSTIMULATOR: HCPCS | Performed by: UROLOGY

## 2024-05-02 PROCEDURE — C1787 PATIENT PROGR, NEUROSTIM: HCPCS | Performed by: UROLOGY

## 2024-05-02 PROCEDURE — 25010000002 ONDANSETRON PER 1 MG: Performed by: NURSE ANESTHETIST, CERTIFIED REGISTERED

## 2024-05-02 PROCEDURE — 64561 IMPLANT NEUROELECTRODES: CPT | Performed by: UROLOGY

## 2024-05-02 PROCEDURE — 25810000003 SODIUM CHLORIDE 0.9 % SOLUTION 250 ML FLEX CONT: Performed by: UROLOGY

## 2024-05-02 PROCEDURE — 63710000001 INSULIN LISPRO (HUMAN) PER 5 UNITS

## 2024-05-02 PROCEDURE — 99024 POSTOP FOLLOW-UP VISIT: CPT | Performed by: UROLOGY

## 2024-05-02 PROCEDURE — 25010000002 PROPOFOL 10 MG/ML EMULSION: Performed by: NURSE ANESTHETIST, CERTIFIED REGISTERED

## 2024-05-02 PROCEDURE — C1767 GENERATOR, NEURO NON-RECHARG: HCPCS | Performed by: UROLOGY

## 2024-05-02 PROCEDURE — 25810000003 LACTATED RINGERS PER 1000 ML: Performed by: ANESTHESIOLOGY

## 2024-05-02 PROCEDURE — C1894 INTRO/SHEATH, NON-LASER: HCPCS | Performed by: UROLOGY

## 2024-05-02 DEVICE — TINED LEAD KIT
Type: IMPLANTABLE DEVICE | Site: BACK | Status: FUNCTIONAL
Brand: AXONICS

## 2024-05-02 DEVICE — NEUROSTIMULATOR
Type: IMPLANTABLE DEVICE | Site: BACK | Status: FUNCTIONAL
Brand: AXONICS

## 2024-05-02 RX ORDER — MIDAZOLAM HYDROCHLORIDE 1 MG/ML
0.5 INJECTION INTRAMUSCULAR; INTRAVENOUS
Status: DISCONTINUED | OUTPATIENT
Start: 2024-05-02 | End: 2024-05-02 | Stop reason: HOSPADM

## 2024-05-02 RX ORDER — SODIUM CHLORIDE, SODIUM LACTATE, POTASSIUM CHLORIDE, CALCIUM CHLORIDE 600; 310; 30; 20 MG/100ML; MG/100ML; MG/100ML; MG/100ML
9 INJECTION, SOLUTION INTRAVENOUS CONTINUOUS
Status: DISCONTINUED | OUTPATIENT
Start: 2024-05-02 | End: 2024-05-02 | Stop reason: HOSPADM

## 2024-05-02 RX ORDER — TRAMADOL HYDROCHLORIDE 50 MG/1
50 TABLET ORAL EVERY 8 HOURS PRN
Qty: 12 TABLET | Refills: 0 | Status: SHIPPED | OUTPATIENT
Start: 2024-05-02

## 2024-05-02 RX ORDER — TIRZEPATIDE 7.5 MG/.5ML
7.5 INJECTION, SOLUTION SUBCUTANEOUS WEEKLY
COMMUNITY
Start: 2024-04-24 | End: 2024-05-10

## 2024-05-02 RX ORDER — PROMETHAZINE HYDROCHLORIDE 25 MG/1
25 SUPPOSITORY RECTAL ONCE AS NEEDED
Status: DISCONTINUED | OUTPATIENT
Start: 2024-05-02 | End: 2024-05-02 | Stop reason: HOSPADM

## 2024-05-02 RX ORDER — ONDANSETRON 2 MG/ML
4 INJECTION INTRAMUSCULAR; INTRAVENOUS ONCE AS NEEDED
Status: DISCONTINUED | OUTPATIENT
Start: 2024-05-02 | End: 2024-05-02 | Stop reason: HOSPADM

## 2024-05-02 RX ORDER — SUCCINYLCHOLINE/SOD CL,ISO/PF 200MG/10ML
SYRINGE (ML) INTRAVENOUS AS NEEDED
Status: DISCONTINUED | OUTPATIENT
Start: 2024-05-02 | End: 2024-05-02 | Stop reason: SURG

## 2024-05-02 RX ORDER — FENTANYL CITRATE 50 UG/ML
50 INJECTION, SOLUTION INTRAMUSCULAR; INTRAVENOUS
Status: DISCONTINUED | OUTPATIENT
Start: 2024-05-02 | End: 2024-05-02 | Stop reason: HOSPADM

## 2024-05-02 RX ORDER — DROPERIDOL 2.5 MG/ML
0.62 INJECTION, SOLUTION INTRAMUSCULAR; INTRAVENOUS
Status: DISCONTINUED | OUTPATIENT
Start: 2024-05-02 | End: 2024-05-02 | Stop reason: HOSPADM

## 2024-05-02 RX ORDER — SULFAMETHOXAZOLE AND TRIMETHOPRIM 800; 160 MG/1; MG/1
1 TABLET ORAL 2 TIMES DAILY
Qty: 14 TABLET | Refills: 0 | Status: SHIPPED | OUTPATIENT
Start: 2024-05-02 | End: 2024-05-09

## 2024-05-02 RX ORDER — SODIUM CHLORIDE 9 MG/ML
40 INJECTION, SOLUTION INTRAVENOUS AS NEEDED
Status: DISCONTINUED | OUTPATIENT
Start: 2024-05-02 | End: 2024-05-02 | Stop reason: HOSPADM

## 2024-05-02 RX ORDER — INSULIN LISPRO 100 [IU]/ML
2 INJECTION, SOLUTION INTRAVENOUS; SUBCUTANEOUS ONCE
Status: COMPLETED | OUTPATIENT
Start: 2024-05-02 | End: 2024-05-02

## 2024-05-02 RX ORDER — NALOXONE HCL 0.4 MG/ML
0.4 VIAL (ML) INJECTION AS NEEDED
Status: DISCONTINUED | OUTPATIENT
Start: 2024-05-02 | End: 2024-05-02 | Stop reason: HOSPADM

## 2024-05-02 RX ORDER — LIDOCAINE HYDROCHLORIDE 10 MG/ML
INJECTION, SOLUTION EPIDURAL; INFILTRATION; INTRACAUDAL; PERINEURAL AS NEEDED
Status: DISCONTINUED | OUTPATIENT
Start: 2024-05-02 | End: 2024-05-02 | Stop reason: HOSPADM

## 2024-05-02 RX ORDER — LIDOCAINE HYDROCHLORIDE 10 MG/ML
INJECTION, SOLUTION EPIDURAL; INFILTRATION; INTRACAUDAL; PERINEURAL AS NEEDED
Status: DISCONTINUED | OUTPATIENT
Start: 2024-05-02 | End: 2024-05-02 | Stop reason: SURG

## 2024-05-02 RX ORDER — DROPERIDOL 2.5 MG/ML
0.62 INJECTION, SOLUTION INTRAMUSCULAR; INTRAVENOUS ONCE AS NEEDED
Status: DISCONTINUED | OUTPATIENT
Start: 2024-05-02 | End: 2024-05-02 | Stop reason: HOSPADM

## 2024-05-02 RX ORDER — FAMOTIDINE 20 MG/1
20 TABLET, FILM COATED ORAL ONCE
Status: COMPLETED | OUTPATIENT
Start: 2024-05-02 | End: 2024-05-02

## 2024-05-02 RX ORDER — PROMETHAZINE HYDROCHLORIDE 25 MG/1
25 TABLET ORAL ONCE AS NEEDED
Status: DISCONTINUED | OUTPATIENT
Start: 2024-05-02 | End: 2024-05-02 | Stop reason: HOSPADM

## 2024-05-02 RX ORDER — SODIUM CHLORIDE 0.9 % (FLUSH) 0.9 %
3 SYRINGE (ML) INJECTION EVERY 12 HOURS SCHEDULED
Status: DISCONTINUED | OUTPATIENT
Start: 2024-05-02 | End: 2024-05-02 | Stop reason: HOSPADM

## 2024-05-02 RX ORDER — MEPERIDINE HYDROCHLORIDE 25 MG/ML
12.5 INJECTION INTRAMUSCULAR; INTRAVENOUS; SUBCUTANEOUS
Status: DISCONTINUED | OUTPATIENT
Start: 2024-05-02 | End: 2024-05-02 | Stop reason: HOSPADM

## 2024-05-02 RX ORDER — MAGNESIUM HYDROXIDE 1200 MG/15ML
LIQUID ORAL AS NEEDED
Status: DISCONTINUED | OUTPATIENT
Start: 2024-05-02 | End: 2024-05-02 | Stop reason: HOSPADM

## 2024-05-02 RX ORDER — ROCURONIUM BROMIDE 10 MG/ML
INJECTION, SOLUTION INTRAVENOUS AS NEEDED
Status: DISCONTINUED | OUTPATIENT
Start: 2024-05-02 | End: 2024-05-02 | Stop reason: SURG

## 2024-05-02 RX ORDER — INSULIN LISPRO 100 [IU]/ML
INJECTION, SOLUTION INTRAVENOUS; SUBCUTANEOUS
Status: COMPLETED
Start: 2024-05-02 | End: 2024-05-02

## 2024-05-02 RX ORDER — HYDRALAZINE HYDROCHLORIDE 20 MG/ML
5 INJECTION INTRAMUSCULAR; INTRAVENOUS
Status: DISCONTINUED | OUTPATIENT
Start: 2024-05-02 | End: 2024-05-02 | Stop reason: HOSPADM

## 2024-05-02 RX ORDER — PROPOFOL 10 MG/ML
VIAL (ML) INTRAVENOUS AS NEEDED
Status: DISCONTINUED | OUTPATIENT
Start: 2024-05-02 | End: 2024-05-02 | Stop reason: SURG

## 2024-05-02 RX ORDER — ONDANSETRON 2 MG/ML
INJECTION INTRAMUSCULAR; INTRAVENOUS AS NEEDED
Status: DISCONTINUED | OUTPATIENT
Start: 2024-05-02 | End: 2024-05-02 | Stop reason: SURG

## 2024-05-02 RX ORDER — LIDOCAINE HYDROCHLORIDE 10 MG/ML
0.5 INJECTION, SOLUTION EPIDURAL; INFILTRATION; INTRACAUDAL; PERINEURAL ONCE AS NEEDED
Status: COMPLETED | OUTPATIENT
Start: 2024-05-02 | End: 2024-05-02

## 2024-05-02 RX ORDER — IPRATROPIUM BROMIDE AND ALBUTEROL SULFATE 2.5; .5 MG/3ML; MG/3ML
3 SOLUTION RESPIRATORY (INHALATION) ONCE AS NEEDED
Status: DISCONTINUED | OUTPATIENT
Start: 2024-05-02 | End: 2024-05-02 | Stop reason: HOSPADM

## 2024-05-02 RX ORDER — EPHEDRINE SULFATE 50 MG/ML
INJECTION INTRAVENOUS AS NEEDED
Status: DISCONTINUED | OUTPATIENT
Start: 2024-05-02 | End: 2024-05-02 | Stop reason: SURG

## 2024-05-02 RX ORDER — LABETALOL HYDROCHLORIDE 5 MG/ML
5 INJECTION, SOLUTION INTRAVENOUS
Status: DISCONTINUED | OUTPATIENT
Start: 2024-05-02 | End: 2024-05-02 | Stop reason: HOSPADM

## 2024-05-02 RX ORDER — SODIUM CHLORIDE 0.9 % (FLUSH) 0.9 %
3-10 SYRINGE (ML) INJECTION AS NEEDED
Status: DISCONTINUED | OUTPATIENT
Start: 2024-05-02 | End: 2024-05-02 | Stop reason: HOSPADM

## 2024-05-02 RX ORDER — FENTANYL CITRATE 50 UG/ML
INJECTION, SOLUTION INTRAMUSCULAR; INTRAVENOUS AS NEEDED
Status: DISCONTINUED | OUTPATIENT
Start: 2024-05-02 | End: 2024-05-02 | Stop reason: SURG

## 2024-05-02 RX ADMIN — ONDANSETRON 4 MG: 2 INJECTION INTRAMUSCULAR; INTRAVENOUS at 10:07

## 2024-05-02 RX ADMIN — ONDANSETRON 4 MG: 2 INJECTION INTRAMUSCULAR; INTRAVENOUS at 09:00

## 2024-05-02 RX ADMIN — Medication 200 MG: at 08:42

## 2024-05-02 RX ADMIN — EPHEDRINE SULFATE 10 MG: 50 INJECTION INTRAVENOUS at 09:22

## 2024-05-02 RX ADMIN — SODIUM CHLORIDE, POTASSIUM CHLORIDE, SODIUM LACTATE AND CALCIUM CHLORIDE 9 ML/HR: 600; 310; 30; 20 INJECTION, SOLUTION INTRAVENOUS at 08:01

## 2024-05-02 RX ADMIN — EPHEDRINE SULFATE 15 MG: 50 INJECTION INTRAVENOUS at 09:01

## 2024-05-02 RX ADMIN — PROPOFOL 170 MG: 10 INJECTION, EMULSION INTRAVENOUS at 08:40

## 2024-05-02 RX ADMIN — INSULIN LISPRO 2 UNITS: 100 INJECTION, SOLUTION INTRAVENOUS; SUBCUTANEOUS at 11:32

## 2024-05-02 RX ADMIN — ROCURONIUM BROMIDE 10 MG: 10 INJECTION INTRAVENOUS at 08:39

## 2024-05-02 RX ADMIN — LIDOCAINE HYDROCHLORIDE 50 MG: 10 INJECTION, SOLUTION EPIDURAL; INFILTRATION; INTRACAUDAL; PERINEURAL at 08:39

## 2024-05-02 RX ADMIN — VANCOMYCIN HYDROCHLORIDE 1250 MG: 1.25 INJECTION, POWDER, LYOPHILIZED, FOR SOLUTION INTRAVENOUS at 08:09

## 2024-05-02 RX ADMIN — EPHEDRINE SULFATE 15 MG: 50 INJECTION INTRAVENOUS at 09:05

## 2024-05-02 RX ADMIN — FAMOTIDINE 20 MG: 20 TABLET, FILM COATED ORAL at 08:01

## 2024-05-02 RX ADMIN — FENTANYL CITRATE 100 MCG: 50 INJECTION, SOLUTION INTRAMUSCULAR; INTRAVENOUS at 08:37

## 2024-05-02 RX ADMIN — PROPOFOL 60 MG: 10 INJECTION, EMULSION INTRAVENOUS at 09:44

## 2024-05-02 RX ADMIN — LIDOCAINE HYDROCHLORIDE 0.5 ML: 10 INJECTION, SOLUTION EPIDURAL; INFILTRATION; INTRACAUDAL; PERINEURAL at 07:50

## 2024-05-02 NOTE — ANESTHESIA PROCEDURE NOTES
Airway  Urgency: elective    Date/Time: 5/2/2024 8:43 AM  Airway not difficult    General Information and Staff    Patient location during procedure: OR    Indications and Patient Condition  Indications for airway management: airway protection    Preoxygenated: yes  MILS not maintained throughout  Mask difficulty assessment: 0 - not attempted    Final Airway Details  Final airway type: endotracheal airway      Successful airway: ETT  Cuffed: yes   Successful intubation technique: video laryngoscopy and RSI  Facilitating devices/methods: intubating stylet  Endotracheal tube insertion site: oral  Blade: Briggs  Blade size: 3.5  ETT size (mm): 7.0  Cormack-Lehane Classification: grade I - full view of glottis  Placement verified by: chest auscultation and capnometry   Inital cuff pressure (cm H2O): 6  Measured from: lips  ETT/EBT  to lips (cm): 20  Number of attempts at approach: 1  Assessment: lips, teeth, and gum same as pre-op and atraumatic intubation    Additional Comments  Negative epigastric sounds, Breath sound equal bilaterally with symmetric chest rise and fall

## 2024-05-02 NOTE — INTERVAL H&P NOTE
[facility] Pre-op    Full history and physical note from office is attached.    VS: /88  HR 62  RR 16  T 98.2  Sat 95%RA    LAB Results:  Lab Results   Component Value Date    WBC 8.97 04/25/2024    HGB 12.5 04/25/2024    HCT 40.2 04/25/2024    MCV 85.9 04/25/2024     04/25/2024    NEUTROABS 5.4 11/29/2023    GLUCOSE 270 (H) 04/25/2024    BUN 21 04/25/2024    CREATININE 0.84 04/25/2024    EGFRIFNONA 95 02/25/2022     04/25/2024    K 4.3 04/25/2024    CL 99 04/25/2024    CO2 24.0 04/25/2024    CALCIUM 9.7 04/25/2024    ALBUMIN 4.4 11/29/2023    AST 22 11/29/2023    ALT 19 11/29/2023    BILITOT 0.3 11/29/2023    INR 1.03 04/25/2024       Cancer Staging (if applicable)  Cancer Patient: __ yes __no __unknown__N/A; If yes, clinical stage T:__ N:__M:__, stage group or __N/A      Impression: Overactive bladder; fecal incontinence       Plan: AXONICS STAGES 1 AND 2 LEAD AND GENERATOR PLACEMENT       Izzy Dominguez, JONO   5/2/2024   07:33 EDT

## 2024-05-02 NOTE — ANESTHESIA POSTPROCEDURE EVALUATION
Patient: Izabel Abreu    Procedure Summary       Date: 05/02/24 Room / Location:  [facility] /  [facility]    Anesthesia Start: 0833 Anesthesia Stop: 1044    Procedure: AXONICS STAGES 1 AND 2 LEAD AND GENERATOR PLACEMENT Diagnosis:       OAB (overactive bladder)      Incontinence of feces, unspecified fecal incontinence type      (OAB (overactive bladder) [N32.81])      (Incontinence of feces, unspecified fecal incontinence type [R15.9])    Surgeons: Christine Ryder MD Provider: Brody Mercado MD    Anesthesia Type: general ASA Status: 3            Anesthesia Type: general    Vitals  Vitals Value Taken Time   /58 05/02/24 1039   Temp 97 °F (36.1 °C) 05/02/24 1039   Pulse 60 05/02/24 1043   Resp 12 05/02/24 1039   SpO2 94 % 05/02/24 1043   Vitals shown include unfiled device data.        Post Anesthesia Care and Evaluation    Patient location during evaluation: PACU  Patient participation: complete - patient participated  Level of consciousness: responsive to noxious stimuli  Pain management: adequate    Airway patency: patent  Anesthetic complications: No anesthetic complications  PONV Status: none  Cardiovascular status: hemodynamically stable and acceptable  Respiratory status: nonlabored ventilation, acceptable, nasal cannula and oral airway  Hydration status: acceptable

## 2024-05-02 NOTE — ANESTHESIA PREPROCEDURE EVALUATION
Anesthesia Evaluation                  Airway   Mallampati: I  TM distance: >3 FB  Neck ROM: full  No difficulty expected  Dental      Pulmonary    (+) asthma,sleep apnea  Cardiovascular     ECG reviewed    (+) hypertension      Neuro/Psych  (+) psychiatric history  GI/Hepatic/Renal/Endo    (+) obesity, diabetes mellitus, thyroid problem     Musculoskeletal     Abdominal    Substance History      OB/GYN          Other   arthritis,                   Anesthesia Plan    ASA 3     general   Rapid sequence  (Diabetic semaglutide)  intravenous induction     Anesthetic plan, risks, benefits, and alternatives have been provided, discussed and informed consent has been obtained with: patient.    Plan discussed with CRNA.    CODE STATUS:

## 2024-05-02 NOTE — TELEPHONE ENCOUNTER
FOLLOW UP NOTE      2019    Patient Name: JASEN BOGGS  MRN:  [id number]  YOB: 1930  Diagnosis: IDC central portion of right breast ER/OK positive     REASON FOR VISIT:  The patient presents for routine follow-up regarding her history of breast cancer.  She has no new complaints.  She denies any swelling in either arm.  She denies any changes with her right chest wall or left breast.   Mammogram 3/2019 reviewed with patient and without abnormality     NARRATIVE: The patient is an 88-year-old [place] female who had noted some intermittent tenderness in her breast for years on and off. She mentioned this to her primary care physician who performed a breast exam and noted an irregularity behind the nipple on the right side. She was referred for mammogram with ultrasound which confirmed the presence of a right retroareolar lesion. The patient underwent an ultrasound-guided right breast core biopsy on 2014 which revealed a diagnosis of invasive ductal carcinoma, ER positive, OK positive, HER-2 negative.  The patient elected to proceed with a simple mastectomy as opposed to the lumpectomy, and underwent this procedure on 2014. The lesion was 1.4 cm in greatest dimension. All margins were negative. 3 sentinel nodes were also negative.  She then took aromatase inhibitor x 5 years without adverse event     ROS  complete 10 point review of systems is negative.    PAST MEDICAL HISTORY:  As above.    PAST SURGICAL HISTORY:  Status post hysterectomy with solitary oophorectomy in 1960 at age 31.    OBSTETRIC/GYNECOLOGIC HISTORY:   Menarche age 17;  4, para 3; LMP: age 31; surgical.   No prior oral contraceptive use. No prior hormone replacement therapy use.     CURRENT MEDICATIONS:  Medications, doses, and frequency reviewed with documentation in the electronic medical record.    ALLERGIES:NO KNOWN DRUG ALLERGIES    FAMILY HISTORY: The patient had a maternal aunt who  in her    Caller: VAISHALIADIEL HERRERA    Relationship: SELF    Best call back number: [phone] (home)       What is the best time to reach you: ANY    Who are you requesting to speak with (clinical staff, provider,  specific staff member): DR LEYVA OR STAFF    Do you know the name of the person who called: PT CALLED OFFICE    What was the call regarding: PATIENT STATES THAT WAS GIVEN NO DIRECTION OR SUPPLIES ABOUT WHAT TO DO WITH INCISION FROM SURGERY. PLEASE CALL PT PT TO ANSWER QUESTIONS. ALSO NEEDS KNOW ANY OTHER DIRECTIONS THAT SHE WILL NEEDS (IE WEIGHT, SLEEP POSITION, ETC.). PT OS ALSO ASKING TO GET A LATER TIME OF DAY FOR HER APPT. PLEASE CALL BACK TO DISCUSS. THANK YOU.       30s secondary to breast cancer. She had one sister who  at age 80 of either a stage IV uterine or ovarian cancer. She has a younger sister who  at age 55 of ovarian cancer. The patient was referred for genetic counseling, but this was not covered by her insurance so she elected not to proceed    SOCIAL HISTORY: The patient is , and retired. She formally worked in [employer]. She lives with her adult daughter. She previously smoked one pack per day for approximately 20 years but quit in     PHYSICAL EXAMINATION:  ECOG performance status 0  Today’s vital signs were reviewed in the electronic medical record.  Constitutional: Alert, cooperative.  Mood and affect appropriate. Appears younger than  chronological age. Well developed. Well nourished.   Head and face: Normocephalic, no scars.   Eyes:  Pupils equal and reactive; conjunctivae clear. Sclerae anicteric.    ENT:  Oropharynx clear and without erythema or exudates or mucositis.    Hematologic/Lymphatic:  No palpable cervical, axillary, lymphadenopathy. Breasts: status post  right mastectomy, the chest wall incision is well-healed.  No tenderness to palpation. No  palpable abnormalities within the left breast or axilla.  Lungs:  Normal respiratory effort.  Lungs bilaterally clear to auscultation.    Cardiovascular:  Regular rate and rhythm of heart without murmurs, rubs, or gallops.    Abdomen:  Soft, nontender, nondistended without palpable masses.  No hepatosplenomegaly.  Musculoskeletal: No lymphedema. No clubbing.    Extremities: No calf tenderness. No peripheral edema. No swelling of either arm.  Psychiatric: Coherent speech, Verbalizes understanding of our discussions today.     PATHOLOGY:  2014: Ultrasound-guided core biopsy of the right breast: Invasive ductal carcinoma, Janessa grade 2. ER positive, AK positive, HER-2 negative.    2014: Simple right mastectomy: Solitary focus of infiltrating ductal carcinoma, with ductal  carcinoma in situ. Total measurement of lesion was 1.4 cm. All margins were negative. 3 sentinel nodes were all negative.    RADIOLOGY:  3/2019 mammogram left screening: No sonographic evidence of malignancy. One-year screening mammogram is recommended.    ASSESSMENT AND PLAN:   The patient is a 88-year-old woman with a newly diagnosed right invasive ductal carcinoma.    1. Stage IA (T1c, N0, M0) Invasive ductal carcinoma ER positive, KY positive, HER-2 negative. Associated DCIS was also noted. She is status post simple mastectomy. No radiation therapy or adjuvant chemotherapy given. Completed 5 years of arimidex.  She is due to have repeat mammogram in March 2020.  The patient is very concerned about higher co-pay due to facility fees. I offered her to see one of our partners at [facility] or [facility]. She would prefer to see her PCP and get annual exams. She will return only if an abnormality arises.   2. Osteopenia: the patient is currently taking a calcium with vitamin D supplement, 600 mg twice a day   3. Osteoarthritis: she reports improvement in symptoms with exercise, calcium and vitamin D supplements, and the use of Tylenol with codeine.     PLANNED FOLLOW UP:  Mammogram in March 2020  Follow up with PCP for breast exam per patient preference   RTC if abnormality arises on mammogram or breast exam     Signed Electronically by   Kandace Che M.D.  Hematology/Oncology  [facility]   [facility]    cc: Kelsea Kessler M.D.;

## 2024-05-02 NOTE — OP NOTE
[facility]   [address] [place] [id number]      OPERATIVE REPORT - AXONICS FULL IMPLANT (STAGE 1+2)    Patient Name:  Izabel Abreu  YOB: 1955    Patient MRN: [id number]    Date of Surgery:  5/2/2024     Indications:  69 yo female with a history of OAB, urge incontinence, and fecal incontinence.  Underwent PNE and had success.  She is here today for full implant.      Pre-op Diagnosis:   Urgency Incontinence  Overactive Bladder  Urgency/Frequency  Fecal Incontinence    Post-op Diagnosis:   Urgency Incontinence  Overactive Bladder  Urgency/Frequency  Fecal Incontinence    Procedure/CPT® Codes:  87843  09588    Staff:  Christine Ryder MD    Anesthesia: General    Estimated Blood Loss: Minimal    Implants:  Tined lead wire and implantable Axonics generator/neurostimulator    Specimen:  None    Drains: None      Findings: Placement of lead wire in the S4 foramen confirmed with fluoroscopy and appropriate placement of the neurostimulator (INS) in the right upper buttock.  Several attempts were made to place the lead wire in the S3 foramen however, we were never able to get good responses and S3.  So decision was made to move to the S4 foramen.    Complications: None I'm mediate    Description of Procedure:   After surgical consent was reviewed with the patient, including risks, benefits and alternatives, the patient expressed desire to proceed with surgery. She was transported back to the OR and placed supine. She was intubated and sedated. She was then placed in prone position.     Pillows were placed in the lower abdomen and hips to level and flatten the sacrum and allow the toes to dangle freely.  Intravenous Vancomycin was infused 30 minutes prior to incision for infection prophylaxis. A ground pad was placed on the bottom of the patient's foot and was connected to the Clinician  along with the test stimulation cable (J-hook). Tape was used on the buttocks  to expose the anus and allow for observation of the justin response. The patient was then prepped and draped in the usual fashion.   Preoperative fluoroscopy was performed to visualize the sacral anatomy.    We placed needles in the right S3 foramen.  The needle was advanced to the tip of the foramen and the J-hook was then placed on the insulated portion of the foramen needle.  Stimulation was applied, however, we were not able to get good responses.  Several attempts were made to change the angle and depth of the needle without any response.  We then placed a needle on the left S3 foramen and again repeated the steps and were never able to get good responses.  On the right side after several attempts were able to get toe but no bellow.  This was dilated and a lead was placed.  However, there were not good responses with lead placement in 2 out of the 4 leads.  Despite changing angles we never achieved good responses.  At this point the decision was made to attempt the S4 level.  We were able to get excellent responses and S4 with good justin.  Attention was turned to placement of the lead wire using the neoSurgical System. The level of S4 and the medial border of the sacral foramen were identified bilaterally using fluoroscopy in the AP view. After administration of local anesthesia, a foramen needle was placed in the superior, medial aspect of the  S4 foramen such that the needle was parallel to medial border of the foramen. A lateral fluoroscopic image was then obtained to ensure the needle entry point was approximately 1 cm above and parallel to the fusion plate of S4. The needle was advanced such that the tip of the foramen needle was just at the anterior aspect of the sacrum. The J-hook was then placed on the uninsulated portion of the foramen needle and stimulation applied to obtain the opening threshold amplitude. There was noted to be a positive justin response at low mA, followed by great toe flexion at  low mA.     Once the ideal location was confirmed, a small incision was made at the foramen needle to accommodate the lead wire and tunneling tool. The directional guide was placed through the foramen needle and the needle removed. The introducer was placed over the directional guide and advanced under live fluoroscopy such that the radiopaque marker was placed half-way through the sacral plate. The dilator was removed along with the directional guide. Using live fluoroscopy, the tined lead with the curved stylet was placed through the introducer until electrode three straddled the anterior surface of the sacrum and ensuring the lead had a gentle downward trajectory. The stimulation clip was then placed on the distal lead and each electrode was tested observing for a motor response. After satisfactory lead positioning was confirmed, the introducer was retracted over the lead under continuous fluoroscopy, deploying the tines into presacral tissue. Stimulation thresholds were established using the least amount of stimulation required to obtain a motor response.     A tunneling tool with an overlying plastic sheath was inserted from the lead insertion site subcutaneously to the new INS (implantable neurostimulator) pocket site which had been previously marked to be located in the upper buttocks. After administration of local anesthetic, a 2.5 cm incision was made lateral to the site of INS placement site, to a depth no greater than 2cm deep to the skin. A pocket was created to accommodate the INS using combination of sharp and blunt dissection, maintaining the depth of 2 cm. The tunneling tool was passed from the lead wire insertion site to the lateral pocket. The sharp tip of the tunneling tool was removed and the lead was fed through the sheath, exiting at the pocket site. The sheath was removed. The lead was cleaned and dried and connected to the MuteButton INS by inserting the lead into the header of the  neurostimulator until the white tip was visualized in the strain relief. The single set screw was tightened using the torque wrench    The neurostimulator was placed into the pocket with ceramic window placed laterally and the lead connection placed superiorly. Any excessive lead was coiled and placed behind the neurostimulator. Impedances were confirmed to be within normal limits.    The incisions were irrigated with antibiotic solution in sterile water and the INS incision was closed with 3-0 Vicryl for the subcutaneous layer, skin was closed with running 3-0 Monocryl skin sutures.Skin incisions were covered with skin glue, and gauze were placed over the incision and covered with transparent dressing.    Instrument, sponge, and needle counts were correct times two.    Disposition/Follow Up: The patient will follow-up in 2 weeks for wound check.  She was given prescription for Bactrim and tramadol.    Christine Ryder MD     Date: 5/2/2024  Time: 10:43 EDT

## 2024-05-03 ENCOUNTER — TELEPHONE (OUTPATIENT)
Dept: UROLOGY | Facility: CLINIC | Age: 69
End: 2024-05-03
Payer: COMMERCIAL

## 2024-05-05 DIAGNOSIS — N32.81 OAB (OVERACTIVE BLADDER): ICD-10-CM

## 2024-05-06 ENCOUNTER — TELEPHONE (OUTPATIENT)
Dept: UROLOGY | Facility: CLINIC | Age: 69
End: 2024-05-06
Payer: COMMERCIAL

## 2024-05-06 RX ORDER — OXYBUTYNIN CHLORIDE 10 MG/1
10 TABLET, EXTENDED RELEASE ORAL DAILY
Qty: 30 TABLET | Refills: 2 | Status: SHIPPED | OUTPATIENT
Start: 2024-05-06

## 2024-05-08 ENCOUNTER — TELEPHONE (OUTPATIENT)
Dept: UROLOGY | Facility: CLINIC | Age: 69
End: 2024-05-08
Payer: COMMERCIAL

## 2024-05-10 ENCOUNTER — TELEPHONE (OUTPATIENT)
Dept: ENDOCRINOLOGY | Facility: CLINIC | Age: 69
End: 2024-05-10

## 2024-05-10 ENCOUNTER — PRIOR AUTHORIZATION (OUTPATIENT)
Dept: ENDOCRINOLOGY | Facility: CLINIC | Age: 69
End: 2024-05-10
Payer: COMMERCIAL

## 2024-05-10 RX ORDER — TIRZEPATIDE 7.5 MG/.5ML
INJECTION, SOLUTION SUBCUTANEOUS
Qty: 2 ML | Refills: 0 | Status: SHIPPED | OUTPATIENT
Start: 2024-05-10

## 2024-05-10 NOTE — TELEPHONE ENCOUNTER
Caller: Izabel Abreu    Relationship: Self    Best call back number: [phone]    Requested Prescriptions: Mounjaro 7.5 MG/0.5ML solution pen-injector pen   Requested Prescriptions      No prescriptions requested or ordered in this encounter        Pharmacy where request should be sent: [facility]- [address] [place] [address]/ # [phone]    Last office visit with prescribing clinician: 3/29/2024   Last telemedicine visit with prescribing clinician: Visit date not found   Next office visit with prescribing clinician: 11/1/2024     Additional details provided by patient: PT WOULD LIKE TO GO BACK TO THE MOUNJARO BUT IS WANDERING IF IT'S OK TO TAKE IT OR SHOULD SHE STAY ON THE OZEMPIC. PLEASE CALL THE PT BACK. PT HAS QUESTIONS ABOUT THE OZEMPIC.     Does the patient have less than a 3 day supply:  [] Yes  [x] No    Would you like a call back once the refill request has been completed: [x] Yes [] No    If the office needs to give you a call back, can they leave a voicemail: [x] Yes [] No    Claudine Álvarez Rep   05/10/24 10:55 EDT

## 2024-05-14 ENCOUNTER — OFFICE VISIT (OUTPATIENT)
Age: 69
End: 2024-05-14
Payer: COMMERCIAL

## 2024-05-14 VITALS — HEIGHT: 62 IN | BODY MASS INDEX: 32.39 KG/M2 | WEIGHT: 176 LBS

## 2024-05-14 DIAGNOSIS — N32.81 OAB (OVERACTIVE BLADDER): Primary | ICD-10-CM

## 2024-05-30 DIAGNOSIS — F41.1 GAD (GENERALIZED ANXIETY DISORDER): ICD-10-CM

## 2024-05-30 RX ORDER — SERTRALINE HYDROCHLORIDE 100 MG/1
100 TABLET, FILM COATED ORAL DAILY
Qty: 90 TABLET | Refills: 1 | Status: SHIPPED | OUTPATIENT
Start: 2024-05-30

## 2024-06-03 RX ORDER — CHOLECALCIFEROL (VITAMIN D3) 1250 MCG
50000 CAPSULE ORAL
Qty: 12 CAPSULE | Refills: 1 | Status: SHIPPED | OUTPATIENT
Start: 2024-06-03

## 2024-06-06 ENCOUNTER — TELEPHONE (OUTPATIENT)
Dept: UROLOGY | Facility: CLINIC | Age: 69
End: 2024-06-06
Payer: COMMERCIAL

## 2024-06-06 DIAGNOSIS — T81.41XA INFECTION OF SUPERFICIAL INCISIONAL SURGICAL SITE AFTER PROCEDURE, INITIAL ENCOUNTER: Primary | ICD-10-CM

## 2024-06-06 RX ORDER — SULFAMETHOXAZOLE AND TRIMETHOPRIM 800; 160 MG/1; MG/1
1 TABLET ORAL 2 TIMES DAILY
Qty: 20 TABLET | Refills: 0 | Status: SHIPPED | OUTPATIENT
Start: 2024-06-06 | End: 2024-06-16

## 2024-06-06 NOTE — TELEPHONE ENCOUNTER
Lvm for pt about her appointment tomorrow. I confirmed through VM that it is at the [place] location and left the address for her as well. I let her know if she had any further questions to please let us know either by calling the office or sending us a [employer] message.

## 2024-06-06 NOTE — TELEPHONE ENCOUNTER
[facility] staff attempted to follow warm transfer process and was unsuccessful     Caller: Izabel Abreu    Relationship to patient: Self    Best call back number: [phone]    Patient is needing: RECEIVED A CALL FROM PT. SHE CALLED ASKING ABOUT TOMORROW'S APPOINTMENT. SHE IS WILLING TO TRAVEL TO [facility] OR [facility]. [facility]  SCHEDULED APPOINTMENT WITH FAINA BORJAS TOMORROW AT [place] AT 11:30 AM. OFFICE PLEASE ADVISE OK TO KEEP APPOINTMENT WITH JIMI TOMORROW?

## 2024-06-07 ENCOUNTER — OFFICE VISIT (OUTPATIENT)
Dept: UROLOGY | Facility: CLINIC | Age: 69
End: 2024-06-07
Payer: COMMERCIAL

## 2024-06-07 VITALS — BODY MASS INDEX: 32.2 KG/M2 | HEIGHT: 62 IN | WEIGHT: 175 LBS

## 2024-06-07 DIAGNOSIS — T81.89XA DELAYED SURGICAL WOUND HEALING, INITIAL ENCOUNTER: Primary | ICD-10-CM

## 2024-06-07 PROCEDURE — 99024 POSTOP FOLLOW-UP VISIT: CPT | Performed by: PHYSICIAN ASSISTANT

## 2024-06-07 NOTE — PROGRESS NOTES
Follow Up Office Visit      Patient Name: Izabel Abreu  : 1955   MRN: [id number]     Chief Complaint:    Chief Complaint   Patient presents with    Wound Check       Referring Provider: No ref. provider found    History of Present Illness: Izabel Abreu is a 68 y.o. female who presents today for wound check.  She comes today with her sister.  Status post Axonics neurostimulator for urge and fecal incontinence refractory to medical management on 2024.  She has been having good control of her symptoms since the device was implanted.  She states that over the last 3 weeks or so, she has had an area of delayed wound healing over the battery pack incision.  She is unclear exactly when this portion of her wound opened up.  She denies ever having any drainage from the wound, or being able to see any of the hardware.  She denies any constitutional symptoms including fevers and chills.  She has been doing local wound care, cleaning the incision sites with soap and water, as well as putting Aquaphor over the incision.  She called the office yesterday concerned about the wound.  She was given an order for Bactrim and scheduled to come in today for wound check.    Subjective      Review of System: Review of Systems   Constitutional:  Negative for appetite change, chills, fatigue and fever.   Gastrointestinal:  Negative for nausea and vomiting.   Genitourinary:  Negative for dysuria.      I have reviewed the ROS documented by my clinical staff, I have updated appropriately and I agree. Kenyon Mcgrath PA-C    I have reviewed and the following portions of the patient's history were updated as appropriate: past family history, past medical history, past social history, past surgical history and problem list.    Medications:     Current Outpatient Medications:     acetaminophen (TYLENOL) 650 MG 8 hr tablet, Take 2 tablets by mouth Every 8 (Eight) Hours As Needed for Mild Pain., Disp: , Rfl:      Alpha-D-Galactosidase (Beano) tablet, Take 2 doses by mouth 3 (Three) Times a Day As Needed (FLATULENCE)., Disp: , Rfl:     amLODIPine (NORVASC) 5 MG tablet, TAKE 1 TABLET BY MOUTH DAILY, Disp: 90 tablet, Rfl: 1    bisoprolol (ZEBeta) 10 MG tablet, Take 1 tablet by mouth Daily., Disp: 90 tablet, Rfl: 1    Cholecalciferol (Vitamin D3) 1.25 MG (75093 UT) capsule, Take 1 capsule by mouth Every 7 (Seven) Days., Disp: 12 capsule, Rfl: 1    clobetasol (TEMOVATE) 0.05 % ointment, Apply 1 Application topically to the appropriate area as directed 3 (Three) Times a Day As Needed. As needed, Disp: , Rfl:     estradiol (ESTRACE) 0.1 MG/GM vaginal cream, Insert 1 g into the vagina 3 (Three) Times a Week if Needed (VAGINAL ATROPHY)., Disp: , Rfl:     fenofibrate micronized (LOFIBRA) 134 MG capsule, TAKE 1 CAPSULE BY MOUTH DAILY, Disp: 90 capsule, Rfl: 1    fluticasone (FLONASE) 50 MCG/ACT nasal spray, 2 sprays into the nostril(s) as directed by provider Daily As Needed for Rhinitis or Allergies., Disp: , Rfl:     glucose blood (OneTouch Verio) test strip, USE TO TEST BLOOD SUGARS DAILY. DX CODE E11.65, Disp: 100 each, Rfl: 3    losartan (COZAAR) 100 MG tablet, Take 1 tablet by mouth Daily., Disp: 90 tablet, Rfl: 1    meloxicam (MOBIC) 15 MG tablet, TAKE 1 TABLET BY MOUTH DAILY, Disp: 30 tablet, Rfl: 0    metFORMIN (GLUCOPHAGE) 1000 MG tablet, TAKE 1/2 TABLET TWICE A DAY, Disp: 90 tablet, Rfl: 3    methocarbamol (ROBAXIN) 750 MG tablet, Take 1 tablet by mouth 3 (Three) Times a Day., Disp: 90 tablet, Rfl: 1    olopatadine (PATADAY) 0.2 % solution ophthalmic solution, Administer 1 drop to both eyes Daily., Disp: , Rfl:     oxybutynin XL (DITROPAN-XL) 10 MG 24 hr tablet, TAKE 1 TABLET BY MOUTH DAILY, Disp: 30 tablet, Rfl: 2    pancrelipase, Lip-Prot-Amyl, (CREON) 31136-81937 units capsule delayed-release particles capsule, Take 3 capsules by mouth 3 (Three) Times a Day With Meals., Disp: , Rfl:     promethazine (PHENERGAN) 25 MG  "tablet, Take 1 tablet by mouth Every 8 (Eight) Hours As Needed for Nausea or Vomiting., Disp: 30 tablet, Rfl: 0    sertraline (ZOLOFT) 100 MG tablet, Take 1 tablet by mouth Daily., Disp: 90 tablet, Rfl: 1    simvastatin (ZOCOR) 40 MG tablet, TAKE 1 TABLET BY MOUTH DAILY (Patient taking differently: Take 1 tablet by mouth Every Night.), Disp: 90 tablet, Rfl: 1    sulfamethoxazole-trimethoprim (Bactrim DS) 800-160 MG per tablet, Take 1 tablet by mouth 2 (Two) Times a Day for 10 days., Disp: 20 tablet, Rfl: 0    Tirzepatide (Mounjaro) 7.5 MG/0.5ML solution pen-injector pen, INJECT 7.5 MG UNDER THE SKIN ONCE WEEKLY, Disp: 2 mL, Rfl: 0    traMADol (Ultram) 50 MG tablet, Take 1 tablet by mouth Every 8 (Eight) Hours As Needed for Severe Pain., Disp: 10 tablet, Rfl: 0    traMADol (Ultram) 50 MG tablet, Take 1 tablet by mouth Every 8 (Eight) Hours As Needed for Severe Pain., Disp: 12 tablet, Rfl: 0    vitamin B-12 (CYANOCOBALAMIN) 1000 MCG tablet, Take 1 tablet by mouth Daily., Disp: , Rfl:     Allergies:   Allergies   Allergen Reactions    Diflucan [Fluconazole] Headache    Azithromycin GI Intolerance    Codeine Itching    Hydrocodone-Acetaminophen Hives    Latex Itching    Penicillins Hives    Quinapril Other (See Comments)     cough       Objective     Physical Exam:   Vital Signs:   Vitals:    06/07/24 0954   Weight: 79.4 kg (175 lb)   Height: 157.5 cm (62\")   PainSc:   2   PainLoc: Incisional     Body mass index is 32.01 kg/m².     Physical Exam  Constitutional:       Appearance: Normal appearance.   HENT:      Head: Normocephalic and atraumatic.      Nose: Nose normal.      Mouth/Throat:      Mouth: Mucous membranes are moist.      Pharynx: Oropharynx is clear.   Eyes:      Extraocular Movements: Extraocular movements intact.      Pupils: Pupils are equal, round, and reactive to light.   Pulmonary:      Effort: Pulmonary effort is normal. No respiratory distress.   Musculoskeletal:         General: No swelling or " deformity. Normal range of motion.      Cervical back: Normal range of motion and neck supple.   Skin:     General: Skin is warm and dry.      Comments: Approximately 2 cm superficial dehiscence of the lateral right buttocks incision.  Mild induration around the incision site.  No hardware visible.  No warmth or drainage.  Not malodorous.   Neurological:      General: No focal deficit present.      Mental Status: She is alert and oriented to person, place, and time. Mental status is at baseline.   Psychiatric:         Mood and Affect: Mood normal.         Behavior: Behavior normal.         Labs:   Brief Urine Lab Results  (Last result in the past 365 days)        Color   Clarity   Blood   Leuk Est   Nitrite   Protein   CREAT   Urine HCG        03/22/24 1502 Yellow   Clear   Negative   Negative   Negative   Negative                        Lab Results   Component Value Date    GLUCOSE 270 (H) 04/25/2024    CALCIUM 9.7 04/25/2024     04/25/2024    K 4.3 04/25/2024    CO2 24.0 04/25/2024    CL 99 04/25/2024    BUN 21 04/25/2024    CREATININE 0.84 04/25/2024    EGFRIFNONA 95 02/25/2022    BCR 25.0 04/25/2024    ANIONGAP 14.0 04/25/2024       Lab Results   Component Value Date    WBC 8.97 04/25/2024    HGB 12.5 04/25/2024    HCT 40.2 04/25/2024    MCV 85.9 04/25/2024     04/25/2024       Images:   No Images in the past 120 days found..    Measures:   Tobacco:   Izabel Abreu  reports that she has never smoked. She has been exposed to tobacco smoke. She has never used smokeless tobacco.     Assessment / Plan      Assessment/Plan:   68 y.o. female who presented today for wound check.  She is status post placement of an [employer]on[employer]ker[employer] neurostimulator for urinary and fecal incontinence on 5/2/2024.  She has been doing well with the device, however she has had an area of delayed wound healing of the lateral 2 cm of the right buttocks incision.  It does not look overtly infected.  It is superficial.  There is no  exposed hardware.  Patient denies ever having any drainage from the incision.  Patient states that there is been no real changes to it over the last 2 or 3 weeks.  She has not had any constitutional symptoms.  I had Dr. Downey evaluate as well.  We had discussion with patient about options.  Most aggressive would be to take her back today for explant with wound washout and closure.  Given the appearance of her wound, and lack of clear signs of infection, we agree that it would be safe to continue to watch for now.  She will continue to take the Bactrim she started yesterday.  Will have her return to the office within a week for another wound check.  She may continue to shower, however not to specifically clean the wound.  After shower, she should pat the area dry and leave open to air.  Strict return precautions were discussed.    Diagnoses and all orders for this visit:    1. Delayed surgical wound healing, initial encounter (Primary)         Follow Up:   Return in about 1 week (around 6/14/2024) for f/u wi/i 1 week for wound check imelda or me.    I spent approximately 30 minutes providing clinical care for this patient; including review of patient's chart and provider documentation, face to face time spent with patient in examination room (obtaining history, performing physical exam, discussing diagnosis and management options), placing orders, and completing patient documentation.     Kenyon Mcgrath PA-C  [facility]

## 2024-06-12 ENCOUNTER — OFFICE VISIT (OUTPATIENT)
Dept: UROLOGY | Facility: CLINIC | Age: 69
End: 2024-06-12
Payer: COMMERCIAL

## 2024-06-12 VITALS — BODY MASS INDEX: 32.2 KG/M2 | WEIGHT: 175 LBS | HEIGHT: 62 IN

## 2024-06-12 DIAGNOSIS — N32.81 OAB (OVERACTIVE BLADDER): Primary | ICD-10-CM

## 2024-06-12 PROCEDURE — 99213 OFFICE O/P EST LOW 20 MIN: CPT | Performed by: UROLOGY

## 2024-06-12 NOTE — PROGRESS NOTES
Follow Up Office Visit      Patient Name: Izabel Abreu  : 1955   MRN: [id number]     Chief Complaint:    Chief Complaint   Patient presents with    Wound Check       Referring Provider: No ref. provider found    History of Present Illness: Izabel Abreu is a 68 y.o. female who presents today for follow up after Axonics full implant.  She has done well over all.  She reports urinary and bowel incontinence has improved dramatically.  She was seen previously and has since improved.    She has good healing with an approx 5 mm scab on the lateral side of her incision.    No drainage.  No erythema.  Nontender    Subjective      Review of System:   Review of Systems   I have reviewed the ROS documented by my clinical staff, I have updated appropriately and I agree. Christine Ryder MD    I have reviewed and the following portions of the patient's history were updated as appropriate: past family history, past medical history, past social history, past surgical history and problem list.    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    Type 2 diabetes mellitus     Urinary incontinence     Urinary tract infection Oct 2023    Took antibiotic    Vaginal infection     Vitamin D deficiency        Past Surgical History:  Past Surgical History:   Procedure Laterality Date     SECTION      COLON SURGERY      Colonoscopy    COLONOSCOPY      SCHEDULED FOR     D &  C AND LAPAROSCOPY      INTERSTIM PLACEMENT N/A 05/02/2024    Procedure: AXONICS STAGES 1 AND 2 LEAD AND GENERATOR PLACEMENT;  Surgeon: Christine Ryder MD;  Location: [facility] OR;  Service: Urology;  Laterality: N/A;    OOPHORECTOMY  2021    Removal of both ovaries & both fallopian tubes    OVARY SURGERY      Bilateral Salpino Oophorectomy and bilateral Salpingectomy    PARATHYROIDECTOMY N/A 07/16/2021    Procedure: PARATHYROIDECTOMY;  Surgeon: Apolinar Suh MD;  Location: [facility] OR;  Service: General;  Laterality: N/A;    SHOULDER SURGERY Right     Rotator cuff    SINUS SURGERY      TOE SURGERY      TUBAL ABDOMINAL LIGATION Bilateral        Family History:   family history includes Cancer in her mother; Diabetes in her father and mother; Hypertension in her brother, father, mother, and sister; Thyroid disease in her sister.   Otherwise pertinent FHx was reviewed and not pertinent to current issue.    Social History:    reports that she has never smoked. She has been exposed to tobacco smoke. She has never used smokeless tobacco. She reports that she does not currently use alcohol. She reports that she does not use drugs.    Medications:     Current Outpatient Medications:     acetaminophen (TYLENOL) 650 MG 8 hr tablet, Take 2 tablets by mouth Every 8 (Eight) Hours As Needed for Mild Pain., Disp: , Rfl:     Alpha-D-Galactosidase (Beano) tablet, Take 2 doses by mouth 3 (Three) Times a Day As Needed (FLATULENCE)., Disp: , Rfl:     amLODIPine (NORVASC) 5 MG tablet, TAKE 1 TABLET BY MOUTH DAILY, Disp: 90 tablet, Rfl: 1    bisoprolol (ZEBeta) 10 MG tablet, Take 1 tablet by mouth Daily., Disp: 90 tablet, Rfl: 1    Cholecalciferol (Vitamin D3) 1.25 MG (74893 UT) capsule, Take 1 capsule by mouth Every 7 (Seven) Days., Disp: 12 capsule, Rfl: 1    clobetasol (TEMOVATE) 0.05 % ointment, Apply 1 Application topically to the appropriate area as directed 3 (Three) Times a Day As Needed. As needed, Disp: , Rfl:     estradiol  (ESTRACE) 0.1 MG/GM vaginal cream, Insert 1 g into the vagina 3 (Three) Times a Week if Needed (VAGINAL ATROPHY)., Disp: , Rfl:     fenofibrate micronized (LOFIBRA) 134 MG capsule, TAKE 1 CAPSULE BY MOUTH DAILY, Disp: 90 capsule, Rfl: 1    fluticasone (FLONASE) 50 MCG/ACT nasal spray, 2 sprays into the nostril(s) as directed by provider Daily As Needed for Rhinitis or Allergies., Disp: , Rfl:     glucose blood (OneTouch Verio) test strip, USE TO TEST BLOOD SUGARS DAILY. DX CODE E11.65, Disp: 100 each, Rfl: 3    losartan (COZAAR) 100 MG tablet, Take 1 tablet by mouth Daily., Disp: 90 tablet, Rfl: 1    meloxicam (MOBIC) 15 MG tablet, TAKE 1 TABLET BY MOUTH DAILY, Disp: 30 tablet, Rfl: 0    metFORMIN (GLUCOPHAGE) 1000 MG tablet, TAKE 1/2 TABLET TWICE A DAY, Disp: 90 tablet, Rfl: 3    methocarbamol (ROBAXIN) 750 MG tablet, Take 1 tablet by mouth 3 (Three) Times a Day., Disp: 90 tablet, Rfl: 1    olopatadine (PATADAY) 0.2 % solution ophthalmic solution, Administer 1 drop to both eyes Daily., Disp: , Rfl:     oxybutynin XL (DITROPAN-XL) 10 MG 24 hr tablet, TAKE 1 TABLET BY MOUTH DAILY, Disp: 30 tablet, Rfl: 2    pancrelipase, Lip-Prot-Amyl, (CREON) 49752-61734 units capsule delayed-release particles capsule, Take 3 capsules by mouth 3 (Three) Times a Day With Meals., Disp: , Rfl:     promethazine (PHENERGAN) 25 MG tablet, Take 1 tablet by mouth Every 8 (Eight) Hours As Needed for Nausea or Vomiting., Disp: 30 tablet, Rfl: 0    sertraline (ZOLOFT) 100 MG tablet, Take 1 tablet by mouth Daily., Disp: 90 tablet, Rfl: 1    simvastatin (ZOCOR) 40 MG tablet, TAKE 1 TABLET BY MOUTH DAILY (Patient taking differently: Take 1 tablet by mouth Every Night.), Disp: 90 tablet, Rfl: 1    sulfamethoxazole-trimethoprim (Bactrim DS) 800-160 MG per tablet, Take 1 tablet by mouth 2 (Two) Times a Day for 10 days., Disp: 20 tablet, Rfl: 0    Tirzepatide (Mounjaro) 7.5 MG/0.5ML solution pen-injector pen, INJECT 7.5 MG UNDER THE SKIN ONCE WEEKLY,  "Disp: 2 mL, Rfl: 0    traMADol (Ultram) 50 MG tablet, Take 1 tablet by mouth Every 8 (Eight) Hours As Needed for Severe Pain., Disp: 10 tablet, Rfl: 0    traMADol (Ultram) 50 MG tablet, Take 1 tablet by mouth Every 8 (Eight) Hours As Needed for Severe Pain., Disp: 12 tablet, Rfl: 0    vitamin B-12 (CYANOCOBALAMIN) 1000 MCG tablet, Take 1 tablet by mouth Daily., Disp: , Rfl:     Allergies:   Allergies   Allergen Reactions    Diflucan [Fluconazole] Headache    Azithromycin GI Intolerance    Codeine Itching    Hydrocodone-Acetaminophen Hives    Latex Itching    Penicillins Hives    Quinapril Other (See Comments)     cough         Objective     Physical Exam:   Vital Signs:   Vitals:    06/12/24 0857   Weight: 79.4 kg (175 lb)   Height: 157.5 cm (62\")   PainSc: 0-No pain     Body mass index is 32.01 kg/m².     Physical Exam  Vitals and nursing note reviewed. Exam conducted with a chaperone present.   Constitutional:       General: She is awake. She is not in acute distress.     Appearance: Normal appearance.   HENT:      Head: Normocephalic and atraumatic.      Right Ear: External ear normal.      Left Ear: External ear normal.      Nose: Nose normal.   Eyes:      Conjunctiva/sclera: Conjunctivae normal.   Pulmonary:      Effort: Pulmonary effort is normal. No respiratory distress.   Abdominal:      General: Abdomen is flat. There is no distension.      Palpations: Abdomen is soft. There is no mass.      Tenderness: There is no abdominal tenderness. There is no right CVA tenderness, left CVA tenderness, guarding or rebound.      Hernia: No hernia is present.   Genitourinary:     Exam position: Lithotomy position.   Skin:     General: Skin is warm.   Neurological:      General: No focal deficit present.      Mental Status: She is alert and oriented to person, place, and time.      Gait: Gait normal.   Psychiatric:         Behavior: Behavior normal. Behavior is cooperative.         Thought Content: Thought content normal. " "        Judgment: Judgment normal.         Labs:   Brief Urine Lab Results  (Last result in the past 365 days)        Color   Clarity   Blood   Leuk Est   Nitrite   Protein   CREAT   Urine HCG        03/22/24 1502 Yellow   Clear   Negative   Negative   Negative   Negative                        Lab Results   Component Value Date    GLUCOSE 270 (H) 04/25/2024    CALCIUM 9.7 04/25/2024     04/25/2024    K 4.3 04/25/2024    CO2 24.0 04/25/2024    CL 99 04/25/2024    BUN 21 04/25/2024    CREATININE 0.84 04/25/2024    EGFRIFNONA 95 02/25/2022    BCR 25.0 04/25/2024    ANIONGAP 14.0 04/25/2024       Lab Results   Component Value Date    WBC 8.97 04/25/2024    HGB 12.5 04/25/2024    HCT 40.2 04/25/2024    MCV 85.9 04/25/2024     04/25/2024       No results found for: \"PSA\"    Images:   No Images in the past 120 days found..    Measures:   Tobacco:   Izabel Abreu  reports that she has never smoked. She has been exposed to tobacco smoke. She has never used smokeless tobacco.     Urine Incontinence: Patient reports that she is not currently experiencing any symptoms of urinary incontinence.         Assessment / Plan      Assessment/Plan:   68 y.o. female who presented today for follow up after Axonics full implant.  Her wound has started healing nicely.  She will finish her antibiotics.    I will see her back in 4 weeks.      Diagnoses and all orders for this visit:    1. OAB (overactive bladder) (Primary)         Follow Up:   Return in about 4 weeks (around 7/10/2024) for Recheck.    I spent approximately 20 minutes providing clinical care for this patient; including review of patient's chart and provider documentation, face to face time spent with patient in examination room (obtaining history, performing physical exam, discussing diagnosis and management options), placing orders, and completing patient documentation.     Christine Ryder MD  [facility] [place]  "

## 2024-06-12 NOTE — PROGRESS NOTES
Follow Up Office Visit      Patient Name: Izabel Abreu  : 1955   MRN: [id number]     Chief Complaint:    Chief Complaint   Patient presents with    Wound Check       Referring Provider: No ref. provider found    History of Present Illness: Izabel Abreu is a 68 y.o. female who presents today for follow up of  ***    Subjective      Review of System:   Review of Systems   I have reviewed the ROS documented by my clinical staff, I have updated appropriately and I agree. Kadi Martinez MA    I have reviewed and the following portions of the patient's history were updated as appropriate: past family history, past medical history, past social history, past surgical history and problem list.    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    Type 2 diabetes mellitus     Urinary incontinence     Urinary tract infection Oct 2023    Took antibiotic    Vaginal infection     Vitamin D deficiency        Past Surgical History:  Past Surgical History:   Procedure Laterality Date     SECTION      COLON SURGERY      Colonoscopy    COLONOSCOPY      SCHEDULED FOR     D & C AND LAPAROSCOPY      INTERSTIM PLACEMENT N/A 2024    Procedure: AXONICS STAGES 1 AND 2 LEAD AND GENERATOR PLACEMENT;  Surgeon: Christine Ryder MD;  Location: Replaced by [facility];  Service: Urology;  Laterality: N/A;    OOPHORECTOMY      Removal of both ovaries & both fallopian tubes    OVARY  SURGERY      Bilateral Salpino Oophorectomy and bilateral Salpingectomy    PARATHYROIDECTOMY N/A 07/16/2021    Procedure: PARATHYROIDECTOMY;  Surgeon: Apolinar Suh MD;  Location: [facility];  Service: General;  Laterality: N/A;    SHOULDER SURGERY Right     Rotator cuff    SINUS SURGERY      TOE SURGERY      TUBAL ABDOMINAL LIGATION Bilateral        Family History:   family history includes Cancer in her mother; Diabetes in her father and mother; Hypertension in her brother, father, mother, and sister; Thyroid disease in her sister.   Otherwise pertinent FHx was reviewed and not pertinent to current issue.    Social History:    reports that she has never smoked. She has been exposed to tobacco smoke. She has never used smokeless tobacco. She reports that she does not currently use alcohol. She reports that she does not use drugs.    Medications:     Current Outpatient Medications:     acetaminophen (TYLENOL) 650 MG 8 hr tablet, Take 2 tablets by mouth Every 8 (Eight) Hours As Needed for Mild Pain., Disp: , Rfl:     Alpha-D-Galactosidase (Beano) tablet, Take 2 doses by mouth 3 (Three) Times a Day As Needed (FLATULENCE)., Disp: , Rfl:     amLODIPine (NORVASC) 5 MG tablet, TAKE 1 TABLET BY MOUTH DAILY, Disp: 90 tablet, Rfl: 1    bisoprolol (ZEBeta) 10 MG tablet, Take 1 tablet by mouth Daily., Disp: 90 tablet, Rfl: 1    Cholecalciferol (Vitamin D3) 1.25 MG (14151 UT) capsule, Take 1 capsule by mouth Every 7 (Seven) Days., Disp: 12 capsule, Rfl: 1    clobetasol (TEMOVATE) 0.05 % ointment, Apply 1 Application topically to the appropriate area as directed 3 (Three) Times a Day As Needed. As needed, Disp: , Rfl:     estradiol (ESTRACE) 0.1 MG/GM vaginal cream, Insert 1 g into the vagina 3 (Three) Times a Week if Needed (VAGINAL ATROPHY)., Disp: , Rfl:     fenofibrate micronized (LOFIBRA) 134 MG capsule, TAKE 1 CAPSULE BY MOUTH DAILY, Disp: 90 capsule, Rfl: 1    fluticasone (FLONASE) 50 MCG/ACT nasal spray, 2 sprays  into the nostril(s) as directed by provider Daily As Needed for Rhinitis or Allergies., Disp: , Rfl:     glucose blood (OneTouch Verio) test strip, USE TO TEST BLOOD SUGARS DAILY. DX CODE E11.65, Disp: 100 each, Rfl: 3    losartan (COZAAR) 100 MG tablet, Take 1 tablet by mouth Daily., Disp: 90 tablet, Rfl: 1    meloxicam (MOBIC) 15 MG tablet, TAKE 1 TABLET BY MOUTH DAILY, Disp: 30 tablet, Rfl: 0    metFORMIN (GLUCOPHAGE) 1000 MG tablet, TAKE 1/2 TABLET TWICE A DAY, Disp: 90 tablet, Rfl: 3    methocarbamol (ROBAXIN) 750 MG tablet, Take 1 tablet by mouth 3 (Three) Times a Day., Disp: 90 tablet, Rfl: 1    olopatadine (PATADAY) 0.2 % solution ophthalmic solution, Administer 1 drop to both eyes Daily., Disp: , Rfl:     oxybutynin XL (DITROPAN-XL) 10 MG 24 hr tablet, TAKE 1 TABLET BY MOUTH DAILY, Disp: 30 tablet, Rfl: 2    pancrelipase, Lip-Prot-Amyl, (CREON) 47527-44030 units capsule delayed-release particles capsule, Take 3 capsules by mouth 3 (Three) Times a Day With Meals., Disp: , Rfl:     promethazine (PHENERGAN) 25 MG tablet, Take 1 tablet by mouth Every 8 (Eight) Hours As Needed for Nausea or Vomiting., Disp: 30 tablet, Rfl: 0    sertraline (ZOLOFT) 100 MG tablet, Take 1 tablet by mouth Daily., Disp: 90 tablet, Rfl: 1    simvastatin (ZOCOR) 40 MG tablet, TAKE 1 TABLET BY MOUTH DAILY (Patient taking differently: Take 1 tablet by mouth Every Night.), Disp: 90 tablet, Rfl: 1    sulfamethoxazole-trimethoprim (Bactrim DS) 800-160 MG per tablet, Take 1 tablet by mouth 2 (Two) Times a Day for 10 days., Disp: 20 tablet, Rfl: 0    Tirzepatide (Mounjaro) 7.5 MG/0.5ML solution pen-injector pen, INJECT 7.5 MG UNDER THE SKIN ONCE WEEKLY, Disp: 2 mL, Rfl: 0    traMADol (Ultram) 50 MG tablet, Take 1 tablet by mouth Every 8 (Eight) Hours As Needed for Severe Pain., Disp: 10 tablet, Rfl: 0    traMADol (Ultram) 50 MG tablet, Take 1 tablet by mouth Every 8 (Eight) Hours As Needed for Severe Pain., Disp: 12 tablet, Rfl: 0    vitamin  "B-12 (CYANOCOBALAMIN) 1000 MCG tablet, Take 1 tablet by mouth Daily., Disp: , Rfl:     Allergies:   Allergies   Allergen Reactions    Diflucan [Fluconazole] Headache    Azithromycin GI Intolerance    Codeine Itching    Hydrocodone-Acetaminophen Hives    Latex Itching    Penicillins Hives    Quinapril Other (See Comments)     cough         Objective     Physical Exam:   Vital Signs:   Vitals:    06/12/24 0857   Weight: 79.4 kg (175 lb)   Height: 157.5 cm (62\")   PainSc: 0-No pain     Body mass index is 32.01 kg/m².     Physical Exam    Labs:   Brief Urine Lab Results  (Last result in the past 365 days)        Color   Clarity   Blood   Leuk Est   Nitrite   Protein   CREAT   Urine HCG        03/22/24 1502 Yellow   Clear   Negative   Negative   Negative   Negative                        Lab Results   Component Value Date    GLUCOSE 270 (H) 04/25/2024    CALCIUM 9.7 04/25/2024     04/25/2024    K 4.3 04/25/2024    CO2 24.0 04/25/2024    CL 99 04/25/2024    BUN 21 04/25/2024    CREATININE 0.84 04/25/2024    EGFRIFNONA 95 02/25/2022    BCR 25.0 04/25/2024    ANIONGAP 14.0 04/25/2024       Lab Results   Component Value Date    WBC 8.97 04/25/2024    HGB 12.5 04/25/2024    HCT 40.2 04/25/2024    MCV 85.9 04/25/2024     04/25/2024       No results found for: \"PSA\"    Images:   No Images in the past 120 days found..    Measures:   Tobacco:   Izabel Abreu  reports that she has never smoked. She has been exposed to tobacco smoke. She has never used smokeless tobacco. I have educated her on the risk of diseases from using tobacco products such as {Tobacco Cessation Diseases:89354::\"cancer\",\"COPD\",\"heart disease\"}.     I advised her to quit and she is {Willing/Not Willing to Quit Tobacco Products:91240}.    I spent {Time Spent Tobacco :38094} minutes counseling the patient.           Urine Incontinence: Patient reports that she is not currently experiencing any symptoms of urinary incontinence.    *** "     Assessment / Plan      Assessment/Plan:   68 y.o. female who presented today for follow up of ***    There are no diagnoses linked to this encounter.     Follow Up:   No follow-ups on file.    I spent approximately *** minutes providing clinical care for this patient; including review of patient's chart and provider documentation, face to face time spent with patient in examination room (obtaining history, performing physical exam, discussing diagnosis and management options), placing orders, and completing patient documentation.     Christine Ryder MD  [facility] [place]

## 2024-06-21 DIAGNOSIS — E11.65 UNCONTROLLED TYPE 2 DIABETES MELLITUS WITH HYPERGLYCEMIA: Primary | ICD-10-CM

## 2024-06-25 NOTE — TELEPHONE ENCOUNTER
Spoke with patient.  She states that she has been using the Mounjaro 7.5. She states she is hungry all of the time and not losing weight.  Asking if she can go up to the 10mg dose.  States [facility] in [place] has it in stock.

## 2024-06-25 NOTE — TELEPHONE ENCOUNTER
Patient called following up on refill request. She took her last dose of mounjaro on Sunday. She is needing a refill called into [facility] pharmacy in [place].

## 2024-06-25 NOTE — TELEPHONE ENCOUNTER
PATIENT HAS QUESTIONS ABOUT HER PRESCRIPTION OF MOUNJARO AND WOULD LIKE A CALL BACK    [phone]

## 2024-06-25 NOTE — TELEPHONE ENCOUNTER
Rx Refill Note  Requested Prescriptions     Pending Prescriptions Disp Refills    Tirzepatide (MOUNJARO) 10 MG/0.5ML solution pen-injector pen 2 mL 1     Sig: Inject 0.5 mL under the skin into the appropriate area as directed 1 (One) Time Per Week.      Last office visit with prescribing clinician: 3/29/2024     Next office visit with prescribing clinician: 6/25/2024                           Joleen Gaffney MA  06/25/24, 15:03 EDT

## 2024-06-26 NOTE — TELEPHONE ENCOUNTER
Rx for 10 mg sent. Please make sure patient understands that the purpose of mounjaro is to control diabetes. It is not a weight loss medication. Weight loss, is , in some patients with diabetes , a side effect. We don't increase the dose just to promote weight loss.

## 2024-06-27 ENCOUNTER — TELEPHONE (OUTPATIENT)
Dept: ENDOCRINOLOGY | Facility: CLINIC | Age: 69
End: 2024-06-27
Payer: COMMERCIAL

## 2024-06-27 NOTE — TELEPHONE ENCOUNTER
Please call pt back  [phone]  She has not been able to fine the 7.5 mounjaro anywhere  They have the 10 in [place]  we need to change her to something else     Please call her back

## 2024-06-28 NOTE — TELEPHONE ENCOUNTER
Spoke to pt-her pharmacy transferred the refill to [facility]-they have 7.5mg so she is good for a month

## 2024-07-05 ENCOUNTER — TELEPHONE (OUTPATIENT)
Dept: UROLOGY | Facility: CLINIC | Age: 69
End: 2024-07-05
Payer: COMMERCIAL

## 2024-07-05 NOTE — TELEPHONE ENCOUNTER
Called patient to clarify her current condition. Reports she started having a headache this weekend. Reports that she has been having diarrhea since Monday night. Reports loose, watery brown stools. Reports that she has been taking her promethazine for nausea. She is worried about dehydration and drank Pedialyte last night, which seemed to help.  Advised patient to call her PCP's office as we have not prescribed any new medications or made any medication changes recently.

## 2024-07-05 NOTE — TELEPHONE ENCOUNTER
Hub staff attempted to follow warm transfer process and was unsuccessful     Caller: Izabel Abreu    Relationship to patient: Self    Best call back number: [phone]    Patient is needing: RECEIVED A CALL FROM PT. SHE CALLED ASKING A CLINICAL QUESTION REGARDING A MEDICATION. SHE STATED SHE HAS NOT BEEN FEELING WELL. C/ O DIARRHEA AND NAUSEA. OFFICE PLEASE ADVISE.THANK YOU.

## 2024-07-15 ENCOUNTER — TELEPHONE (OUTPATIENT)
Dept: ENDOCRINOLOGY | Facility: CLINIC | Age: 69
End: 2024-07-15
Payer: COMMERCIAL

## 2024-07-15 DIAGNOSIS — E11.65 UNCONTROLLED TYPE 2 DIABETES MELLITUS WITH HYPERGLYCEMIA: Primary | ICD-10-CM

## 2024-07-15 NOTE — TELEPHONE ENCOUNTER
Caller: Izabel Abreu    Relationship: Self    Best call back number: [phone]     What orders are you requesting (i.e. lab or imaging): LABS    In what timeframe would the patient need to come in: ASAP    Where will you receive your lab/imaging services: [facility] IN [place]    Additional notes: PATIENT NEEDS AN A1C LAB BEFORE KNEE SURGERY.

## 2024-07-15 NOTE — TELEPHONE ENCOUNTER
Patient called needing sooner appt. She is going to call and see if she can get scheduled here the same day when she can get into her other dr appt. She is going to call back and let me know.

## 2024-07-15 NOTE — TELEPHONE ENCOUNTER
Looks like Dr. Sánchez recommended an appointment with PCP or PA in his message. If no availability, I can order an A1c to be faxed to the lab in [place].

## 2024-07-17 ENCOUNTER — TELEPHONE (OUTPATIENT)
Dept: UROLOGY | Facility: CLINIC | Age: 69
End: 2024-07-17

## 2024-07-17 NOTE — TELEPHONE ENCOUNTER
Provider: DR LEYVA    Caller: Izabel Abreu     Relationship to Patient: SELF    Phone Number: [phone]    Reason for Call: APPT TODAY WAS      CANCELLED. PT IS UNABLE TO LEAVE WORK TODAY FOR APPT.    PT STATES IT WAS A WOUND CHECK AND THAT SPOT HAS HEALED NICELY. IF DR LEYVA WANTS, SHE CAN SEND A PICTURE OF IT VIA [employer].

## 2024-07-19 ENCOUNTER — OFFICE VISIT (OUTPATIENT)
Dept: ENDOCRINOLOGY | Facility: CLINIC | Age: 69
End: 2024-07-19
Payer: COMMERCIAL

## 2024-07-19 VITALS
BODY MASS INDEX: 31.47 KG/M2 | OXYGEN SATURATION: 99 % | SYSTOLIC BLOOD PRESSURE: 120 MMHG | HEART RATE: 69 BPM | HEIGHT: 62 IN | WEIGHT: 171 LBS | DIASTOLIC BLOOD PRESSURE: 65 MMHG

## 2024-07-19 DIAGNOSIS — E11.65 UNCONTROLLED TYPE 2 DIABETES MELLITUS WITH HYPERGLYCEMIA: Primary | ICD-10-CM

## 2024-07-19 LAB
EXPIRATION DATE: ABNORMAL
EXPIRATION DATE: ABNORMAL
GLUCOSE BLDC GLUCOMTR-MCNC: 150 MG/DL (ref 70–130)
HBA1C MFR BLD: 7.4 % (ref 4.5–5.7)
Lab: ABNORMAL
Lab: ABNORMAL

## 2024-07-19 PROCEDURE — 82947 ASSAY GLUCOSE BLOOD QUANT: CPT | Performed by: PHYSICIAN ASSISTANT

## 2024-07-19 PROCEDURE — 99214 OFFICE O/P EST MOD 30 MIN: CPT | Performed by: PHYSICIAN ASSISTANT

## 2024-07-19 PROCEDURE — 83036 HEMOGLOBIN GLYCOSYLATED A1C: CPT | Performed by: PHYSICIAN ASSISTANT

## 2024-07-21 NOTE — PROGRESS NOTES
"     Office Note      Date: 2024  Patient Name: Izabel Abreu  MRN: [id number]  : 1955    Chief Complaint   Patient presents with    Diabetes       History of Present Illness:   Izabel Abreu is a 68 y.o. female who presents for Diabetes type 2.   Current RX Metformin 500 mg, Mounjaro 7.5 mg    Bg checks are done: daily  Hypoglycemia :none    Patient was seen during IT outage with limited access to EMR.    Patient is scheduled for left knee replacement with Dr. Patel on  and needs her A1c to be below 7.5. Will need to stop GLP-1 2 weeks prior to surgery.    Notes that she has been doing well with Mounjaro, wonders about going up on the dose.       Last A1c:  Hemoglobin A1C   Date Value Ref Range Status   2024 7.4 (A) 4.5 - 5.7 % Final       Changes in health since last visit: none.     DM Health Maintenance:  Ophtho: 2024  Monofilament / Foot exam: 24  Lipids/Statin: taking a statin with last FLP showing LDL 23 65  JUANITA: 2/10/23  TSH: 23 0.733  Aspirin: not taking  ACE/ARB: losartan     Subjective      Diabetic Complications:  Eyes: No  Kidneys: No  Feet: No  Heart: No        Review of Systems:   Review of Systems   Constitutional:  Negative for activity change, appetite change and fatigue.   Respiratory:  Negative for chest tightness and shortness of breath.    Gastrointestinal:  Negative for abdominal pain.   Musculoskeletal:  Positive for arthralgias and gait problem. Negative for myalgias.   Neurological:  Negative for numbness.   Psychiatric/Behavioral:  The patient is not nervous/anxious.        The following portions of the patient's history were reviewed and updated as appropriate: allergies, current medications, past family history, past medical history, past social history, past surgical history, and problem list.    Objective     Visit Vitals  /65   Pulse 69   Ht 157.5 cm (62\")   Wt 77.6 kg (171 lb)   SpO2 99%   BMI 31.28 kg/m²           Physical " Exam:  Physical Exam  Vitals and nursing note reviewed.   Constitutional:       Appearance: She is well-developed.   HENT:      Head: Normocephalic and atraumatic.   Eyes:      Conjunctiva/sclera: Conjunctivae normal.   Cardiovascular:      Rate and Rhythm: Normal rate and regular rhythm.      Pulses:           Dorsalis pedis pulses are 2+ on the right side and 2+ on the left side.        Posterior tibial pulses are 2+ on the right side and 2+ on the left side.   Pulmonary:      Effort: Pulmonary effort is normal.      Breath sounds: Normal breath sounds.   Musculoskeletal:         General: Normal range of motion.      Cervical back: Normal range of motion.   Feet:      Right foot:      Protective Sensation: 10 sites tested.  10 sites sensed.      Skin integrity: Skin integrity normal.      Toenail Condition: Right toenails are normal.      Left foot:      Protective Sensation: 10 sites tested.  10 sites sensed.      Skin integrity: Skin integrity normal.      Toenail Condition: Left toenails are normal.      Comments: Diabetic Foot Exam Performed and Monofilament Test Performed      Skin:     General: Skin is warm and dry.   Psychiatric:         Behavior: Behavior normal.          Assessment / Plan      Assessment & Plan:  Diagnoses and all orders for this visit:    1. Uncontrolled type 2 diabetes mellitus with hyperglycemia (Primary)  Assessment & Plan:  Diabetes is worsening.   Continue current treatment regimen.  Reminded to bring in blood sugar diary at next visit.  Recommended an ADA diet.  Regular aerobic exercise.  Discussed foot care.    A1c has worsened from 7.0 to 7.4, however, still okay to proceed with planned knee replacement. Discussed increasing Mounjaro dose as future plan. Patient will stay on current dose of Mounjaro 7.5 mg, with plan to hold for 2 weeks prior to surgery. She will restart 7.5 mg after surgery to make sure it is tolerated and then let us know when she is ready to increase to next  dose, 10 mg.     Unable to do labs today so we will plan on urine microalbumin check at next visit.    Diabetes will be reassessed in 6 months     Orders:  -     POC Glucose, Blood  -     POC Glycosylated Hemoglobin (Hb A1C)        Return in about 6 months (around 1/19/2025) for Follow up with Dr. Sánchez..    Portions of this note were completed with voice recognition program.  Electronically signed by Geeta Langston PA-C  [facility]  07/19/2024

## 2024-07-21 NOTE — ASSESSMENT & PLAN NOTE
Diabetes is worsening.   Continue current treatment regimen.  Reminded to bring in blood sugar diary at next visit.  Recommended an ADA diet.  Regular aerobic exercise.  Discussed foot care.    A1c has worsened from 7.0 to 7.4, however, still okay to proceed with planned knee replacement. Discussed increasing Mounjaro dose as future plan. Patient will stay on current dose of Mounjaro 7.5 mg, with plan to hold for 2 weeks prior to surgery. She will restart 7.5 mg after surgery to make sure it is tolerated and then let us know when she is ready to increase to next dose, 10 mg.     Unable to do labs today so we will plan on urine microalbumin check at next visit.    Diabetes will be reassessed in 6 months

## 2024-08-02 ENCOUNTER — TELEPHONE (OUTPATIENT)
Dept: UROLOGY | Facility: CLINIC | Age: 69
End: 2024-08-02
Payer: COMMERCIAL

## 2024-08-02 NOTE — TELEPHONE ENCOUNTER
Provider: DR. LEYVA    Caller: Izabel Herrera    Relationship to Patient: Self     Phone Number: [phone]    Reason for Call: MEDICAL QUESTION    When was the patient last seen: 06/12/24    Notes: MS. HERREAR IS SCHEDULED TO SEE DR. LEYVA ON 08/20/24. PATIENT WOULD LIKE TO KNOW IF THE APPOINTMENT IS NEEDED. IF SO, SHE WILL HAVE TO RESCHEDULE AND WILL BE IN [place] ON 08/07/24.    PATIENT ALSO STATES THAT HER SCAR IS A LITTLE ON THE DARK SIDE AND IT TOOK A WHILE BEFORE IT HEALED.     PLEASE CALL PATIENT TO DISCUSS.

## 2024-08-05 RX ORDER — BLOOD SUGAR DIAGNOSTIC
STRIP MISCELLANEOUS
Qty: 100 EACH | Refills: 1 | Status: SHIPPED | OUTPATIENT
Start: 2024-08-05

## 2024-08-05 NOTE — TELEPHONE ENCOUNTER
Rx Refill Note  Requested Prescriptions     Pending Prescriptions Disp Refills    glucose blood (OneTouch Verio) test strip [Pharmacy Med Name: ONE TOUCH VERIO TEST ST(NEW)100S]       Sig: TEST DAILY      Last office visit with prescribing clinician: 7/19/2024    Next office visit with prescribing clinician: 9/4/2024

## 2024-08-07 ENCOUNTER — OFFICE VISIT (OUTPATIENT)
Dept: UROLOGY | Facility: CLINIC | Age: 69
End: 2024-08-07
Payer: COMMERCIAL

## 2024-08-07 VITALS — WEIGHT: 171 LBS | BODY MASS INDEX: 31.47 KG/M2 | HEIGHT: 62 IN

## 2024-08-07 DIAGNOSIS — N32.81 OAB (OVERACTIVE BLADDER): Primary | ICD-10-CM

## 2024-08-07 NOTE — PROGRESS NOTES
Follow Up Office Visit      Patient Name: Izabel Abreu  : 1955   MRN: [id number]     Chief Complaint:    Chief Complaint   Patient presents with    OAB (overactive bladder)       Referring Provider: No ref. provider found    History of Present Illness: Izabel Abreu is a 68 y.o. female who presents today for follow up after Axonics.  She states she recently had her device off on accident.  She is using maybe 1 pad per day when she is at work.  At home she is not having very little leakage.    She reports her fecal incontinence has improved dramatically.  However, she occasionally has some streaking.   She has not been on estrace cream because she was told to stop prior to her knee replacement.      Subjective      Review of System:   Review of Systems   Constitutional:  Negative for chills, fatigue, fever and unexpected weight change.   HENT:  Negative for congestion, rhinorrhea and sore throat.    Eyes:  Negative for visual disturbance.   Respiratory:  Negative for apnea, cough, chest tightness and shortness of breath.    Cardiovascular:  Negative for chest pain.   Gastrointestinal:  Negative for abdominal pain, constipation, diarrhea, nausea and vomiting.   Endocrine: Negative for polydipsia and polyuria.   Genitourinary:  Negative for difficulty urinating, dysuria, enuresis, flank pain, frequency, genital sores, hematuria and urgency.   Musculoskeletal:  Negative for gait problem.   Skin:  Negative for rash and wound.   Allergic/Immunologic: Negative for immunocompromised state.   Neurological:  Negative for dizziness, tremors, syncope, weakness and light-headedness.   Hematological:  Does not bruise/bleed easily.      I have reviewed the ROS documented by my clinical staff, I have updated appropriately and I agree. Christine Ryder MD    I have reviewed and the following portions of the patient's history were updated as appropriate: past family history, past medical history, past social  history, past surgical history and problem list.    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    Type 2 diabetes mellitus     Urinary incontinence     Urinary tract infection Oct 2023    Took antibiotic    Vaginal infection     Vitamin D deficiency        Past Surgical History:  Past Surgical History:   Procedure Laterality Date     SECTION      COLON SURGERY      Colonoscopy    COLONOSCOPY      SCHEDULED FOR     D & C AND LAPAROSCOPY      INTERSTIM PLACEMENT N/A 2024    Procedure: AXONICS STAGES 1 AND 2 LEAD AND GENERATOR PLACEMENT;  Surgeon: Christine Ryder MD;  Location: [facility];  Service: Urology;  Laterality: N/A;    OOPHORECTOMY      Removal of both ovaries & both fallopian tubes    OVARY SURGERY      Bilateral Salpino Oophorectomy and bilateral Salpingectomy    PARATHYROIDECTOMY N/A 2021    Procedure: PARATHYROIDECTOMY;  Surgeon: Apolinar Suh MD;  Location:  [facility];  Service: General;  Laterality: N/A;    SHOULDER SURGERY Right     Rotator cuff    SINUS SURGERY      TOE SURGERY      TUBAL ABDOMINAL LIGATION Bilateral        Family History:   family history includes Cancer in her mother; Diabetes in her father and mother; Hypertension in her brother, father, mother, and sister; Thyroid disease in her sister.   Otherwise pertinent FHx was reviewed and not pertinent to current issue.    Social History:    reports that she has never  smoked. She has been exposed to tobacco smoke. She has never used smokeless tobacco. She reports that she does not currently use alcohol. She reports that she does not use drugs.    Medications:     Current Outpatient Medications:     acetaminophen (TYLENOL) 650 MG 8 hr tablet, Take 2 tablets by mouth Every 8 (Eight) Hours As Needed for Mild Pain., Disp: , Rfl:     Alpha-D-Galactosidase (Beano) tablet, Take 2 doses by mouth 3 (Three) Times a Day As Needed (FLATULENCE)., Disp: , Rfl:     amLODIPine (NORVASC) 5 MG tablet, TAKE 1 TABLET BY MOUTH DAILY, Disp: 90 tablet, Rfl: 1    bisoprolol (ZEBeta) 10 MG tablet, Take 1 tablet by mouth Daily., Disp: 90 tablet, Rfl: 1    Cholecalciferol (Vitamin D3) 1.25 MG (35507 UT) capsule, Take 1 capsule by mouth Every 7 (Seven) Days., Disp: 12 capsule, Rfl: 1    clobetasol (TEMOVATE) 0.05 % ointment, Apply 1 Application topically to the appropriate area as directed 3 (Three) Times a Day As Needed. As needed, Disp: , Rfl:     estradiol (ESTRACE) 0.1 MG/GM vaginal cream, Insert 1 g into the vagina 3 (Three) Times a Week if Needed (VAGINAL ATROPHY)., Disp: , Rfl:     fenofibrate micronized (LOFIBRA) 134 MG capsule, TAKE 1 CAPSULE BY MOUTH DAILY, Disp: 90 capsule, Rfl: 1    fluticasone (FLONASE) 50 MCG/ACT nasal spray, 2 sprays into the nostril(s) as directed by provider Daily As Needed for Rhinitis or Allergies., Disp: , Rfl:     glucose blood (OneTouch Verio) test strip, TEST DAILY, Disp: 100 each, Rfl: 1    losartan (COZAAR) 100 MG tablet, Take 1 tablet by mouth Daily., Disp: 90 tablet, Rfl: 1    metFORMIN (GLUCOPHAGE) 1000 MG tablet, TAKE 1/2 TABLET TWICE A DAY, Disp: 90 tablet, Rfl: 3    methocarbamol (ROBAXIN) 750 MG tablet, Take 1 tablet by mouth 3 (Three) Times a Day., Disp: 90 tablet, Rfl: 1    olopatadine (PATADAY) 0.2 % solution ophthalmic solution, Administer 1 drop to both eyes Daily., Disp: , Rfl:     oxybutynin XL (DITROPAN-XL) 10 MG 24 hr tablet, TAKE 1 TABLET BY MOUTH  DAILY, Disp: 30 tablet, Rfl: 2    pancrelipase, Lip-Prot-Amyl, (CREON) 48328-86245 units capsule delayed-release particles capsule, Take 3 capsules by mouth 3 (Three) Times a Day With Meals., Disp: , Rfl:     promethazine (PHENERGAN) 25 MG tablet, Take 1 tablet by mouth Every 8 (Eight) Hours As Needed for Nausea or Vomiting., Disp: 30 tablet, Rfl: 0    sertraline (ZOLOFT) 100 MG tablet, Take 1 tablet by mouth Daily., Disp: 90 tablet, Rfl: 1    simvastatin (ZOCOR) 40 MG tablet, TAKE 1 TABLET BY MOUTH DAILY (Patient taking differently: Take 1 tablet by mouth Every Night.), Disp: 90 tablet, Rfl: 1    Tirzepatide (Mounjaro) 7.5 MG/0.5ML solution pen-injector pen, Inject 0.5 mL under the skin into the appropriate area as directed 1 (One) Time Per Week., Disp: 2 mL, Rfl: 1    traMADol (Ultram) 50 MG tablet, Take 1 tablet by mouth Every 8 (Eight) Hours As Needed for Severe Pain., Disp: 10 tablet, Rfl: 0    vitamin B-12 (CYANOCOBALAMIN) 1000 MCG tablet, Take 1 tablet by mouth Daily., Disp: , Rfl:     meloxicam (MOBIC) 15 MG tablet, TAKE 1 TABLET BY MOUTH DAILY, Disp: 30 tablet, Rfl: 0    traMADol (Ultram) 50 MG tablet, Take 1 tablet by mouth Every 8 (Eight) Hours As Needed for Severe Pain., Disp: 12 tablet, Rfl: 0    Allergies:   Allergies   Allergen Reactions    Diflucan [Fluconazole] Headache    Azithromycin GI Intolerance    Codeine Itching    Hydrocodone-Acetaminophen Hives    Latex Itching    Penicillins Hives    Quinapril Other (See Comments)     cough       Bladder & Bowel Symptom Questionnaire    How often do you usually urinate during the day ?   2 - About every 2-3 hours    2.   How many timed do you urinate at night?   1 - 2 times at night   3.   What is the reason that you usually urinate?   2 - Moderate urge (can delay 10-60 min)   4.   Once you get the urge to go, how long can you     comfortably delay?   3 - Less than 10 min   5.   How often do you get a sudden urge that makes you rush to the bathroom?   2 -  "A few times a month   6.   How often does a sudden urge to urinate result in you leaking urine or wetting pads?   2 - A few times a month   7.  In your opinion, how good is your bladder control?   2 - Good   8.  Do you have accidental bowel leakage?   yes   9.  Do you have difficulty fully emptying your bladder?   no   10.  Do you experience accidental leakage when performing some physical activity such as coughing, sneezing, laughing or exercise?   yes   11. Have you tried medications to help improve your symptoms?   yes   12. Would you be interested in learning about a long-lasting option that may help you with your symptoms?   yes                                                                             Total Score   14     0-7 (Mild) 8-16 (Moderate) 17-28 (Severe)       Objective     Physical Exam:   Vital Signs:   Vitals:    08/07/24 1448   Weight: 77.6 kg (171 lb)   Height: 157.5 cm (62\")     Body mass index is 31.28 kg/m².     Physical Exam  Vitals and nursing note reviewed. Exam conducted with a chaperone present.   Constitutional:       General: She is awake. She is not in acute distress.     Appearance: Normal appearance.   HENT:      Head: Normocephalic and atraumatic.      Right Ear: External ear normal.      Left Ear: External ear normal.      Nose: Nose normal.   Eyes:      Conjunctiva/sclera: Conjunctivae normal.   Pulmonary:      Effort: Pulmonary effort is normal. No respiratory distress.   Abdominal:      General: Abdomen is flat. There is no distension.      Palpations: Abdomen is soft. There is no mass.      Tenderness: There is no abdominal tenderness. There is no right CVA tenderness, left CVA tenderness, guarding or rebound.      Hernia: No hernia is present.   Genitourinary:     Exam position: Lithotomy position.   Skin:     General: Skin is warm.   Neurological:      General: No focal deficit present.      Mental Status: She is alert and oriented to person, place, and time.      Gait: Gait " "normal.   Psychiatric:         Behavior: Behavior normal. Behavior is cooperative.         Thought Content: Thought content normal.         Judgment: Judgment normal.         Labs:   Brief Urine Lab Results  (Last result in the past 365 days)        Color   Clarity   Blood   Leuk Est   Nitrite   Protein   CREAT   Urine HCG        03/22/24 1502 Yellow   Clear   Negative   Negative   Negative   Negative                        Lab Results   Component Value Date    GLUCOSE 270 (H) 04/25/2024    CALCIUM 9.7 04/25/2024     04/25/2024    K 4.3 04/25/2024    CO2 24.0 04/25/2024    CL 99 04/25/2024    BUN 21 04/25/2024    CREATININE 0.84 04/25/2024    EGFRIFNONA 95 02/25/2022    BCR 25.0 04/25/2024    ANIONGAP 14.0 04/25/2024       Lab Results   Component Value Date    WBC 8.97 04/25/2024    HGB 12.5 04/25/2024    HCT 40.2 04/25/2024    MCV 85.9 04/25/2024     04/25/2024       No results found for: \"PSA\"    Images:   CT Lower Extremity Right Without Contrast    Result Date: 7/5/2024  Degenerative joint disease. Images reviewed, interpreted, and dictated by BRYAN Mckay MD      Measures:   Tobacco:   Izabel Abreu  reports that she has never smoked. She has been exposed to tobacco smoke. She has never used smokeless tobacco.       Urine Incontinence: Patient reports that she is rarely currently experiencing any symptoms of urinary incontinence.         Assessment / Plan      Assessment/Plan:   68 y.o. female who presented today for follow up after Axonics placement.  She has improved dramatically and reports it is 95% better.  We discussed expectations.  She is going knee replacement at end of August.      Diagnoses and all orders for this visit:    1. OAB (overactive bladder) (Primary)         Follow Up:   Return in about 6 months (around 2/7/2025) for Recheck.    I spent approximately 30 minutes providing clinical care for this patient; including review of patient's chart and provider " documentation, face to face time spent with patient in examination room (obtaining history, performing physical exam, discussing diagnosis and management options), placing orders, and completing patient documentation.     Christine Ryder MD  [facility] [place]

## 2024-08-19 RX ORDER — BISOPROLOL FUMARATE 10 MG/1
10 TABLET, FILM COATED ORAL DAILY
Qty: 90 TABLET | Refills: 0 | Status: SHIPPED | OUTPATIENT
Start: 2024-08-19

## 2024-08-19 NOTE — TELEPHONE ENCOUNTER
Rx Refill Note  Requested Prescriptions     Pending Prescriptions Disp Refills    bisoprolol (ZEBeta) 10 MG tablet [Pharmacy Med Name: BISOPROLOL FUMARATE 10MG TABLETS] 90 tablet 1     Sig: TAKE 1 TABLET BY MOUTH DAILY          Last office visit with prescribing clinician: 3/29/2024     Next office visit with prescribing clinician: 8/17/2024         Harleen Mckinney MA  08/19/24, 08:45 EDT

## 2024-08-19 NOTE — TELEPHONE ENCOUNTER
Rx Refill Note  Requested Prescriptions     Pending Prescriptions Disp Refills    bisoprolol (ZEBeta) 10 MG tablet 90 tablet 1     Sig: Take 1 tablet by mouth Daily.          Last office visit with prescribing clinician: 3/29/2024     Next office visit with prescribing clinician: 9/4/2024         Harleen Mckinney MA  08/19/24, 08:39 EDT

## 2024-09-03 ENCOUNTER — TELEPHONE (OUTPATIENT)
Dept: FAMILY MEDICINE CLINIC | Facility: CLINIC | Age: 69
End: 2024-09-03
Payer: COMMERCIAL

## 2024-09-03 ENCOUNTER — TELEPHONE (OUTPATIENT)
Dept: ENDOCRINOLOGY | Facility: CLINIC | Age: 69
End: 2024-09-03

## 2024-09-03 NOTE — TELEPHONE ENCOUNTER
Caller: Izabel Abreu    Relationship to patient: Self    Best call back number: [phone]     Chief complaint: EARLIER APPT    Type of visit: OFFICE VISIT    Requested date: ANYTIME FROM 9/16/24 FORWARD IN THE MORNING SO SHE CAN DO LABS,    If rescheduling, when is the original appointment: 10/28/24     Additional notes:PATIENT WOULD LIKE TO BE FIT IN SOONER. SHE HAS HAD KNEE SURGERY AND CAN'T DO PRIOR TO 9/16/24

## 2024-09-04 NOTE — TELEPHONE ENCOUNTER
Moved patient up sooner for . Called patient and got voicemail left voicemail with new appt. Date and time. Explained if that didn't work to call us back ..

## 2024-09-05 ENCOUNTER — TELEPHONE (OUTPATIENT)
Dept: UROLOGY | Facility: CLINIC | Age: 69
End: 2024-09-05

## 2024-09-05 NOTE — TELEPHONE ENCOUNTER
Provider: DR LEYVA    Caller: VAISHALI HERRERA    Relationship to Patient: SELF    Reason for Call: PT CALLED AND REQUESTED PHONE NUMBER FOR NOEL WITH [employer].  HER SITMULATOR HAS  AND SHE NEEDS ASSISTANCE.    PLEASE CALL PT BACK AT PH# [phone], [phone]

## 2024-09-11 ENCOUNTER — OFFICE VISIT (OUTPATIENT)
Dept: FAMILY MEDICINE CLINIC | Facility: CLINIC | Age: 69
End: 2024-09-11
Payer: COMMERCIAL

## 2024-09-11 ENCOUNTER — TELEPHONE (OUTPATIENT)
Dept: FAMILY MEDICINE CLINIC | Facility: CLINIC | Age: 69
End: 2024-09-11

## 2024-09-11 VITALS
WEIGHT: 171.4 LBS | OXYGEN SATURATION: 96 % | BODY MASS INDEX: 31.54 KG/M2 | SYSTOLIC BLOOD PRESSURE: 120 MMHG | HEIGHT: 62 IN | HEART RATE: 72 BPM | DIASTOLIC BLOOD PRESSURE: 80 MMHG

## 2024-09-11 DIAGNOSIS — E78.2 HYPERLIPIDEMIA, MIXED: ICD-10-CM

## 2024-09-11 DIAGNOSIS — Z13.29 SCREENING FOR THYROID DISORDER: ICD-10-CM

## 2024-09-11 DIAGNOSIS — E11.65 UNCONTROLLED TYPE 2 DIABETES MELLITUS WITH HYPERGLYCEMIA: Primary | ICD-10-CM

## 2024-09-11 DIAGNOSIS — E66.09 CLASS 1 OBESITY DUE TO EXCESS CALORIES WITH SERIOUS COMORBIDITY AND BODY MASS INDEX (BMI) OF 31.0 TO 31.9 IN ADULT: ICD-10-CM

## 2024-09-11 DIAGNOSIS — F41.1 GAD (GENERALIZED ANXIETY DISORDER): ICD-10-CM

## 2024-09-11 DIAGNOSIS — I10 ESSENTIAL HYPERTENSION: ICD-10-CM

## 2024-09-11 LAB
EXPIRATION DATE: NORMAL
Lab: NORMAL
POC CREATININE URINE: 200
POC MICROALBUMIN URINE: 80

## 2024-09-11 PROCEDURE — 82044 UR ALBUMIN SEMIQUANTITATIVE: CPT | Performed by: FAMILY MEDICINE

## 2024-09-11 PROCEDURE — 99214 OFFICE O/P EST MOD 30 MIN: CPT | Performed by: FAMILY MEDICINE

## 2024-09-11 PROCEDURE — 36415 COLL VENOUS BLD VENIPUNCTURE: CPT | Performed by: FAMILY MEDICINE

## 2024-09-11 RX ORDER — LOSARTAN POTASSIUM 100 MG/1
100 TABLET ORAL DAILY
Qty: 90 TABLET | Refills: 1 | Status: SHIPPED | OUTPATIENT
Start: 2024-09-11

## 2024-09-11 RX ORDER — BISOPROLOL FUMARATE 10 MG/1
10 TABLET, FILM COATED ORAL DAILY
Qty: 90 TABLET | Refills: 1 | Status: SHIPPED | OUTPATIENT
Start: 2024-09-11

## 2024-09-11 RX ORDER — FEXOFENADINE HCL 180 MG/1
TABLET ORAL
COMMUNITY
Start: 2024-08-06

## 2024-09-11 RX ORDER — DOCUSATE SODIUM 100 MG/1
CAPSULE, LIQUID FILLED ORAL
COMMUNITY
Start: 2024-08-22 | End: 2024-09-21

## 2024-09-11 RX ORDER — ASPIRIN 81 MG/1
81 TABLET ORAL
COMMUNITY
Start: 2024-08-22 | End: 2024-10-06

## 2024-09-11 RX ORDER — SIMVASTATIN 40 MG
40 TABLET ORAL NIGHTLY
Qty: 90 TABLET | Refills: 1 | Status: SHIPPED | OUTPATIENT
Start: 2024-09-11

## 2024-09-11 RX ORDER — FENOFIBRATE 134 MG/1
134 CAPSULE ORAL DAILY
Qty: 90 CAPSULE | Refills: 1 | Status: SHIPPED | OUTPATIENT
Start: 2024-09-11

## 2024-09-11 RX ORDER — CHOLECALCIFEROL (VITAMIN D3) 1250 MCG
50000 CAPSULE ORAL
Qty: 12 CAPSULE | Refills: 1 | Status: SHIPPED | OUTPATIENT
Start: 2024-09-11

## 2024-09-11 RX ORDER — SERTRALINE HYDROCHLORIDE 100 MG/1
100 TABLET, FILM COATED ORAL DAILY
Qty: 90 TABLET | Refills: 1 | Status: SHIPPED | OUTPATIENT
Start: 2024-09-11

## 2024-09-11 RX ORDER — AMLODIPINE BESYLATE 5 MG/1
5 TABLET ORAL DAILY
Qty: 90 TABLET | Refills: 1 | Status: SHIPPED | OUTPATIENT
Start: 2024-09-11

## 2024-09-11 RX ORDER — MELOXICAM 15 MG/1
TABLET ORAL DAILY
COMMUNITY
Start: 2024-08-22 | End: 2024-09-21

## 2024-09-11 NOTE — TELEPHONE ENCOUNTER
PATIENT REFUSED TO MAKE F/U UP HAD ANOTHER APPT. WILL CALL BACK TO SCHEDULE. ADVISED HER DR CARDONA FILLS UP FAST

## 2024-09-12 LAB
BASOPHILS # BLD AUTO: 0.1 X10E3/UL (ref 0–0.2)
BASOPHILS NFR BLD AUTO: 1 %
BUN SERPL-MCNC: 26 MG/DL (ref 8–27)
BUN/CREAT SERPL: 34 (ref 12–28)
CALCIUM SERPL-MCNC: 10.8 MG/DL (ref 8.7–10.3)
CHLORIDE SERPL-SCNC: 102 MMOL/L (ref 96–106)
CHOLEST SERPL-MCNC: 148 MG/DL (ref 100–199)
CO2 SERPL-SCNC: 20 MMOL/L (ref 20–29)
CREAT SERPL-MCNC: 0.77 MG/DL (ref 0.57–1)
EGFRCR SERPLBLD CKD-EPI 2021: 84 ML/MIN/1.73
EOSINOPHIL # BLD AUTO: 0.2 X10E3/UL (ref 0–0.4)
EOSINOPHIL NFR BLD AUTO: 3 %
ERYTHROCYTE [DISTWIDTH] IN BLOOD BY AUTOMATED COUNT: 14.6 % (ref 11.7–15.4)
GLUCOSE SERPL-MCNC: 169 MG/DL (ref 70–99)
HBA1C MFR BLD: 7.9 % (ref 4.8–5.6)
HCT VFR BLD AUTO: 40.5 % (ref 34–46.6)
HDLC SERPL-MCNC: 51 MG/DL
HGB BLD-MCNC: 12.5 G/DL (ref 11.1–15.9)
IMM GRANULOCYTES # BLD AUTO: 0 X10E3/UL (ref 0–0.1)
IMM GRANULOCYTES NFR BLD AUTO: 1 %
LDLC SERPL CALC-MCNC: 72 MG/DL (ref 0–99)
LYMPHOCYTES # BLD AUTO: 1.6 X10E3/UL (ref 0.7–3.1)
LYMPHOCYTES NFR BLD AUTO: 18 %
MCH RBC QN AUTO: 26.6 PG (ref 26.6–33)
MCHC RBC AUTO-ENTMCNC: 30.9 G/DL (ref 31.5–35.7)
MCV RBC AUTO: 86 FL (ref 79–97)
MONOCYTES # BLD AUTO: 0.6 X10E3/UL (ref 0.1–0.9)
MONOCYTES NFR BLD AUTO: 8 %
NEUTROPHILS # BLD AUTO: 6 X10E3/UL (ref 1.4–7)
NEUTROPHILS NFR BLD AUTO: 69 %
PLATELET # BLD AUTO: 610 X10E3/UL (ref 150–450)
POTASSIUM SERPL-SCNC: 5 MMOL/L (ref 3.5–5.2)
RBC # BLD AUTO: 4.7 X10E6/UL (ref 3.77–5.28)
SODIUM SERPL-SCNC: 139 MMOL/L (ref 134–144)
TRIGL SERPL-MCNC: 147 MG/DL (ref 0–149)
TSH SERPL DL<=0.005 MIU/L-ACNC: 0.32 UIU/ML (ref 0.45–4.5)
VLDLC SERPL CALC-MCNC: 25 MG/DL (ref 5–40)
WBC # BLD AUTO: 8.6 X10E3/UL (ref 3.4–10.8)

## 2024-09-20 ENCOUNTER — TELEPHONE (OUTPATIENT)
Dept: UROLOGY | Facility: CLINIC | Age: 69
End: 2024-09-20
Payer: COMMERCIAL

## 2024-10-07 ENCOUNTER — TELEPHONE (OUTPATIENT)
Dept: FAMILY MEDICINE CLINIC | Facility: CLINIC | Age: 69
End: 2024-10-07
Payer: COMMERCIAL

## 2024-10-07 NOTE — TELEPHONE ENCOUNTER
Caller: Izabel Abreu    Relationship: Self    Best call back number: [phone]    What is the best time to reach you: TUESDAY, THURSDAY AND FRIDAY ANYTIME    Who are you requesting to speak with (clinical staff, provider,  specific staff member): DO BRENDAN    What was the call regarding: PATIENT NEEDS TO KNOW WHAT OTHER VACCINATIONS BESIDES FLU VACCINATIONS DOES SHE NEED?    3 MOUNJARO INJECTIONS LEFT. THEN WANTS TO SPEAK WITH PCP BEFORE STARTING 10 MG.    PROTEIN SHAKES COULD THAT HAVE CAUSED HER CALCIUM LEVEL TO INCREASE/LAST LAB RESULTS.  SHE STATED SHE ALSO TAKES VITAMIN D.    SHOULD PATIENT SEE THE ENDOCRINOLOGIST THAT COMES TO THE OFFICE (UPSTAIRS) TO KEEP DIABETES STABLE?  YESTERDAY HERS .    Is it okay if the provider responds through MyChart: EITHER WAY.

## 2024-10-09 ENCOUNTER — TELEPHONE (OUTPATIENT)
Dept: FAMILY MEDICINE CLINIC | Facility: CLINIC | Age: 69
End: 2024-10-09

## 2024-10-09 NOTE — TELEPHONE ENCOUNTER
Caller: Izabel Abreu     Relationship: [unfilled]     Best call back number: [phone]    What is your medical concern? PT CALLED BACK TO ASK RHONDAE OTHER THAN FLU SHOT IS THERE ANY THING ELSE SHE WILL NEED TO GET LIKE TO COVID, RSV SHOT

## 2024-10-09 NOTE — TELEPHONE ENCOUNTER
Patient states her sugar has been running alittle up yesterday it was 160 and today 175 137 this afternoon

## 2024-11-10 DIAGNOSIS — E11.65 UNCONTROLLED TYPE 2 DIABETES MELLITUS WITH HYPERGLYCEMIA: ICD-10-CM

## 2024-11-10 DIAGNOSIS — I10 ESSENTIAL HYPERTENSION: ICD-10-CM

## 2024-11-11 RX ORDER — BISOPROLOL FUMARATE 10 MG/1
10 TABLET, FILM COATED ORAL DAILY
Qty: 90 TABLET | Refills: 1 | Status: SHIPPED | OUTPATIENT
Start: 2024-11-11

## 2024-11-11 RX ORDER — AMLODIPINE BESYLATE 5 MG/1
5 TABLET ORAL DAILY
Qty: 90 TABLET | Refills: 1 | Status: SHIPPED | OUTPATIENT
Start: 2024-11-11

## 2024-11-11 RX ORDER — LOSARTAN POTASSIUM 100 MG/1
100 TABLET ORAL DAILY
Qty: 90 TABLET | Refills: 1 | Status: SHIPPED | OUTPATIENT
Start: 2024-11-11

## 2024-12-12 ENCOUNTER — TELEPHONE (OUTPATIENT)
Dept: FAMILY MEDICINE CLINIC | Facility: CLINIC | Age: 69
End: 2024-12-12
Payer: COMMERCIAL

## 2024-12-12 DIAGNOSIS — E11.65 UNCONTROLLED TYPE 2 DIABETES MELLITUS WITH HYPERGLYCEMIA: Primary | ICD-10-CM

## 2024-12-12 NOTE — TELEPHONE ENCOUNTER
Caller: Izabel Abreu    Relationship: Self    Best call back number: [phone]     Which medication are you concerned about: Tirzepatide (Mounjaro) 7.5 MG/0.5ML solution pen-injector pen     Who prescribed you this medication: DR CARDONA    When did you start taking this medication: ABOUT A YEAR.    What are your concerns: SYSTEM WON'T LET ME PICK THE MEDICATION TO ORDER REFILL FOR PT. PT NEEDS REFILLS.  [facility] [id number] - [place], [place] - [address] AT [facility] [address] - [phone]  - [phone]  [phone]

## 2024-12-16 ENCOUNTER — OFFICE VISIT (OUTPATIENT)
Dept: ENDOCRINOLOGY | Facility: CLINIC | Age: 69
End: 2024-12-16
Payer: COMMERCIAL

## 2024-12-16 VITALS
WEIGHT: 170 LBS | BODY MASS INDEX: 31.28 KG/M2 | OXYGEN SATURATION: 97 % | DIASTOLIC BLOOD PRESSURE: 66 MMHG | HEIGHT: 62 IN | SYSTOLIC BLOOD PRESSURE: 132 MMHG | HEART RATE: 75 BPM

## 2024-12-16 DIAGNOSIS — E11.65 UNCONTROLLED TYPE 2 DIABETES MELLITUS WITH HYPERGLYCEMIA: Primary | ICD-10-CM

## 2024-12-16 DIAGNOSIS — I10 ESSENTIAL HYPERTENSION: ICD-10-CM

## 2024-12-16 LAB
ANION GAP SERPL CALCULATED.3IONS-SCNC: 9.9 MMOL/L (ref 5–15)
BUN SERPL-MCNC: 22 MG/DL (ref 8–23)
BUN/CREAT SERPL: 25.6 (ref 7–25)
CALCIUM SPEC-SCNC: 9.9 MG/DL (ref 8.6–10.5)
CHLORIDE SERPL-SCNC: 105 MMOL/L (ref 98–107)
CO2 SERPL-SCNC: 25.1 MMOL/L (ref 22–29)
CREAT SERPL-MCNC: 0.86 MG/DL (ref 0.57–1)
EGFRCR SERPLBLD CKD-EPI 2021: 73.7 ML/MIN/1.73
EXPIRATION DATE: ABNORMAL
EXPIRATION DATE: ABNORMAL
GLUCOSE BLDC GLUCOMTR-MCNC: 178 MG/DL (ref 70–130)
GLUCOSE SERPL-MCNC: 183 MG/DL (ref 65–99)
HBA1C MFR BLD: 7.7 % (ref 4.5–5.7)
Lab: ABNORMAL
Lab: ABNORMAL
POTASSIUM SERPL-SCNC: 4 MMOL/L (ref 3.5–5.2)
SODIUM SERPL-SCNC: 140 MMOL/L (ref 136–145)
T4 FREE SERPL-MCNC: 1.24 NG/DL (ref 0.92–1.68)
TSH SERPL DL<=0.05 MIU/L-ACNC: 0.53 UIU/ML (ref 0.27–4.2)

## 2024-12-16 PROCEDURE — 84443 ASSAY THYROID STIM HORMONE: CPT | Performed by: PHYSICIAN ASSISTANT

## 2024-12-16 PROCEDURE — 80048 BASIC METABOLIC PNL TOTAL CA: CPT | Performed by: PHYSICIAN ASSISTANT

## 2024-12-16 PROCEDURE — 84439 ASSAY OF FREE THYROXINE: CPT | Performed by: PHYSICIAN ASSISTANT

## 2024-12-16 PROCEDURE — 82306 VITAMIN D 25 HYDROXY: CPT | Performed by: PHYSICIAN ASSISTANT

## 2024-12-16 NOTE — PROGRESS NOTES
"     Office Note      Date: 2024  Patient Name: Izabel Abreu  MRN: [id number]  : 1955    Chief Complaint   Patient presents with    Diabetes     Type II    Hypertension       History of Present Illness:   Izabel Abreu is a 69 y.o. female who presents for follow-up for type 2 diabetes.  She typically sees Dr. Dez Sánchez.  She had a left total knee replacement in August and all went well.  She remains on metformin 500 mg twice a day and Mounjaro 7.5 mg weekly.  She reports she tolerates the Mounjaro with no major issues.  She reports overall her blood sugars are pretty good in the mornings.  Typically between 120 and 150s milligrams per deciliter.  She had labs completed in September with her primary care physician and her calcium was mildly elevated.  She was drinking protein drinks with the extra calcium and has stopped these.  She had a parathyroidectomy in .  She remains on vitamin D.  She reports she also takes Tums occasionally for upset stomach.      Subjective     Review of Systems:   Review of Systems   Constitutional: Negative.    Cardiovascular: Negative.    Gastrointestinal: Negative.    Endocrine: Negative.    Neurological: Negative.        The following portions of the patient's history were reviewed and updated as appropriate: allergies, current medications, past family history, past medical history, past social history, past surgical history, and problem list.    Objective     Vitals:    24 1402   BP: 132/66   BP Location: Right arm   Patient Position: Sitting   Cuff Size: Adult   Pulse: 75   SpO2: 97%   Weight: 77.1 kg (170 lb)   Height: 157.5 cm (62\")     Body mass index is 31.09 kg/m².    Physical Exam  Vitals reviewed.   Constitutional:       General: She is not in acute distress.     Appearance: Normal appearance.   Neck:      Thyroid: No thyroid mass, thyromegaly or thyroid tenderness.   Lymphadenopathy:      Cervical: No cervical adenopathy.   Neurological:     "  Mental Status: She is alert.         HEMOGLOBIN A1C  Lab Results   Component Value Date    HGBA1C 7.7 (A) 12/16/2024       GLUCOSE  Glucose   Date Value Ref Range Status   12/16/2024 178 (A) 70 - 130 mg/dL Final       CMP  Lab Results   Component Value Date    GLUCOSE 169 (H) 09/11/2024    BUN 26 09/11/2024    CREATININE 0.77 09/11/2024    EGFRIFNONA 95 02/25/2022    BCR 34 (H) 09/11/2024    K 5.0 09/11/2024    CO2 20 09/11/2024    CALCIUM 10.8 (H) 09/11/2024    PROTENTOTREF 6.9 11/29/2023    LABIL2 1.8 11/29/2023    AST 22 11/29/2023    ALT 19 11/29/2023       LIPID PANEL  Lab Results   Component Value Date    CHOL 138 02/10/2023    CHLPL 148 09/11/2024    TRIG 147 09/11/2024    HDL 51 09/11/2024    LDL 72 09/11/2024         TSH  Lab Results   Component Value Date    TSH 0.322 (L) 09/11/2024       Assessment / Plan      Assessment & Plan:  1. Uncontrolled type 2 diabetes mellitus with hyperglycemia  Her hemoglobin A1c is up as compared to July at 7.7%.  We will try increasing her Mounjaro to the 10 mg weekly dose.  I sent a new prescription for this to her pharmacy.  She will continue metformin 500 mg twice a day.  Her weight is down 1 pound since her appointment in July.  I encouraged healthy eating habits and physical activity as tolerated.  Her calcium was elevated on lab work from September.  We will recheck a BMP and vitamin D level today.  Her TSH was also slightly low on recent lab work.  We will check TFTs today.  Will send note with results and plan.  - POC Glucose, Blood  - POC Glycosylated Hemoglobin (Hb A1C)  - T4, Free; Future  - TSH; Future  - Basic Metabolic Panel; Future  - Vitamin D,25-Hydroxy; Future  - T4, Free  - TSH  - Basic Metabolic Panel  - Vitamin D,25-Hydroxy    2. Essential hypertension  Her blood pressure is okay today.  She will continue her current medications.      Return in about 4 months (around 4/16/2025) for Recheck, usually sees Dr. Dez Sánchez or has seen Geeta.     This note  was dictated using Dragon voice recognition.    Electronically signed by: FAINA Arevalo  12/16/2024

## 2024-12-17 LAB — 25(OH)D3 SERPL-MCNC: 80.9 NG/ML (ref 30–100)

## 2025-01-17 ENCOUNTER — TELEPHONE (OUTPATIENT)
Dept: UROLOGY | Facility: CLINIC | Age: 70
End: 2025-01-17
Payer: COMMERCIAL

## 2025-01-17 NOTE — TELEPHONE ENCOUNTER
Spoke to pt and advised her to leave a urine sample. PT states she's snowed in and didn't feel comfortable driving. I advised her to take oxybutynin and OTC azo for her symptoms. Pt verbalized understanding.

## 2025-01-17 NOTE — TELEPHONE ENCOUNTER
PT called back and wanted to let Lesvia know she believes she has a bladder or kidney infection. She also said it hurts when she pees and has blood in her urine and when wiping. She wants to know if something can be called in for her.

## 2025-01-17 NOTE — TELEPHONE ENCOUNTER
Pt called clinic today she is having a lot of urgency incontinence it seems to be getting worse. She was wondering about changing some settings with her stimulator. I texted Phillip the [employer] rep and her texted back to let me know he would reach out to her today. Pt was also wondering if she could start taking her oxybutynin to help until the setting are adjusted. She has a whole bottle at home.

## 2025-01-18 ENCOUNTER — OFFICE VISIT (OUTPATIENT)
Dept: FAMILY MEDICINE CLINIC | Facility: CLINIC | Age: 70
End: 2025-01-18
Payer: COMMERCIAL

## 2025-01-18 VITALS
DIASTOLIC BLOOD PRESSURE: 76 MMHG | OXYGEN SATURATION: 98 % | BODY MASS INDEX: 30.91 KG/M2 | HEART RATE: 70 BPM | SYSTOLIC BLOOD PRESSURE: 114 MMHG | WEIGHT: 168 LBS | HEIGHT: 62 IN

## 2025-01-18 DIAGNOSIS — N30.01 ACUTE CYSTITIS WITH HEMATURIA: Primary | ICD-10-CM

## 2025-01-18 LAB
BILIRUB BLD-MCNC: ABNORMAL MG/DL
CLARITY, POC: ABNORMAL
COLOR UR: ABNORMAL
EXPIRATION DATE: ABNORMAL
GLUCOSE UR STRIP-MCNC: NEGATIVE MG/DL
KETONES UR QL: NEGATIVE
LEUKOCYTE EST, POC: ABNORMAL
Lab: ABNORMAL
NITRITE UR-MCNC: POSITIVE MG/ML
PH UR: 7.5 [PH] (ref 5–8)
PROT UR STRIP-MCNC: ABNORMAL MG/DL
RBC # UR STRIP: ABNORMAL /UL
SP GR UR: 1.02 (ref 1–1.03)
UROBILINOGEN UR QL: NORMAL

## 2025-01-18 PROCEDURE — 99213 OFFICE O/P EST LOW 20 MIN: CPT | Performed by: PHYSICIAN ASSISTANT

## 2025-01-18 PROCEDURE — 81003 URINALYSIS AUTO W/O SCOPE: CPT | Performed by: PHYSICIAN ASSISTANT

## 2025-01-18 RX ORDER — CIPROFLOXACIN 500 MG/1
500 TABLET, FILM COATED ORAL 2 TIMES DAILY
Qty: 14 TABLET | Refills: 0 | Status: SHIPPED | OUTPATIENT
Start: 2025-01-18

## 2025-01-18 RX ORDER — PHENAZOPYRIDINE HYDROCHLORIDE 100 MG/1
100 TABLET, FILM COATED ORAL 3 TIMES DAILY PRN
Qty: 12 TABLET | Refills: 0 | Status: SHIPPED | OUTPATIENT
Start: 2025-01-18

## 2025-01-18 NOTE — ASSESSMENT & PLAN NOTE
I am going to put her on Cipro and Pyridium and she can follow-up either with Dr. Mcwilliams or with her urologist.  She was cautioned that should she develop high fever, back pain or nausea or vomiting she should go to the emergency room but otherwise if symptoms do not clear she needs to be rechecked.  She does have appointments to follow-up with urology.

## 2025-01-18 NOTE — PROGRESS NOTES
"Chief Complaint  Urinary Tract Infection (Hematuria past 3 days, burning, painful, urgency. She does have a bladder simulator)    Subjective          Izabel Abreu presents to [facility]    History of Present Illness patient is having classic urinary tract symptoms of burning when she urinates, frequency and urgency and she has seen some blood in her urine.  She has got history of urinary tract infections and in fact has overactive bladder and has a stimulator implant and is followed by urology.  She was told by her urologist that she could take AZO but she has not done that because she did not want to mask the color or blood of her urine.    Objective   Vital Signs:   /76 (BP Location: Right arm, Patient Position: Sitting, Cuff Size: Adult)   Pulse 70   Ht 157.5 cm (62\")   Wt 76.2 kg (168 lb)   SpO2 98%   BMI 30.73 kg/m²     Body mass index is 30.73 kg/m².    Review of Systems   Constitutional:  Negative for chills, fatigue and fever.   Respiratory:  Negative for cough and shortness of breath.    Cardiovascular:  Negative for chest pain and palpitations.   Genitourinary:  Positive for dysuria, frequency, pelvic pressure and urgency.   Musculoskeletal:  Negative for back pain and myalgias.   Neurological:  Negative for dizziness and headache.   Psychiatric/Behavioral:  Negative for depressed mood. The patient is not nervous/anxious.        Past History:  Medical History: has a past medical history of Allergic, Anxiety, Arthritis, Asthma, Colon polyp (2023), Diabetes mellitus, Diverticulosis (2023), Elevated cholesterol, Exocrine pancreatic insufficiency, Flatulence, Heart murmur, HL (hearing loss) (11-1-23), Hypercalcemia, Hyperglycemia, Hyperparathyroidism, Hypertension, Irritable bowel syndrome, Sleep apnea, Subclinical hyperthyroidism, Thyroid nodule, Type 2 diabetes mellitus, Urinary incontinence (2018), Urinary tract infection (Oct 2023), Vaginal infection (2022), " and Vitamin D deficiency.   Surgical History: has a past surgical history that includes Tubal ligation (Bilateral);  section; Shoulder surgery (Right); Colonoscopy; Sinus surgery; Toe Surgery; d & c and laparoscopy; Parathyroidectomy (N/A, 2021); Ovary surgery; Interstim Stages 1 and 2 Lead and Generator Placement (N/A, 2024); Oophorectomy (); and Colon surgery ().   Family History: family history includes Cancer in her mother; Diabetes in her father and mother; Hypertension in her brother, father, mother, and sister; Thyroid disease in her sister.   Social History: reports that she has never smoked. She has been exposed to tobacco smoke. She has never used smokeless tobacco. She reports that she does not currently use alcohol. She reports that she does not use drugs.      Current Outpatient Medications:     acetaminophen (TYLENOL) 650 MG 8 hr tablet, Take 2 tablets by mouth Every 8 (Eight) Hours As Needed for Mild Pain., Disp: , Rfl:     Alpha-D-Galactosidase (Beano) tablet, Take 2 doses by mouth 3 (Three) Times a Day As Needed (FLATULENCE)., Disp: , Rfl:     amLODIPine (NORVASC) 5 MG tablet, Take 1 tablet by mouth Daily., Disp: 90 tablet, Rfl: 1    bisoprolol (ZEBeta) 10 MG tablet, Take 1 tablet by mouth Daily., Disp: 90 tablet, Rfl: 1    Cholecalciferol (Vitamin D3) 1.25 MG (28142 UT) capsule, Take 1 capsule by mouth Every 7 (Seven) Days., Disp: 12 capsule, Rfl: 1    clobetasol (TEMOVATE) 0.05 % ointment, Apply 1 Application topically to the appropriate area as directed 3 (Three) Times a Day As Needed. As needed, Disp: , Rfl:     estradiol (ESTRACE) 0.1 MG/GM vaginal cream, Insert 1 g into the vagina 3 (Three) Times a Week if Needed (VAGINAL ATROPHY)., Disp: , Rfl:     fenofibrate micronized (LOFIBRA) 134 MG capsule, Take 1 capsule by mouth Daily., Disp: 90 capsule, Rfl: 1    fexofenadine (Allegra Allergy) 180 MG tablet, Allegra Allergy 180 MG Oral Tablet QTY: 0 tablet Days: 0 Refills:  0  Written: 08/06/24 Patient Instructions:, Disp: , Rfl:     fluticasone (FLONASE) 50 MCG/ACT nasal spray, Administer 2 sprays into the nostril(s) as directed by provider Daily As Needed for Rhinitis or Allergies., Disp: , Rfl:     glucose blood (OneTouch Verio) test strip, TEST DAILY, Disp: 100 each, Rfl: 1    losartan (COZAAR) 100 MG tablet, Take 1 tablet by mouth Daily., Disp: 90 tablet, Rfl: 1    metFORMIN (GLUCOPHAGE) 1000 MG tablet, TAKE 1/2 TABLET TWICE A DAY, Disp: 90 tablet, Rfl: 3    methocarbamol (ROBAXIN) 750 MG tablet, Take 1 tablet by mouth 3 (Three) Times a Day. (Patient taking differently: Take 1 tablet by mouth 3 (Three) Times a Day. PRN ONLY), Disp: 90 tablet, Rfl: 1    oxybutynin XL (DITROPAN-XL) 10 MG 24 hr tablet, TAKE 1 TABLET BY MOUTH DAILY, Disp: 30 tablet, Rfl: 2    pancrelipase, Lip-Prot-Amyl, (CREON) 22704-49437 units capsule delayed-release particles capsule, Take 3 capsules by mouth 3 (Three) Times a Day With Meals., Disp: , Rfl:     promethazine (PHENERGAN) 25 MG tablet, Take 1 tablet by mouth Every 8 (Eight) Hours As Needed for Nausea or Vomiting., Disp: 30 tablet, Rfl: 0    sertraline (ZOLOFT) 100 MG tablet, Take 1 tablet by mouth Daily., Disp: 90 tablet, Rfl: 1    simvastatin (ZOCOR) 40 MG tablet, Take 1 tablet by mouth Every Night., Disp: 90 tablet, Rfl: 1    Tirzepatide 10 MG/0.5ML solution auto-injector, Inject 10 mg under the skin into the appropriate area as directed 1 (One) Time Per Week. NEW DOSE TO REPLACE 7.5 mg dose, Disp: 2 mL, Rfl: 5    traMADol (Ultram) 50 MG tablet, Take 1 tablet by mouth Every 8 (Eight) Hours As Needed for Severe Pain., Disp: 10 tablet, Rfl: 0    vitamin B-12 (CYANOCOBALAMIN) 1000 MCG tablet, Take 1 tablet by mouth Daily., Disp: , Rfl:     ciprofloxacin (Cipro) 500 MG tablet, Take 1 tablet by mouth 2 (Two) Times a Day., Disp: 14 tablet, Rfl: 0    olopatadine (PATADAY) 0.2 % solution ophthalmic solution, Administer 1 drop to both eyes Daily. (Patient  not taking: Reported on 12/16/2024), Disp: , Rfl:     phenazopyridine (Pyridium) 100 MG tablet, Take 1 tablet by mouth 3 (Three) Times a Day As Needed for Bladder Spasms., Disp: 12 tablet, Rfl: 0    Allergies: Diflucan [fluconazole], Azithromycin, Codeine, Hydrocodone-acetaminophen, Latex, Penicillins, and Quinapril    Physical Exam  Constitutional:       Appearance: Normal appearance.   Abdominal:      Palpations: Abdomen is soft.      Tenderness: There is no abdominal tenderness. There is no right CVA tenderness, left CVA tenderness, guarding or rebound.   Neurological:      Mental Status: She is alert.   Psychiatric:         Mood and Affect: Mood normal.         Behavior: Behavior normal.          Result Review :                   Assessment and Plan    Diagnoses and all orders for this visit:    1. Acute cystitis with hematuria (Primary)  Assessment & Plan:  I am going to put her on Cipro and Pyridium and she can follow-up either with Dr. Mcwilliams or with her urologist.  She was cautioned that should she develop high fever, back pain or nausea or vomiting she should go to the emergency room but otherwise if symptoms do not clear she needs to be rechecked.  She does have appointments to follow-up with urology.    Orders:  -     POCT urinalysis dipstick, automated    Other orders  -     ciprofloxacin (Cipro) 500 MG tablet; Take 1 tablet by mouth 2 (Two) Times a Day.  Dispense: 14 tablet; Refill: 0  -     phenazopyridine (Pyridium) 100 MG tablet; Take 1 tablet by mouth 3 (Three) Times a Day As Needed for Bladder Spasms.  Dispense: 12 tablet; Refill: 0        Follow Up   Return if symptoms worsen or fail to improve.  Patient was given instructions and counseling regarding her condition or for health maintenance advice. Please see specific information pulled into the AVS if appropriate.     Felicia Martínez PA-C

## 2025-01-22 ENCOUNTER — TELEPHONE (OUTPATIENT)
Dept: FAMILY MEDICINE CLINIC | Facility: CLINIC | Age: 70
End: 2025-01-22
Payer: COMMERCIAL

## 2025-01-22 RX ORDER — NITROFURANTOIN 25; 75 MG/1; MG/1
100 CAPSULE ORAL 2 TIMES DAILY
Qty: 14 CAPSULE | Refills: 0 | Status: SHIPPED | OUTPATIENT
Start: 2025-01-22 | End: 2025-01-29

## 2025-01-22 NOTE — TELEPHONE ENCOUNTER
Patient called and stated that the Cipro makes her very weak, gives her HAs and generally don't feel good. Also, she stated that when it starts to leave her system she begins to feel better.  She was wondering if she could stop taking the ABX?

## 2025-01-24 ENCOUNTER — TELEPHONE (OUTPATIENT)
Dept: ENDOCRINOLOGY | Facility: CLINIC | Age: 70
End: 2025-01-24
Payer: COMMERCIAL

## 2025-01-24 NOTE — TELEPHONE ENCOUNTER
Her glucoses are likely elevated due to urinary tract infection, treatment of this is the priority.  She previously tolerated Mounjaro 10 mg weekly for 4 weeks and is only recently having some abdominal discomfort and symptoms.  This could be due to her urinary tract infection.  Recommend increasing water intake and minimizing simple carbohydrates/sugars in her diet for the next couple of days.  As her infection clears her blood sugar should trend down and if they do not she should call us back.  If she wants to try to take an extra half tablet of metformin daily she may do so although in the past she had gas from this.

## 2025-01-24 NOTE — TELEPHONE ENCOUNTER
PT CALLED PLEASE CALL HER BACK  [phone]  SHE WAS CALLING HER BLOOD SUGARS HAVE BEEN RUNNING HIGH  1/22 AM  191 NOON 221 10 PM  204  1/23  193  1/24  193   SHE IS TAKING MOUNJARO 10-SHE IS HAVING NAUSEA,  CRAMPY STOMACH AND NOT FEELING WELL  SHE HAS ALSO HAD A UTI AND HAS BEEN ON CIPRO AND PYRIDIUM  SHE HAS STOPPED THIS DUE TO SIDE EFFECTS AND IS GOING TODAY TO  [facility]CR[facility]     PLEASE CALL HER BACK

## 2025-01-27 ENCOUNTER — TELEPHONE (OUTPATIENT)
Dept: ENDOCRINOLOGY | Facility: CLINIC | Age: 70
End: 2025-01-27
Payer: COMMERCIAL

## 2025-01-27 NOTE — TELEPHONE ENCOUNTER
It sounds like she is doing better, but maybe just call her to verify she dose not need anything.  Thanks RT

## 2025-01-27 NOTE — TELEPHONE ENCOUNTER
PT CALLED STATING SHE HAD A UTI AND HER BS WAS RUNNING 200+. SHE STATED IT HAS GONE AWAY AND HER BS IS RUNNING 120's. SHE STATED SHE SPOKE W/ DR ROSENSTEIN AND HE WANTED HER TO CALL US BACK AND LET US KNOW HOW SHE WAS DOING.

## 2025-01-31 RX ORDER — BLOOD SUGAR DIAGNOSTIC
STRIP MISCELLANEOUS
Qty: 100 EACH | Refills: 1 | Status: SHIPPED | OUTPATIENT
Start: 2025-01-31

## 2025-01-31 RX ORDER — LANOLIN ALCOHOL/MO/W.PET/CERES
1000 CREAM (GRAM) TOPICAL DAILY
Qty: 90 TABLET | Refills: 3 | Status: SHIPPED | OUTPATIENT
Start: 2025-01-31

## 2025-01-31 RX ORDER — LANCETS 30 GAUGE
1 EACH MISCELLANEOUS DAILY
Qty: 100 EACH | Refills: 3 | Status: SHIPPED | OUTPATIENT
Start: 2025-01-31

## 2025-02-14 ENCOUNTER — TELEPHONE (OUTPATIENT)
Dept: FAMILY MEDICINE CLINIC | Facility: CLINIC | Age: 70
End: 2025-02-14
Payer: COMMERCIAL

## 2025-02-14 NOTE — TELEPHONE ENCOUNTER
Patient called and advised she had knee replacement and has swelling around the knee and is concerned about a blood clot. I advised patient to go to the ER where they can evaluate her. Patient call got disconnected.

## 2025-02-14 NOTE — TELEPHONE ENCOUNTER
Patient called back and stated she has a possible blood clot. Aura directed her the ER but she preferred to come by the Saturday clinic instead. Aura replied that the Saturday clinic is for like flu and cold type sick visits and the line was disconnected.  I spoke with her and put her on hold for clinical to speak with Valreie

## 2025-02-14 NOTE — TELEPHONE ENCOUNTER
Patient called to let us know she has a pocket of fluid on her knee she did have knee replacement surgery gabe means listen to the phone call and she talked to the patient to come be seen on the Saturday clinic 02/14/2025 to see dr jagdish bernal

## 2025-02-15 ENCOUNTER — OFFICE VISIT (OUTPATIENT)
Dept: FAMILY MEDICINE CLINIC | Facility: CLINIC | Age: 70
End: 2025-02-15
Payer: COMMERCIAL

## 2025-02-15 VITALS
BODY MASS INDEX: 30.86 KG/M2 | DIASTOLIC BLOOD PRESSURE: 76 MMHG | HEART RATE: 72 BPM | WEIGHT: 167.7 LBS | HEIGHT: 62 IN | SYSTOLIC BLOOD PRESSURE: 116 MMHG | OXYGEN SATURATION: 96 %

## 2025-02-15 DIAGNOSIS — M79.89 SWELLING OF BOTH LOWER EXTREMITIES: Primary | ICD-10-CM

## 2025-02-15 DIAGNOSIS — Z96.652 STATUS POST LEFT KNEE REPLACEMENT: ICD-10-CM

## 2025-02-15 DIAGNOSIS — I10 ESSENTIAL HYPERTENSION: ICD-10-CM

## 2025-02-15 RX ORDER — AMLODIPINE BESYLATE 5 MG/1
2.5 TABLET ORAL DAILY
Status: SHIPPED
Start: 2025-02-15

## 2025-02-15 RX ORDER — HYDROCHLOROTHIAZIDE 25 MG/1
25 TABLET ORAL
Qty: 30 TABLET | Refills: 1 | Status: SHIPPED | OUTPATIENT
Start: 2025-02-15

## 2025-02-15 NOTE — ASSESSMENT & PLAN NOTE
Reassured no evidence for DVT on exam or knee replacement infection.  She does have an appointment later this month with her orthopedic surgeon (Dr. Patel) for follow-up.

## 2025-02-15 NOTE — ASSESSMENT & PLAN NOTE
We did discuss her lower extremity swelling is likely multifactorial related to valve insufficiency and venous stasis combined with inactivity and high salt diet along with obesity.    We did review her medications and we will try and cut back to half tablet with her amlodipine and add in hydrochlorothiazide in the morning.    She will work on a low-salt diet and I did recommend thigh-high compression stockings and she does have a follow-up planned with her PCP next month.    She was given written instructions regarding the plan and understands need for follow-up to recheck her blood pressure along with electrolytes with addition of hydrochlorothiazide.  No history of gout reported.    Patient voiced understanding and is comfortable with plan.

## 2025-02-15 NOTE — PROGRESS NOTES
"Chief Complaint  Ankle swelling and left leg swelling increasing over the past several weeks    Subjective    History of Present Illness:  Izabel Abreu is a 69 y.o. female who presents today for office visit given concerns with increased swelling along her ankles and left shin.    No redness or pain.    Prior left knee replacement.    She has been less active and primarily works from home sitting at her desk throughout the day with less physical activity over the winter.  She has been on amlodipine for hypertension and her blood pressure has been doing very well but she has noticed increased swelling along her ankles and her left lower leg more than right leg.    No shortness of breath or pleuritic pain.    She is working on cutting back on salt intake but does have a high salt diet from review of foods.    She does have a follow-up with her orthopedic surgeon (Dr. Luther (but is not having any knee pain or swelling or redness.    No numbness or tingling.      Objective   Vital Signs:   /76 (BP Location: Left arm, Patient Position: Sitting, Cuff Size: Adult)   Pulse 72   Ht 157.5 cm (62\")   Wt 76.1 kg (167 lb 11.2 oz)   SpO2 96%   BMI 30.67 kg/m²     Review of Systems   Constitutional:  Negative for appetite change, chills and fever.   HENT:  Negative for hearing loss.    Eyes:  Negative for blurred vision.   Respiratory:  Negative for chest tightness and shortness of breath.    Cardiovascular:  Positive for leg swelling. Negative for chest pain.   Gastrointestinal:  Negative for abdominal pain.   Musculoskeletal:  Negative for gait problem.   Skin:  Negative for rash.   Psychiatric/Behavioral:  Negative for depressed mood.        Past History:  Medical History: has a past medical history of Allergic, Anxiety, Arthritis, Asthma, Colon polyp (2023), Diabetes mellitus, Diverticulosis (2023), Elevated cholesterol, Exocrine pancreatic insufficiency, Flatulence, Heart murmur, HL (hearing loss) (11-1-23), " Hypercalcemia, Hyperglycemia, Hyperparathyroidism, Hypertension, Irritable bowel syndrome, Sleep apnea, Subclinical hyperthyroidism, Thyroid nodule, Type 2 diabetes mellitus, Urinary incontinence (), Urinary tract infection (Oct 2023), Vaginal infection (), and Vitamin D deficiency.   Surgical History: has a past surgical history that includes Tubal ligation (Bilateral);  section; Shoulder surgery (Right); Colonoscopy; Sinus surgery; Toe Surgery; d & c and laparoscopy; Parathyroidectomy (N/A, 2021); Ovary surgery; Interstim Stages 1 and 2 Lead and Generator Placement (N/A, 2024); Oophorectomy (); and Colon surgery ().   Family History: family history includes Cancer in her mother; Diabetes in her father and mother; Hypertension in her brother, father, mother, and sister; Thyroid disease in her sister.   Social History: reports that she has never smoked. She has been exposed to tobacco smoke. She has never used smokeless tobacco. She reports that she does not currently use alcohol. She reports that she does not use drugs.      Current Outpatient Medications:     acetaminophen (TYLENOL) 650 MG 8 hr tablet, Take 2 tablets by mouth Every 8 (Eight) Hours As Needed for Mild Pain., Disp: , Rfl:     Alpha-D-Galactosidase (Beano) tablet, Take 2 doses by mouth 3 (Three) Times a Day As Needed (FLATULENCE)., Disp: , Rfl:     amLODIPine (NORVASC) 5 MG tablet, Take 0.5 tablets by mouth Daily., Disp: , Rfl:     bisoprolol (ZEBeta) 10 MG tablet, Take 1 tablet by mouth Daily., Disp: 90 tablet, Rfl: 1    Cholecalciferol (Vitamin D3) 1.25 MG (80119 UT) capsule, Take 1 capsule by mouth Every 7 (Seven) Days., Disp: 12 capsule, Rfl: 1    clobetasol (TEMOVATE) 0.05 % ointment, Apply 1 Application topically to the appropriate area as directed 3 (Three) Times a Day As Needed. As needed, Disp: , Rfl:     estradiol (ESTRACE) 0.1 MG/GM vaginal cream, Insert 1 g into the vagina 3 (Three) Times a Week if  Needed (VAGINAL ATROPHY)., Disp: , Rfl:     fenofibrate micronized (LOFIBRA) 134 MG capsule, Take 1 capsule by mouth Daily., Disp: 90 capsule, Rfl: 1    fexofenadine (Allegra Allergy) 180 MG tablet, Allegra Allergy 180 MG Oral Tablet QTY: 0 tablet Days: 0 Refills: 0  Written: 08/06/24 Patient Instructions:, Disp: , Rfl:     fluticasone (FLONASE) 50 MCG/ACT nasal spray, Administer 2 sprays into the nostril(s) as directed by provider Daily As Needed for Rhinitis or Allergies., Disp: , Rfl:     glucose blood (OneTouch Verio) test strip, USE TO CHECK BLOOD SUGAR EVERY DAY, Disp: 100 each, Rfl: 1    losartan (COZAAR) 100 MG tablet, Take 1 tablet by mouth Daily., Disp: 90 tablet, Rfl: 1    metFORMIN (GLUCOPHAGE) 1000 MG tablet, TAKE 1/2 TABLET TWICE A DAY, Disp: 90 tablet, Rfl: 3    methocarbamol (ROBAXIN) 750 MG tablet, Take 1 tablet by mouth 3 (Three) Times a Day. (Patient taking differently: Take 1 tablet by mouth 3 (Three) Times a Day. PRN ONLY), Disp: 90 tablet, Rfl: 1    OneTouch Delica Lancets 30G misc, Use 1 each Daily. Dx e11.65, Disp: 100 each, Rfl: 3    oxybutynin XL (DITROPAN-XL) 10 MG 24 hr tablet, TAKE 1 TABLET BY MOUTH DAILY, Disp: 30 tablet, Rfl: 2    pancrelipase, Lip-Prot-Amyl, (CREON) 00049-44326 units capsule delayed-release particles capsule, Take 3 capsules by mouth 3 (Three) Times a Day With Meals., Disp: , Rfl:     phenazopyridine (Pyridium) 100 MG tablet, Take 1 tablet by mouth 3 (Three) Times a Day As Needed for Bladder Spasms., Disp: 12 tablet, Rfl: 0    promethazine (PHENERGAN) 25 MG tablet, Take 1 tablet by mouth Every 8 (Eight) Hours As Needed for Nausea or Vomiting., Disp: 30 tablet, Rfl: 0    sertraline (ZOLOFT) 100 MG tablet, Take 1 tablet by mouth Daily., Disp: 90 tablet, Rfl: 1    simvastatin (ZOCOR) 40 MG tablet, Take 1 tablet by mouth Every Night., Disp: 90 tablet, Rfl: 1    Tirzepatide 10 MG/0.5ML solution auto-injector, Inject 10 mg under the skin into the appropriate area as  directed 1 (One) Time Per Week. NEW DOSE TO REPLACE 7.5 mg dose, Disp: 2 mL, Rfl: 5    traMADol (Ultram) 50 MG tablet, Take 1 tablet by mouth Every 8 (Eight) Hours As Needed for Severe Pain., Disp: 10 tablet, Rfl: 0    vitamin B-12 (CYANOCOBALAMIN) 1000 MCG tablet, Take 1 tablet by mouth Daily., Disp: 90 tablet, Rfl: 3    hydroCHLOROthiazide 25 MG tablet, Take 1 tablet by mouth Every Morning. For blood pressure and fluid, Disp: 30 tablet, Rfl: 1    olopatadine (PATADAY) 0.2 % solution ophthalmic solution, Administer 1 drop to both eyes Daily. (Patient not taking: Reported on 12/16/2024), Disp: , Rfl:     Allergies: Diflucan [fluconazole], Azithromycin, Codeine, Hydrocodone-acetaminophen, Latex, Penicillins, and Quinapril    Physical Exam  Constitutional:       Appearance: She is obese.   HENT:      Head: Normocephalic.      Right Ear: External ear normal.      Left Ear: External ear normal.      Nose: Nose normal.   Eyes:      Pupils: Pupils are equal, round, and reactive to light.   Cardiovascular:      Rate and Rhythm: Normal rate and regular rhythm.      Heart sounds: Normal heart sounds. No murmur heard.     No friction rub. No gallop.   Pulmonary:      Effort: Pulmonary effort is normal.      Breath sounds: Normal breath sounds. No wheezing, rhonchi or rales.   Musculoskeletal:         General: No tenderness. Normal range of motion.      Cervical back: Normal range of motion.      Right lower leg: Edema present.      Left lower leg: Edema present.      Comments: Left knee with incisional scar from prior replacement.  No erythema or calor.    Bilateral 1+ edema to mid ankle with some purulence on the left side but no evidence for DVT.  No calf tenderness.  No Baker's cyst.    She does have a small right ankle ganglion cyst.       Skin:     General: Skin is warm and dry.   Neurological:      General: No focal deficit present.      Mental Status: She is alert.   Psychiatric:         Mood and Affect: Mood normal.          Behavior: Behavior normal.         Thought Content: Thought content normal.          Result Review                   Assessment and Plan  Diagnoses and all orders for this visit:    1. Swelling of both lower extremities (Primary)  Assessment & Plan:  We did discuss her lower extremity swelling is likely multifactorial related to valve insufficiency and venous stasis combined with inactivity and high salt diet along with obesity.    We did review her medications and we will try and cut back to half tablet with her amlodipine and add in hydrochlorothiazide in the morning.    She will work on a low-salt diet and I did recommend thigh-high compression stockings and she does have a follow-up planned with her PCP next month.    She was given written instructions regarding the plan and understands need for follow-up to recheck her blood pressure along with electrolytes with addition of hydrochlorothiazide.  No history of gout reported.    Patient voiced understanding and is comfortable with plan.        Orders:  -     hydroCHLOROthiazide 25 MG tablet; Take 1 tablet by mouth Every Morning. For blood pressure and fluid  Dispense: 30 tablet; Refill: 1    2. Essential hypertension  Assessment & Plan:  Blood pressure is well-controlled but concerns that she is having more lower extremity swelling related to her amlodipine.  We will cut back to 2.5 mg daily with amlodipine and add hydrochlorothiazide in the morning.    Keep appointment with her PCP as scheduled for March 6, 2025 and discussed she will also need some lab work to recheck her electrolytes.        Orders:  -     amLODIPine (NORVASC) 5 MG tablet; Take 0.5 tablets by mouth Daily.  -     hydroCHLOROthiazide 25 MG tablet; Take 1 tablet by mouth Every Morning. For blood pressure and fluid  Dispense: 30 tablet; Refill: 1    3. Status post left knee replacement  Assessment & Plan:  Reassured no evidence for DVT on exam or knee replacement infection.  She does have an  appointment later this month with her orthopedic surgeon (Dr. Patel) for follow-up.                         Follow Up  Return in about 19 days (around 3/6/2025) for Med recheck.    Felipe Evans MD

## 2025-02-15 NOTE — ASSESSMENT & PLAN NOTE
Blood pressure is well-controlled but concerns that she is having more lower extremity swelling related to her amlodipine.  We will cut back to 2.5 mg daily with amlodipine and add hydrochlorothiazide in the morning.    Keep appointment with her PCP as scheduled for March 6, 2025 and discussed she will also need some lab work to recheck her electrolytes.

## 2025-02-24 ENCOUNTER — TELEPHONE (OUTPATIENT)
Dept: UROLOGY | Facility: CLINIC | Age: 70
End: 2025-02-24
Payer: COMMERCIAL

## 2025-02-24 NOTE — TELEPHONE ENCOUNTER
Caller: VAISHALI SHARON    Relationship: SELF    Best call back number: [phone]    What form or medical record are you requesting: PATIENT IS WONDERING IF YOU WOULD BE WILLING TO FILL OUT AN [employer] DISABILITY ACT FORM FOR HER. SHE IS WANTING TO WORK FROM HOME FULL TIME BECAUSE OF HER BLADDER AND BOWEL ISSUES. PLEASE REACH OUT AND LET THE PATIENT KNOW IF THIS IS SOMETHING YOU WOULD BE WILLING TO DO.      Who is requesting this form or medical record from you: PATIENT'S EMPLOYER     BEST NUMBER [phone] YOU MAY LEAVE A MESSAGE IF NO ANSWER.

## 2025-02-26 NOTE — TELEPHONE ENCOUNTER
Attempted to contact x2 - phone call was picked up and then I was hung up on. Patient will need to contact her pcp office for disability paperwork to be completed. Will send patient mychart msg as well.

## 2025-02-26 NOTE — TELEPHONE ENCOUNTER
Pt called back, I let her know that her PCP would need to fill out the disability paperwork. Pt verbalized understanding.

## 2025-03-05 ENCOUNTER — TELEPHONE (OUTPATIENT)
Dept: FAMILY MEDICINE CLINIC | Facility: CLINIC | Age: 70
End: 2025-03-05
Payer: COMMERCIAL

## 2025-03-06 ENCOUNTER — OFFICE VISIT (OUTPATIENT)
Dept: FAMILY MEDICINE CLINIC | Facility: CLINIC | Age: 70
End: 2025-03-06
Payer: COMMERCIAL

## 2025-03-06 VITALS
SYSTOLIC BLOOD PRESSURE: 122 MMHG | HEART RATE: 68 BPM | OXYGEN SATURATION: 99 % | RESPIRATION RATE: 18 BRPM | BODY MASS INDEX: 30.64 KG/M2 | HEIGHT: 62 IN | DIASTOLIC BLOOD PRESSURE: 70 MMHG | WEIGHT: 166.5 LBS

## 2025-03-06 DIAGNOSIS — E11.65 UNCONTROLLED TYPE 2 DIABETES MELLITUS WITH HYPERGLYCEMIA: Primary | ICD-10-CM

## 2025-03-06 DIAGNOSIS — M79.89 SWELLING OF BOTH LOWER EXTREMITIES: ICD-10-CM

## 2025-03-06 DIAGNOSIS — E78.2 HYPERLIPIDEMIA, MIXED: ICD-10-CM

## 2025-03-06 DIAGNOSIS — I10 ESSENTIAL HYPERTENSION: ICD-10-CM

## 2025-03-06 PROBLEM — K86.81 EXOCRINE PANCREATIC INSUFFICIENCY: Status: ACTIVE | Noted: 2025-03-06

## 2025-03-06 LAB
EXPIRATION DATE: NORMAL
Lab: NORMAL
POC ALBUMIN, URINE: NORMAL MG/L
POC CREATININE, URINE: NORMAL MG/DL
POC URINE ALB/CREA RATIO: NORMAL

## 2025-03-06 PROCEDURE — 82044 UR ALBUMIN SEMIQUANTITATIVE: CPT | Performed by: FAMILY MEDICINE

## 2025-03-06 PROCEDURE — 99214 OFFICE O/P EST MOD 30 MIN: CPT | Performed by: FAMILY MEDICINE

## 2025-03-06 PROCEDURE — 82570 ASSAY OF URINE CREATININE: CPT | Performed by: FAMILY MEDICINE

## 2025-03-06 RX ORDER — HYDROCHLOROTHIAZIDE 25 MG/1
12.5 TABLET ORAL
Start: 2025-03-06

## 2025-03-06 RX ORDER — AMLODIPINE BESYLATE 2.5 MG/1
2.5 TABLET ORAL DAILY
Qty: 90 TABLET | Refills: 3 | Status: SHIPPED | OUTPATIENT
Start: 2025-03-06

## 2025-03-06 NOTE — PROGRESS NOTES
New Patient Office Visit      Patient Name: Izabel Abreu  : 1955   MRN: [id number]     Chief Complaint:    Chief Complaint   Patient presents with    Establish Care       History of Present Illness: Izabel Abreu is a 69 y.o. female who is here today to establish.  Patient was recently in office for swelling of the lower extremities.  Swelling was thought to be secondary to amlodipine.  Amlodipine was decreased to 2.5 mg.  Swelling has improved.  Patient has been attempting to utilize compression stockings as well.  Patient would also like to consider going up on her tirzepatide.    Subjective      Review of Systems:   Review of Systems   Cardiovascular:  Positive for leg swelling.   All other systems reviewed and are negative.      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    Type 2 diabetes mellitus     Urinary incontinence     Urinary tract infection Oct 2023    Took antibiotic    Vaginal infection     Vitamin D deficiency        Past Surgical History:   Past Surgical History:   Procedure Laterality Date     SECTION      COLON SURGERY      Colonoscopy    COLONOSCOPY      SCHEDULED FOR     D & C AND LAPAROSCOPY      INTERSTIM PLACEMENT N/A 2024    Procedure: AXONICS STAGES 1 AND 2 LEAD AND GENERATOR PLACEMENT;  Surgeon: Christine Ryder MD;  Location: [facility];  Service: Urology;  Laterality: N/A;    OOPHORECTOMY      Removal of  both ovaries & both fallopian tubes    OVARY SURGERY      Bilateral Salpino Oophorectomy and bilateral Salpingectomy    PARATHYROIDECTOMY N/A 07/16/2021    Procedure: PARATHYROIDECTOMY;  Surgeon: Apolinar Suh MD;  Location: [facility];  Service: General;  Laterality: N/A;    SHOULDER SURGERY Right     Rotator cuff    SINUS SURGERY      TOE SURGERY      TUBAL ABDOMINAL LIGATION Bilateral        Family History:   Family History   Problem Relation Age of Onset    Diabetes Mother     Hypertension Mother     Cancer Mother         Pancreatic and breast cancer    Diabetes Father     Hypertension Father         HBP, diabetes type 2, Parkinson    Hypertension Sister     Thyroid disease Sister     Hypertension Brother        Social History:   Social History     Socioeconomic History    Marital status:    Tobacco Use    Smoking status: Never     Passive exposure: Yes    Smokeless tobacco: Never   Vaping Use    Vaping status: Never Used   Substance and Sexual Activity    Alcohol use: Not Currently     Comment: rarely    Drug use: Never    Sexual activity: Yes     Partners: Male     Birth control/protection: Post-menopausal, Tubal ligation, Bilateral salpingectomy      Comment: Just my        Medications:     Current Outpatient Medications:     acetaminophen (TYLENOL) 650 MG 8 hr tablet, Take 2 tablets by mouth Every 8 (Eight) Hours As Needed for Mild Pain., Disp: , Rfl:     Alpha-D-Galactosidase (Beano) tablet, Take 2 doses by mouth 3 (Three) Times a Day As Needed (FLATULENCE)., Disp: , Rfl:     bisoprolol (ZEBeta) 10 MG tablet, Take 1 tablet by mouth Daily., Disp: 90 tablet, Rfl: 1    clobetasol (TEMOVATE) 0.05 % ointment, Apply 1 Application topically to the appropriate area as directed 3 (Three) Times a Day As Needed. As needed, Disp: , Rfl:     estradiol (ESTRACE) 0.1 MG/GM vaginal cream, Insert 1 g into the vagina 3 (Three) Times a Week if Needed (VAGINAL ATROPHY)., Disp: , Rfl:     fenofibrate  micronized (LOFIBRA) 134 MG capsule, Take 1 capsule by mouth Daily. (Patient taking differently: Take 1 capsule by mouth Continuous As Needed.), Disp: 90 capsule, Rfl: 1    fexofenadine (Allegra Allergy) 180 MG tablet, Allegra Allergy 180 MG Oral Tablet QTY: 0 tablet Days: 0 Refills: 0  Written: 08/06/24 Patient Instructions:, Disp: , Rfl:     fluticasone (FLONASE) 50 MCG/ACT nasal spray, Administer 2 sprays into the nostril(s) as directed by provider Daily As Needed for Rhinitis or Allergies., Disp: , Rfl:     glucose blood (OneTouch Verio) test strip, USE TO CHECK BLOOD SUGAR EVERY DAY, Disp: 100 each, Rfl: 1    hydroCHLOROthiazide 25 MG tablet, Take 0.5 tablets by mouth Every Morning. For blood pressure and fluid, Disp: , Rfl:     losartan (COZAAR) 100 MG tablet, Take 1 tablet by mouth Daily., Disp: 90 tablet, Rfl: 1    metFORMIN (GLUCOPHAGE) 1000 MG tablet, TAKE 1/2 TABLET TWICE A DAY, Disp: 90 tablet, Rfl: 3    methocarbamol (ROBAXIN) 750 MG tablet, Take 1 tablet by mouth 3 (Three) Times a Day. (Patient taking differently: Take 1 tablet by mouth 3 (Three) Times a Day. PRN ONLY), Disp: 90 tablet, Rfl: 1    olopatadine (PATADAY) 0.2 % solution ophthalmic solution, Administer 1 drop to both eyes Daily., Disp: , Rfl:     OneTouch Delica Lancets 30G misc, Use 1 each Daily. Dx e11.65, Disp: 100 each, Rfl: 3    pancrelipase, Lip-Prot-Amyl, (CREON) 17869-08926 units capsule delayed-release particles capsule, Take 3 capsules by mouth 3 (Three) Times a Day With Meals., Disp: , Rfl:     sertraline (ZOLOFT) 100 MG tablet, Take 1 tablet by mouth Daily., Disp: 90 tablet, Rfl: 1    simvastatin (ZOCOR) 40 MG tablet, Take 1 tablet by mouth Every Night., Disp: 90 tablet, Rfl: 1    traMADol (Ultram) 50 MG tablet, Take 1 tablet by mouth Every 8 (Eight) Hours As Needed for Severe Pain. (Patient taking differently: Take 1 tablet by mouth As Needed for Severe Pain.), Disp: 10 tablet, Rfl: 0    vitamin B-12 (CYANOCOBALAMIN) 1000 MCG  "tablet, Take 1 tablet by mouth Daily., Disp: 90 tablet, Rfl: 3    amLODIPine (NORVASC) 2.5 MG tablet, Take 1 tablet by mouth Daily., Disp: 90 tablet, Rfl: 3    Tirzepatide 12.5 MG/0.5ML solution auto-injector, Inject 0.5 mL under the skin into the appropriate area as directed 1 (One) Time Per Week., Disp: 2 mL, Rfl: 6    Allergies:   Allergies   Allergen Reactions    Diflucan [Fluconazole] Headache    Azithromycin GI Intolerance    Codeine Itching    Hydrocodone-Acetaminophen Hives    Latex Itching    Penicillins Hives    Quinapril Other (See Comments)     cough       Objective     Physical Exam:  Vital Signs:   Vitals:    25 0904   BP: 122/70   BP Location: Left arm   Patient Position: Sitting   Cuff Size: Large Adult   Pulse: 68   Resp: 18   SpO2: 99%   Weight: 75.5 kg (166 lb 8 oz)   Height: 157.5 cm (62.01\")   PainSc: 0-No pain     Body mass index is 30.45 kg/m².   Facility age limit for growth %evaristo is 20 years.    Physical Exam  Vitals and nursing note reviewed.   Constitutional:       General: She is not in acute distress.     Appearance: Normal appearance. She is not ill-appearing.   Cardiovascular:      Rate and Rhythm: Normal rate and regular rhythm.   Pulmonary:      Effort: Pulmonary effort is normal.      Breath sounds: Normal breath sounds.   Musculoskeletal:      Right lower le+ Pitting Edema present.      Left lower le+ Pitting Edema present.   Neurological:      Mental Status: She is alert.         Procedures    PHQ-9 Total Score:      Assessment / Plan      Assessment/Plan:   Assessment & Plan  Uncontrolled type 2 diabetes mellitus with hyperglycemia  Will increase tirzepatide to 12.5 mg weekly.  Will check routine labs today.    Orders:    Tirzepatide 12.5 MG/0.5ML solution auto-injector; Inject 0.5 mL under the skin into the appropriate area as directed 1 (One) Time Per Week.    Hemoglobin A1c; Future    CBC Auto Differential; Future    Comprehensive Metabolic Panel; Future    " Albumin/Creatinine Ratio Urine    Comprehensive Metabolic Panel    CBC Auto Differential    Hemoglobin A1c    Swelling of both lower extremities  Improved with decreasing amlodipine.  Patient still has some mild edema noted bilaterally.  She has decreased her hydrochlorothiazide to half a tablet daily.  She is currently taking 2.5 mg amlodipine.  Will have patient stop the amlodipine for 1 week and check BP twice daily.  If BP stays well-controlled, we will plan to discontinue amlodipine.  Orders:    hydroCHLOROthiazide 25 MG tablet; Take 0.5 tablets by mouth Every Morning. For blood pressure and fluid    Essential hypertension  As above.  Will trial without amlodipine.  Patient is to check her BP twice daily.  She can discontinue amlodipine if BP stays below 140/80.    Orders:    hydroCHLOROthiazide 25 MG tablet; Take 0.5 tablets by mouth Every Morning. For blood pressure and fluid    CBC Auto Differential; Future    Comprehensive Metabolic Panel; Future    Comprehensive Metabolic Panel    CBC Auto Differential    Hyperlipidemia, mixed       Orders:    Lipid Panel; Future    Lipid Panel                  Follow Up:   Return in about 3 months (around 6/6/2025) for Recheck.      MARIANNE Mcwilliams MD  [facility]

## 2025-03-06 NOTE — ASSESSMENT & PLAN NOTE
As above.  Will trial without amlodipine.  Patient is to check her BP twice daily.  She can discontinue amlodipine if BP stays below 140/80.    Orders:    hydroCHLOROthiazide 25 MG tablet; Take 0.5 tablets by mouth Every Morning. For blood pressure and fluid    CBC Auto Differential; Future    Comprehensive Metabolic Panel; Future    Comprehensive Metabolic Panel    CBC Auto Differential

## 2025-03-06 NOTE — ASSESSMENT & PLAN NOTE
Will increase tirzepatide to 12.5 mg weekly.  Will check routine labs today.    Orders:    Tirzepatide 12.5 MG/0.5ML solution auto-injector; Inject 0.5 mL under the skin into the appropriate area as directed 1 (One) Time Per Week.    Hemoglobin A1c; Future    CBC Auto Differential; Future    Comprehensive Metabolic Panel; Future    Albumin/Creatinine Ratio Urine    Comprehensive Metabolic Panel    CBC Auto Differential    Hemoglobin A1c

## 2025-03-06 NOTE — ASSESSMENT & PLAN NOTE
Improved with decreasing amlodipine.  Patient still has some mild edema noted bilaterally.  She has decreased her hydrochlorothiazide to half a tablet daily.  She is currently taking 2.5 mg amlodipine.  Will have patient stop the amlodipine for 1 week and check BP twice daily.  If BP stays well-controlled, we will plan to discontinue amlodipine.  Orders:    hydroCHLOROthiazide 25 MG tablet; Take 0.5 tablets by mouth Every Morning. For blood pressure and fluid

## 2025-03-07 ENCOUNTER — TELEPHONE (OUTPATIENT)
Dept: FAMILY MEDICINE CLINIC | Facility: CLINIC | Age: 70
End: 2025-03-07
Payer: COMMERCIAL

## 2025-03-07 LAB
ALBUMIN SERPL-MCNC: 4.6 G/DL (ref 3.9–4.9)
ALP SERPL-CCNC: 51 IU/L (ref 44–121)
ALT SERPL-CCNC: 23 IU/L (ref 0–32)
AST SERPL-CCNC: 26 IU/L (ref 0–40)
BASOPHILS # BLD AUTO: 0.1 X10E3/UL (ref 0–0.2)
BASOPHILS NFR BLD AUTO: 1 %
BILIRUB SERPL-MCNC: 0.4 MG/DL (ref 0–1.2)
BUN SERPL-MCNC: 19 MG/DL (ref 8–27)
BUN/CREAT SERPL: 23 (ref 12–28)
CALCIUM SERPL-MCNC: 10.4 MG/DL (ref 8.7–10.3)
CHLORIDE SERPL-SCNC: 102 MMOL/L (ref 96–106)
CHOLEST SERPL-MCNC: 145 MG/DL (ref 100–199)
CO2 SERPL-SCNC: 23 MMOL/L (ref 20–29)
CREAT SERPL-MCNC: 0.84 MG/DL (ref 0.57–1)
EGFRCR SERPLBLD CKD-EPI 2021: 76 ML/MIN/1.73
EOSINOPHIL # BLD AUTO: 0.3 X10E3/UL (ref 0–0.4)
EOSINOPHIL NFR BLD AUTO: 3 %
ERYTHROCYTE [DISTWIDTH] IN BLOOD BY AUTOMATED COUNT: 14.3 % (ref 11.7–15.4)
GLOBULIN SER CALC-MCNC: 2.4 G/DL (ref 1.5–4.5)
GLUCOSE SERPL-MCNC: 121 MG/DL (ref 70–99)
HBA1C MFR BLD: 7.1 % (ref 4.8–5.6)
HCT VFR BLD AUTO: 41.4 % (ref 34–46.6)
HDLC SERPL-MCNC: 56 MG/DL
HGB BLD-MCNC: 13 G/DL (ref 11.1–15.9)
IMM GRANULOCYTES # BLD AUTO: 0 X10E3/UL (ref 0–0.1)
IMM GRANULOCYTES NFR BLD AUTO: 0 %
LDLC SERPL CALC-MCNC: 73 MG/DL (ref 0–99)
LYMPHOCYTES # BLD AUTO: 2.8 X10E3/UL (ref 0.7–3.1)
LYMPHOCYTES NFR BLD AUTO: 29 %
MCH RBC QN AUTO: 26.9 PG (ref 26.6–33)
MCHC RBC AUTO-ENTMCNC: 31.4 G/DL (ref 31.5–35.7)
MCV RBC AUTO: 86 FL (ref 79–97)
MONOCYTES # BLD AUTO: 0.8 X10E3/UL (ref 0.1–0.9)
MONOCYTES NFR BLD AUTO: 8 %
NEUTROPHILS # BLD AUTO: 5.6 X10E3/UL (ref 1.4–7)
NEUTROPHILS NFR BLD AUTO: 59 %
PLATELET # BLD AUTO: 420 X10E3/UL (ref 150–450)
POTASSIUM SERPL-SCNC: 4.3 MMOL/L (ref 3.5–5.2)
PROT SERPL-MCNC: 7 G/DL (ref 6–8.5)
RBC # BLD AUTO: 4.84 X10E6/UL (ref 3.77–5.28)
SODIUM SERPL-SCNC: 140 MMOL/L (ref 134–144)
TRIGL SERPL-MCNC: 82 MG/DL (ref 0–149)
VLDLC SERPL CALC-MCNC: 16 MG/DL (ref 5–40)
WBC # BLD AUTO: 9.5 X10E3/UL (ref 3.4–10.8)

## 2025-03-07 NOTE — TELEPHONE ENCOUNTER
*HUB CAN RELAY*      Your calcium is borderline elevated.  I recommend you discuss this with the endocrinologist at your next appointment.  Your hemoglobin A1c is slightly elevated at 7.1.  This is improved from your previous check.  Still not at goal.  Hopefully this should improve with the increase dose of your Mounjaro.

## 2025-03-13 ENCOUNTER — CLINICAL SUPPORT (OUTPATIENT)
Dept: FAMILY MEDICINE CLINIC | Facility: CLINIC | Age: 70
End: 2025-03-13
Payer: COMMERCIAL

## 2025-03-13 DIAGNOSIS — I10 ESSENTIAL HYPERTENSION: Primary | ICD-10-CM

## 2025-03-19 ENCOUNTER — CLINICAL SUPPORT (OUTPATIENT)
Dept: FAMILY MEDICINE CLINIC | Facility: CLINIC | Age: 70
End: 2025-03-19
Payer: COMMERCIAL

## 2025-03-19 VITALS — SYSTOLIC BLOOD PRESSURE: 112 MMHG | DIASTOLIC BLOOD PRESSURE: 72 MMHG

## 2025-03-19 DIAGNOSIS — I10 PRIMARY HYPERTENSION: Primary | ICD-10-CM

## 2025-03-22 DIAGNOSIS — I10 ESSENTIAL HYPERTENSION: ICD-10-CM

## 2025-03-22 DIAGNOSIS — M79.89 SWELLING OF BOTH LOWER EXTREMITIES: ICD-10-CM

## 2025-03-22 RX ORDER — HYDROCHLOROTHIAZIDE 25 MG/1
12.5 TABLET ORAL
Start: 2025-03-22

## 2025-03-22 NOTE — TELEPHONE ENCOUNTER
PT PHARMACY REQUESTING RX  Requested Prescriptions:   Requested Prescriptions     Pending Prescriptions Disp Refills    hydroCHLOROthiazide 25 MG tablet       Sig: Take 0.5 tablets by mouth Every Morning. For blood pressure and fluid        Pharmacy where request should be sent: [id number] - [place], [place] - [address] AT [facility] [address] - [phone]  - [phone] FX     Last office visit with prescribing clinician: 3/6/2025   Last telemedicine visit with prescribing clinician: Visit date not found   Next office visit with prescribing clinician: Visit date not found         Bree Grant, PCT   03/22/25 09:09 EDT

## 2025-03-25 ENCOUNTER — TELEPHONE (OUTPATIENT)
Age: 70
End: 2025-03-25

## 2025-03-25 DIAGNOSIS — E11.65 UNCONTROLLED TYPE 2 DIABETES MELLITUS WITH HYPERGLYCEMIA: Primary | ICD-10-CM

## 2025-03-25 NOTE — TELEPHONE ENCOUNTER
Caller: Izabel Abreu    Relationship: Self    Best call back number:   Telephone Information:   Mobile [phone]     What is the best time to reach you: ANYTIME    Who are you requesting to speak with (clinical staff, provider,  specific staff member): CLINICAL STAFF / PROVIDER     What was the call regarding: PT CALLED STATING SHE INCREASED HER MOUNJARO MEDICATION TO 12.5 ON SUNDAY AND IS EXPERIENCING VOMITING AND DIARRHEA AND IS NEEDING TO SPEAK WITH CLINICAL STAFF. PLEASE ADVISE.

## 2025-03-27 ENCOUNTER — CLINICAL SUPPORT (OUTPATIENT)
Dept: FAMILY MEDICINE CLINIC | Facility: CLINIC | Age: 70
End: 2025-03-27
Payer: COMMERCIAL

## 2025-03-27 VITALS — SYSTOLIC BLOOD PRESSURE: 120 MMHG | DIASTOLIC BLOOD PRESSURE: 80 MMHG

## 2025-04-01 DIAGNOSIS — I10 ESSENTIAL HYPERTENSION: ICD-10-CM

## 2025-04-01 DIAGNOSIS — M79.89 SWELLING OF BOTH LOWER EXTREMITIES: ICD-10-CM

## 2025-04-01 NOTE — TELEPHONE ENCOUNTER
Requested Prescriptions:   Requested Prescriptions     Pending Prescriptions Disp Refills    hydroCHLOROthiazide 25 MG tablet       Sig: Take 0.5 tablets by mouth Every Morning. For blood pressure and fluid        Pharmacy where request should be sent: [facility] #[id number] - [place], [place] - [address] AT [facility] [address] - [phone]  - [phone]      Last office visit with prescribing clinician: 3/6/2025   Last telemedicine visit with prescribing clinician: Visit date not found   Next office visit with prescribing clinician: Visit date not found     Additional details provided by patient: REQUEST THROUGH [facility] [employer]

## 2025-04-03 NOTE — TELEPHONE ENCOUNTER
Caller: Izabel Abreu    Relationship: Self    Best call back number: [phone]     Requested Prescriptions:   Requested Prescriptions     Pending Prescriptions Disp Refills    hydroCHLOROthiazide 25 MG tablet       Sig: Take 0.5 tablets by mouth Every Morning. For blood pressure and fluid        Pharmacy where request should be sent: [facility] #[id number] - [place], [place] - [address]  AT [facility] [address] - [phone]  - [phone] FX     Last office visit with prescribing clinician: 3/6/2025   Last telemedicine visit with prescribing clinician: Visit date not found   Next office visit with prescribing clinician: Visit date not found     Additional details provided by patient: OUT OF MEDICATION      Does the patient have less than a 3 day supply:  [x] Yes  [] No    Would you like a call back once the refill request has been completed: [] Yes [x] No    If the office needs to give you a call back, can they leave a voicemail: [] Yes [x] No    Claudine Archer   04/03/25 11:56 EDT

## 2025-04-04 DIAGNOSIS — E78.2 HYPERLIPIDEMIA, MIXED: ICD-10-CM

## 2025-04-04 RX ORDER — FENOFIBRATE 134 MG/1
134 CAPSULE ORAL DAILY
Qty: 90 CAPSULE | Refills: 1 | Status: SHIPPED | OUTPATIENT
Start: 2025-04-04

## 2025-04-04 RX ORDER — SIMVASTATIN 40 MG
40 TABLET ORAL NIGHTLY
Qty: 90 TABLET | Refills: 1 | Status: SHIPPED | OUTPATIENT
Start: 2025-04-04

## 2025-04-07 RX ORDER — HYDROCHLOROTHIAZIDE 25 MG/1
12.5 TABLET ORAL
Start: 2025-04-07

## 2025-04-15 DIAGNOSIS — M79.89 SWELLING OF BOTH LOWER EXTREMITIES: ICD-10-CM

## 2025-04-15 DIAGNOSIS — I10 ESSENTIAL HYPERTENSION: ICD-10-CM

## 2025-04-15 RX ORDER — HYDROCHLOROTHIAZIDE 25 MG/1
12.5 TABLET ORAL
Start: 2025-04-15 | End: 2025-04-21 | Stop reason: SDUPTHER

## 2025-04-15 NOTE — TELEPHONE ENCOUNTER
Caller: Izabel Abreu    Relationship: Self    Best call back number: [phone]     Requested Prescriptions:   Requested Prescriptions     Pending Prescriptions Disp Refills    hydroCHLOROthiazide 25 MG tablet       Sig: Take 0.5 tablets by mouth Every Morning. For blood pressure and fluid        Pharmacy where request should be sent: [facility] [id number] [place], [place] - [address] [facility] AT [facility] [address] - [phone]  - [phone] FX     Last office visit with prescribing clinician: 3/6/2025   Last telemedicine visit with prescribing clinician: Visit date not found   Next office visit with prescribing clinician: Visit date not found     Additional details provided by patient: INSURANCE IS WANTING 90 DAY SUPPLY.    Does the patient have less than a 3 day supply:  [] Yes  [x] No    Would you like a call back once the refill request has been completed: [] Yes [x] No    If the office needs to give you a call back, can they leave a voicemail: [] Yes [x] No    Claudine Farmer Rep   04/15/25 10:32 EDT

## 2025-04-16 ENCOUNTER — OFFICE VISIT (OUTPATIENT)
Age: 70
End: 2025-04-16
Payer: COMMERCIAL

## 2025-04-16 VITALS
BODY MASS INDEX: 30.18 KG/M2 | WEIGHT: 164 LBS | HEART RATE: 72 BPM | OXYGEN SATURATION: 98 % | DIASTOLIC BLOOD PRESSURE: 72 MMHG | HEIGHT: 62 IN | SYSTOLIC BLOOD PRESSURE: 122 MMHG

## 2025-04-16 DIAGNOSIS — E11.65 UNCONTROLLED TYPE 2 DIABETES MELLITUS WITH HYPERGLYCEMIA: Primary | ICD-10-CM

## 2025-04-16 LAB
EXPIRATION DATE: ABNORMAL
GLUCOSE BLDC GLUCOMTR-MCNC: 141 MG/DL (ref 70–130)
Lab: ABNORMAL

## 2025-04-16 PROCEDURE — 99213 OFFICE O/P EST LOW 20 MIN: CPT | Performed by: INTERNAL MEDICINE

## 2025-04-16 PROCEDURE — 82947 ASSAY GLUCOSE BLOOD QUANT: CPT | Performed by: INTERNAL MEDICINE

## 2025-04-16 NOTE — ASSESSMENT & PLAN NOTE
Eyes- up to date  Kidneys and cholesterol done in march  Foot check is due - done today   ---  Improved 7.6 yo 7.1  Plan : no changes

## 2025-04-16 NOTE — PROGRESS NOTES
"     Office Note      Date: 2025  Patient Name: Izabel Abreu  MRN: [id number]  : 1955    Chief Complaint   Patient presents with    Diabetes       History of Present Illness:   Izabel Abreu is a 69 y.o. female who presents for Diabetes type 2.   Current RX mounjaro 10 mg / metformin     Had  tried higher does mounjaro but did not tolerate that.  So is back on 10 mg per week     Bg checks are done: daily   Hypoglycemia : none       Last A1c:  Hemoglobin A1C   Date Value Ref Range Status   2025 7.1 (H) 4.8 - 5.6 % Final     Comment:              Prediabetes: 5.7 - 6.4           Diabetes: >6.4           Glycemic control for adults with diabetes: <7.0     2024 7.7 (A) 4.5 - 5.7 % Final       Changes in health since last visit:  had knee surgery .    Subjective         Review of Systems:   Review of Systems   Endocrine: Negative for polydipsia and polyuria.       The following portions of the patient's history were reviewed and updated as appropriate: allergies, current medications, past family history, past medical history, past social history, past surgical history, and problem list.    Objective     Visit Vitals  /72 (BP Location: Left arm, Patient Position: Sitting, Cuff Size: Adult)   Pulse 72   Ht 157.5 cm (62\")   Wt 74.4 kg (164 lb)   SpO2 98%   BMI 30.00 kg/m²           Physical Exam:  Physical Exam  Vitals reviewed.   Constitutional:       Appearance: Normal appearance.   Neurological:      Mental Status: She is alert.   Psychiatric:         Mood and Affect: Mood normal.         Behavior: Behavior normal.         Thought Content: Thought content normal.         Judgment: Judgment normal.          Assessment / Plan      Assessment & Plan:  Problem List Items Addressed This Visit       Uncontrolled type 2 diabetes mellitus with hyperglycemia - Primary    Overview   Intolerant of saad  Intolerant on sglt2          Current Assessment & Plan   Eyes- up to date  Kidneys and " cholesterol done in march  Foot check is due - done today   ---  Improved 7.8 yo 7.1  Plan : no changes          Relevant Medications    metFORMIN (GLUCOPHAGE) 1000 MG tablet    Tirzepatide 10 MG/0.5ML solution auto-injector    Other Relevant Orders    POC Glucose, Blood (Completed)         Electronically signed by : Dez Sánchez MD  04/16/2025

## 2025-04-18 DIAGNOSIS — M79.89 SWELLING OF BOTH LOWER EXTREMITIES: ICD-10-CM

## 2025-04-18 DIAGNOSIS — I10 ESSENTIAL HYPERTENSION: ICD-10-CM

## 2025-04-18 RX ORDER — HYDROCHLOROTHIAZIDE 25 MG/1
12.5 TABLET ORAL
Start: 2025-04-18

## 2025-04-18 NOTE — TELEPHONE ENCOUNTER
Caller: JOSE    Relationship: Other    Best call back number: [phone]     Requested Prescriptions:   Requested Prescriptions     Pending Prescriptions Disp Refills    hydroCHLOROthiazide 25 MG tablet       Sig: Take 0.5 tablets by mouth Every Morning. For blood pressure and fluid        Pharmacy where request should be sent: [id number] [place] - [address] [facility] AT [facility] [address] - [phone] [facility] [phone] FX     Last office visit with prescribing clinician: 3/6/2025   Last telemedicine visit with prescribing clinician: Visit date not found   Next office visit with prescribing clinician: Visit date not found     Additional details provided by patient: PATIENT NEEDS THIS PRESCRIPTION CALLED IN TO A DIFFERENT PHARMACY    PLEASE CANCEL CURRENT ORDER AND RE-SEND      Does the patient have less than a 3 day supply:  [] Yes  [x] No    Would you like a call back once the refill request has been completed: [] Yes [x] No    If the office needs to give you a call back, can they leave a voicemail: [] Yes [x] No    Claudine Cox Rep   04/18/25 10:12 EDT

## 2025-04-21 DIAGNOSIS — I10 ESSENTIAL HYPERTENSION: ICD-10-CM

## 2025-04-21 DIAGNOSIS — M79.89 SWELLING OF BOTH LOWER EXTREMITIES: ICD-10-CM

## 2025-04-21 RX ORDER — HYDROCHLOROTHIAZIDE 25 MG/1
25 TABLET ORAL
Start: 2025-04-21 | End: 2025-04-22 | Stop reason: SDUPTHER

## 2025-04-22 ENCOUNTER — TELEPHONE (OUTPATIENT)
Dept: FAMILY MEDICINE CLINIC | Facility: CLINIC | Age: 70
End: 2025-04-22
Payer: COMMERCIAL

## 2025-04-22 DIAGNOSIS — I10 ESSENTIAL HYPERTENSION: ICD-10-CM

## 2025-04-22 DIAGNOSIS — M79.89 SWELLING OF BOTH LOWER EXTREMITIES: ICD-10-CM

## 2025-04-22 RX ORDER — HYDROCHLOROTHIAZIDE 25 MG/1
25 TABLET ORAL
Qty: 90 TABLET | Refills: 2 | Status: SHIPPED | OUTPATIENT
Start: 2025-04-22

## 2025-04-22 NOTE — TELEPHONE ENCOUNTER
Caller: Izabel Abreu    Relationship: Self    Best call back number: [phone]     Which medication are you concerned about: hydroCHLOROthiazide 25 MG tablet    Who prescribed you this medication: DR GRIFFIN     When did you start taking this medication: 02/20/2025    What are your concerns: REPORTS THIS WAS SUPPOSED TO HAVE BEEN RESENT TO THE PHARMACY YESTERDAY (E-PRESCRIBING STATUS SHOWS DID NOT TRANSMIT ON 04/21/2025)    THE PATIENT HAS DECIDED THAT SHE WANTS THIS SENT TO:   [facility] #[id number] - [place], [place] - [address] AT [facility] [address] - [phone]  - [phone]  [phone]       How long have you had these concerns: PLEASE CALL AND ADVISE WHEN SENT TO THE PHARMACY

## 2025-04-28 RX ORDER — LANOLIN ALCOHOL/MO/W.PET/CERES
1000 CREAM (GRAM) TOPICAL DAILY
Qty: 90 TABLET | Refills: 3 | OUTPATIENT
Start: 2025-04-28

## 2025-05-05 DIAGNOSIS — I10 ESSENTIAL HYPERTENSION: ICD-10-CM

## 2025-05-05 RX ORDER — LOSARTAN POTASSIUM 100 MG/1
100 TABLET ORAL DAILY
Qty: 90 TABLET | Refills: 1 | Status: SHIPPED | OUTPATIENT
Start: 2025-05-05

## 2025-05-05 RX ORDER — BISOPROLOL FUMARATE 10 MG/1
10 TABLET, FILM COATED ORAL DAILY
Qty: 90 TABLET | Refills: 1 | Status: SHIPPED | OUTPATIENT
Start: 2025-05-05

## 2025-05-05 NOTE — TELEPHONE ENCOUNTER
Caller: Izabel Abreu    Relationship: Self    Best call back number: [phone]     Requested Prescriptions:   Requested Prescriptions     Pending Prescriptions Disp Refills    bisoprolol (ZEBeta) 10 MG tablet 90 tablet 1     Sig: Take 1 tablet by mouth Daily.    losartan (COZAAR) 100 MG tablet 90 tablet 1     Sig: Take 1 tablet by mouth Daily.        Pharmacy where request should be sent: [id number] - [place], [place] - [address] [place] [address] AT [facility] [address] - [phone] [facility] [phone]      Last office visit with prescribing clinician: 3/6/2025   Last telemedicine visit with prescribing clinician: Visit date not found   Next office visit with prescribing clinician: Visit date not found     Additional details provided by patient:     Does the patient have less than a 3 day supply:  [] Yes  [x] No    Would you like a call back once the refill request has been completed: [] Yes [x] No    If the office needs to give you a call back, can they leave a voicemail: [] Yes [x] No    Vy Ta   05/05/25 10:08 EDT

## 2025-05-06 ENCOUNTER — TELEPHONE (OUTPATIENT)
Dept: FAMILY MEDICINE CLINIC | Facility: CLINIC | Age: 70
End: 2025-05-06
Payer: COMMERCIAL

## 2025-05-06 RX ORDER — PROMETHAZINE HYDROCHLORIDE 25 MG/1
25 TABLET ORAL EVERY 6 HOURS PRN
Qty: 15 TABLET | Refills: 1 | Status: SHIPPED | OUTPATIENT
Start: 2025-05-06

## 2025-05-06 NOTE — TELEPHONE ENCOUNTER
Caller: Izabel Abreu    Relationship: Self    Best call back number: [phone]     What medication are you requesting: ZAYRAIGRTREVOR    What are your current symptoms: UPSET STOMACH AND DIARRHEA    If a prescription is needed, what is your preferred pharmacy and phone number: [facility] #[id number] - [place], [place] - [address] AT [facility] [address] - [phone]  - [phone] FX

## 2025-06-02 DIAGNOSIS — E11.65 UNCONTROLLED TYPE 2 DIABETES MELLITUS WITH HYPERGLYCEMIA: ICD-10-CM

## 2025-06-02 DIAGNOSIS — F41.1 GAD (GENERALIZED ANXIETY DISORDER): ICD-10-CM

## 2025-06-02 RX ORDER — SERTRALINE HYDROCHLORIDE 100 MG/1
100 TABLET, FILM COATED ORAL DAILY
Qty: 90 TABLET | Refills: 1 | Status: SHIPPED | OUTPATIENT
Start: 2025-06-02

## 2025-06-02 NOTE — TELEPHONE ENCOUNTER
Caller: Brady Izabel Bur    Relationship: Self    Best call back number:   Telephone Information:   Mobile [phone]        Requested Prescriptions:   Requested Prescriptions     Pending Prescriptions Disp Refills    metFORMIN (GLUCOPHAGE) 1000 MG tablet 90 tablet 3     Sig: TAKE 1/2 TABLET TWICE A DAY    sertraline (ZOLOFT) 100 MG tablet 90 tablet 1     Sig: Take 1 tablet by mouth Daily.        Pharmacy where request should be sent: [facility] #[id number] - [place], [place] - [address] [facility] AT [facility] [address] - [phone]  - [phone] FX     Last office visit with prescribing clinician: 3/6/2025   Last telemedicine visit with prescribing clinician: Visit date not found   Next office visit with prescribing clinician: Visit date not found     Additional details provided by patient:     Does the patient have less than a 3 day supply:  [] Yes  [x] No    Would you like a call back once the refill request has been completed: [] Yes [x] No    If the office needs to give you a call back, can they leave a voicemail: [] Yes [x] No    Claudine Plaza   06/02/25 12:05 EDT

## 2025-06-05 RX ORDER — METHOCARBAMOL 750 MG/1
750 TABLET, FILM COATED ORAL 3 TIMES DAILY PRN
Qty: 90 TABLET | Refills: 2 | Status: SHIPPED | OUTPATIENT
Start: 2025-06-05

## 2025-06-30 ENCOUNTER — CLINICAL SUPPORT (OUTPATIENT)
Dept: FAMILY MEDICINE CLINIC | Facility: CLINIC | Age: 70
End: 2025-06-30
Payer: COMMERCIAL

## 2025-06-30 VITALS — SYSTOLIC BLOOD PRESSURE: 122 MMHG | DIASTOLIC BLOOD PRESSURE: 74 MMHG

## 2025-06-30 DIAGNOSIS — I10 ESSENTIAL HYPERTENSION: Primary | ICD-10-CM

## 2025-07-28 RX ORDER — BLOOD SUGAR DIAGNOSTIC
STRIP MISCELLANEOUS
Qty: 100 EACH | Refills: 3 | Status: SHIPPED | OUTPATIENT
Start: 2025-07-28

## 2025-07-28 NOTE — TELEPHONE ENCOUNTER
Rx Refill Note  Requested Prescriptions     Pending Prescriptions Disp Refills    glucose blood (OneTouch Verio) test strip [Pharmacy Med Name: ONE TOUCH VERIO TEST STR (NEW) 50] 100 each 3     Sig: USE TO CHECK BLOOD SUGAR DAILY      Last office visit with prescribing clinician: 4/16/2025     Next office visit with prescribing clinician: 10/22/2025       Gaye Camejo MA  07/28/25, 13:48 EDT

## 2025-07-29 ENCOUNTER — TELEPHONE (OUTPATIENT)
Dept: UROLOGY | Facility: CLINIC | Age: 70
End: 2025-07-29
Payer: COMMERCIAL

## 2025-07-29 NOTE — TELEPHONE ENCOUNTER
PT CALLED AND HAS AN ISSUE AND WOULD LIKE CLINICAL STAFF TO CONTACT HER.     SHE WAS GOING IN FOR A MAMMO WHEN SHE WAS TALKING TO ME SO SHE ASKED TO WAIT ABOUT AN HOUR

## 2025-07-29 NOTE — TELEPHONE ENCOUNTER
I called patient back and she stated her axonics isn't working at all now and she is having frequency and urgency and leakage. She went to her GYN today and they ran a urine sample to make sure she didn't have a UTI and will find out the results from that in a couple days. I asked if she has talked to [facility] and she said she talked to [facility] last week and they did adjustments but it has gotten worst, I let her know I would recommend to reach out to [facility] to see if he can do anything to help her symptoms and see what the urine culture said. I also let her know I would send this over to .

## 2025-08-05 ENCOUNTER — CLINICAL SUPPORT (OUTPATIENT)
Dept: UROLOGY | Facility: CLINIC | Age: 70
End: 2025-08-05
Payer: COMMERCIAL

## (undated) DEVICE — DISPOSABLE BIPOLAR FORCEPS 4" (10.2CM) JEWELERS, STRAIGHT 0.4MM TIP AND 12 FT. (3.6M) CABLE: Brand: KIRWAN

## (undated) DEVICE — SUT SILK 3/0 TIES 18IN A184H

## (undated) DEVICE — SUT SILK 2/0 TIES 18IN A185H

## (undated) DEVICE — ANTIBACTERIAL UNDYED BRAIDED (POLYGLACTIN 910), SYNTHETIC ABSORBABLE SUTURE: Brand: COATED VICRYL

## (undated) DEVICE — Device: Brand: JELCO

## (undated) DEVICE — PK MINOR SPLT 10

## (undated) DEVICE — APPL CHLORAPREP 26ML CLR

## (undated) DEVICE — GLV SURG BIOGEL LTX PF 7

## (undated) DEVICE — HDRST INTUB GENTLETOUCH SLOT 7IN RT

## (undated) DEVICE — TRAP FLD MINIVAC MEGADYNE 100ML

## (undated) DEVICE — SUT MNCRYL PLS ANTIB UD 4/0 PS2 18IN

## (undated) DEVICE — ADHS SKIN PREMIERPRO EXOFIN TOPICAL HI/VISC .5ML

## (undated) DEVICE — DISH PETRI 3.5IN MD STRL LF

## (undated) DEVICE — REMOTE CONTROL: Brand: AXONICS

## (undated) DEVICE — TAPE,CLOTH/SILK,CURAD,3"X10YD,LF,40/CS: Brand: CURAD

## (undated) DEVICE — LEAD IMPLANT KIT: Brand: AXONICS

## (undated) DEVICE — PENCL ROCKRSWCH MEGADYNE W/HOLSTR 10FT SS

## (undated) DEVICE — CHARGING SYSTEM: Brand: AXONICS

## (undated) DEVICE — ST BLD COL SAFETYLOK LL 23GA 3/4IN 12IN

## (undated) DEVICE — APPL CHLORAPREP TINTED 26ML TEAL

## (undated) DEVICE — ELECTRD BLD EZ CLN MOD XLNG 2.75IN

## (undated) DEVICE — PATIENT RETURN ELECTRODE, SINGLE-USE, CONTACT QUALITY MONITORING, ADULT, WITH 9FT CORD, FOR PATIENTS WEIGING OVER 33LBS. (15KG): Brand: MEGADYNE

## (undated) DEVICE — INTENDED FOR TISSUE SEPARATION, AND OTHER PROCEDURES THAT REQUIRE A SHARP SURGICAL BLADE TO PUNCTURE OR CUT.: Brand: BARD-PARKER ® STAINLESS STEEL BLADES

## (undated) DEVICE — CVR HNDL LIGHT RIGID

## (undated) DEVICE — PROBE 8225825 3PK INCREMT STD PRASS ROHS

## (undated) DEVICE — DRSNG WND BORDR/ADHS NONADHR/GZ LF 4X4IN STRL

## (undated) DEVICE — SYRINGE,CONTROL,LL,FINGER,GRIP: Brand: MEDLINE INDUSTRIES, INC.

## (undated) DEVICE — Device

## (undated) DEVICE — DRAPE,REIN 53X77,STERILE: Brand: MEDLINE

## (undated) DEVICE — TOWEL,OR,DSP,ST,BLUE,STD,4/PK,20PK/CS: Brand: MEDLINE

## (undated) DEVICE — INTENDED USE FOR SURGICAL MARKING ON INTACT SKIN, ALSO PROVIDES A PERMANENT METHOD OF IDENTIFYING OBJECTS IN THE OPERATING ROOM: Brand: WRITESITE® REGULAR TIP SKIN MARKER

## (undated) DEVICE — DRSNG SURESITE WNDW 4X4.5

## (undated) DEVICE — DRAPE,TOWEL,LARGE,INVISISHIELD: Brand: MEDLINE

## (undated) DEVICE — TBG PENCL TELESCP MEGADYNE SMOKE EVAC 10FT

## (undated) DEVICE — UNDERGLV SURG BIOGEL INDICATOR LF PF 7.5

## (undated) DEVICE — C-ARM: Brand: UNBRANDED

## (undated) DEVICE — SUT SILK 4/0 TIES 18IN A183H